# Patient Record
Sex: FEMALE | Race: WHITE
[De-identification: names, ages, dates, MRNs, and addresses within clinical notes are randomized per-mention and may not be internally consistent; named-entity substitution may affect disease eponyms.]

---

## 2021-03-10 PROBLEM — Z00.00 ENCOUNTER FOR PREVENTIVE HEALTH EXAMINATION: Status: ACTIVE | Noted: 2021-03-10

## 2021-03-17 ENCOUNTER — NON-APPOINTMENT (OUTPATIENT)
Age: 71
End: 2021-03-17

## 2021-03-21 ENCOUNTER — APPOINTMENT (OUTPATIENT)
Dept: DISASTER EMERGENCY | Facility: CLINIC | Age: 71
End: 2021-03-21

## 2021-03-21 DIAGNOSIS — Z01.818 ENCOUNTER FOR OTHER PREPROCEDURAL EXAMINATION: ICD-10-CM

## 2021-03-22 LAB — SARS-COV-2 N GENE NPH QL NAA+PROBE: NOT DETECTED

## 2021-03-24 ENCOUNTER — OUTPATIENT (OUTPATIENT)
Dept: OUTPATIENT SERVICES | Facility: HOSPITAL | Age: 71
LOS: 1 days | Discharge: HOME | End: 2021-03-24
Payer: MEDICARE

## 2021-03-24 VITALS
TEMPERATURE: 98 F | OXYGEN SATURATION: 99 % | DIASTOLIC BLOOD PRESSURE: 75 MMHG | HEART RATE: 95 BPM | RESPIRATION RATE: 16 BRPM | HEIGHT: 62 IN | WEIGHT: 130.07 LBS | SYSTOLIC BLOOD PRESSURE: 153 MMHG

## 2021-03-24 VITALS
DIASTOLIC BLOOD PRESSURE: 76 MMHG | TEMPERATURE: 98 F | HEART RATE: 78 BPM | SYSTOLIC BLOOD PRESSURE: 120 MMHG | RESPIRATION RATE: 17 BRPM | OXYGEN SATURATION: 98 %

## 2021-03-24 DIAGNOSIS — Z98.890 OTHER SPECIFIED POSTPROCEDURAL STATES: Chronic | ICD-10-CM

## 2021-03-24 DIAGNOSIS — I25.10 ATHEROSCLEROTIC HEART DISEASE OF NATIVE CORONARY ARTERY WITHOUT ANGINA PECTORIS: ICD-10-CM

## 2021-03-24 LAB
ANION GAP SERPL CALC-SCNC: 14 MMOL/L — SIGNIFICANT CHANGE UP (ref 5–17)
BUN SERPL-MCNC: 19 MG/DL — SIGNIFICANT CHANGE UP (ref 7–23)
CALCIUM SERPL-MCNC: 10 MG/DL — SIGNIFICANT CHANGE UP (ref 8.4–10.5)
CHLORIDE SERPL-SCNC: 104 MMOL/L — SIGNIFICANT CHANGE UP (ref 96–108)
CO2 SERPL-SCNC: 21 MMOL/L — LOW (ref 22–31)
CREAT SERPL-MCNC: 0.53 MG/DL — SIGNIFICANT CHANGE UP (ref 0.5–1.3)
GLUCOSE BLDC GLUCOMTR-MCNC: 213 MG/DL — HIGH (ref 70–99)
GLUCOSE BLDC GLUCOMTR-MCNC: 245 MG/DL — HIGH (ref 70–99)
GLUCOSE SERPL-MCNC: 248 MG/DL — HIGH (ref 70–99)
HCT VFR BLD CALC: 44.6 % — SIGNIFICANT CHANGE UP (ref 34.5–45)
HGB BLD-MCNC: 14.9 G/DL — SIGNIFICANT CHANGE UP (ref 11.5–15.5)
MCHC RBC-ENTMCNC: 29.7 PG — SIGNIFICANT CHANGE UP (ref 27–34)
MCHC RBC-ENTMCNC: 33.4 GM/DL — SIGNIFICANT CHANGE UP (ref 32–36)
MCV RBC AUTO: 88.8 FL — SIGNIFICANT CHANGE UP (ref 80–100)
NRBC # BLD: 0 /100 WBCS — SIGNIFICANT CHANGE UP (ref 0–0)
PLATELET # BLD AUTO: 313 K/UL — SIGNIFICANT CHANGE UP (ref 150–400)
POTASSIUM SERPL-MCNC: 4.5 MMOL/L — SIGNIFICANT CHANGE UP (ref 3.5–5.3)
POTASSIUM SERPL-SCNC: 4.5 MMOL/L — SIGNIFICANT CHANGE UP (ref 3.5–5.3)
RBC # BLD: 5.02 M/UL — SIGNIFICANT CHANGE UP (ref 3.8–5.2)
RBC # FLD: 12.2 % — SIGNIFICANT CHANGE UP (ref 10.3–14.5)
SODIUM SERPL-SCNC: 139 MMOL/L — SIGNIFICANT CHANGE UP (ref 135–145)
WBC # BLD: 8.04 K/UL — SIGNIFICANT CHANGE UP (ref 3.8–10.5)
WBC # FLD AUTO: 8.04 K/UL — SIGNIFICANT CHANGE UP (ref 3.8–10.5)

## 2021-03-24 PROCEDURE — 99153 MOD SED SAME PHYS/QHP EA: CPT

## 2021-03-24 PROCEDURE — 77263 THER RADIOLOGY TX PLNG CPLX: CPT

## 2021-03-24 PROCEDURE — 77316 BRACHYTX ISODOSE PLAN SIMPLE: CPT | Mod: 26,59

## 2021-03-24 PROCEDURE — 77770 HDR RDNCL NTRSTL/ICAV BRCHTX: CPT

## 2021-03-24 PROCEDURE — C1728: CPT

## 2021-03-24 PROCEDURE — 77470 SPECIAL RADIATION TREATMENT: CPT | Mod: 26,59

## 2021-03-24 PROCEDURE — 77316 BRACHYTX ISODOSE PLAN SIMPLE: CPT

## 2021-03-24 PROCEDURE — C1894: CPT

## 2021-03-24 PROCEDURE — 82962 GLUCOSE BLOOD TEST: CPT

## 2021-03-24 PROCEDURE — 77290 THER RAD SIMULAJ FIELD CPLX: CPT

## 2021-03-24 PROCEDURE — C1725: CPT

## 2021-03-24 PROCEDURE — 99152 MOD SED SAME PHYS/QHP 5/>YRS: CPT

## 2021-03-24 PROCEDURE — C1753: CPT

## 2021-03-24 PROCEDURE — 77770 HDR RDNCL NTRSTL/ICAV BRCHTX: CPT | Mod: 26

## 2021-03-24 PROCEDURE — 77470 SPECIAL RADIATION TREATMENT: CPT

## 2021-03-24 PROCEDURE — 93010 ELECTROCARDIOGRAM REPORT: CPT | Mod: 77

## 2021-03-24 PROCEDURE — 80048 BASIC METABOLIC PNL TOTAL CA: CPT

## 2021-03-24 PROCEDURE — 85027 COMPLETE CBC AUTOMATED: CPT

## 2021-03-24 PROCEDURE — 77370 RADIATION PHYSICS CONSULT: CPT

## 2021-03-24 PROCEDURE — 92974 CATH PLACE CARDIO BRACHYTX: CPT

## 2021-03-24 PROCEDURE — 99221 1ST HOSP IP/OBS SF/LOW 40: CPT | Mod: 25

## 2021-03-24 PROCEDURE — C1885: CPT

## 2021-03-24 PROCEDURE — 93005 ELECTROCARDIOGRAM TRACING: CPT

## 2021-03-24 PROCEDURE — 93010 ELECTROCARDIOGRAM REPORT: CPT

## 2021-03-24 PROCEDURE — 77290 THER RAD SIMULAJ FIELD CPLX: CPT | Mod: 26,59

## 2021-03-24 PROCEDURE — C1769: CPT

## 2021-03-24 PROCEDURE — 92978 ENDOLUMINL IVUS OCT C 1ST: CPT | Mod: RC

## 2021-03-24 PROCEDURE — C1717: CPT

## 2021-03-24 PROCEDURE — C1887: CPT

## 2021-03-24 PROCEDURE — 92924 PRQ TRLUML C ATHRC 1 ART&/BR: CPT | Mod: RC

## 2021-03-24 RX ORDER — INSULIN LISPRO 100/ML
VIAL (ML) SUBCUTANEOUS AT BEDTIME
Refills: 0 | Status: DISCONTINUED | OUTPATIENT
Start: 2021-03-24 | End: 2021-04-07

## 2021-03-24 RX ORDER — DEXTROSE 50 % IN WATER 50 %
25 SYRINGE (ML) INTRAVENOUS ONCE
Refills: 0 | Status: DISCONTINUED | OUTPATIENT
Start: 2021-03-24 | End: 2021-04-07

## 2021-03-24 RX ORDER — INSULIN LISPRO 100/ML
VIAL (ML) SUBCUTANEOUS
Refills: 0 | Status: DISCONTINUED | OUTPATIENT
Start: 2021-03-24 | End: 2021-04-07

## 2021-03-24 RX ORDER — SODIUM CHLORIDE 9 MG/ML
1000 INJECTION, SOLUTION INTRAVENOUS
Refills: 0 | Status: DISCONTINUED | OUTPATIENT
Start: 2021-03-24 | End: 2021-04-07

## 2021-03-24 RX ORDER — DEXTROSE 50 % IN WATER 50 %
15 SYRINGE (ML) INTRAVENOUS ONCE
Refills: 0 | Status: DISCONTINUED | OUTPATIENT
Start: 2021-03-24 | End: 2021-04-07

## 2021-03-24 RX ORDER — GLUCAGON INJECTION, SOLUTION 0.5 MG/.1ML
1 INJECTION, SOLUTION SUBCUTANEOUS ONCE
Refills: 0 | Status: DISCONTINUED | OUTPATIENT
Start: 2021-03-24 | End: 2021-04-07

## 2021-03-24 RX ORDER — DIPHENHYDRAMINE HCL 50 MG
50 CAPSULE ORAL ONCE
Refills: 0 | Status: COMPLETED | OUTPATIENT
Start: 2021-03-24 | End: 2021-03-24

## 2021-03-24 RX ORDER — DEXTROSE 50 % IN WATER 50 %
12.5 SYRINGE (ML) INTRAVENOUS ONCE
Refills: 0 | Status: DISCONTINUED | OUTPATIENT
Start: 2021-03-24 | End: 2021-04-07

## 2021-03-24 RX ADMIN — Medication 50 MILLIGRAM(S): at 09:47

## 2021-03-24 NOTE — H&P CARDIOLOGY - HISTORY OF PRESENT ILLNESS
71 yo F with PMhx CAD with stents to Left anterior descending artery, diagonal  and circumflex and s/p cardiac cath at [facility] on 2/22/2021 with PTCA to RCA. She presents today for staged brachytherapy to the RCA .  71 yo F with PMhx HLD, DM ,  CAD with stents x20 to Left anterior descending artery, diagonal  and circumflex and s/p cardiac cath at [facility] on 2/22/2021 with PTCA to RCA. In February she was experiencing CP, KNOX and fatigued so was scheduled for cardiac cath .  She presents today for staged brachytherapy to the RCA . COVID negative 3/21, no fever or chills, no N/V/D or abdominal pain.  No hx of PPM or implantable cardiac device .

## 2021-03-24 NOTE — CHART NOTE - NSCHARTNOTEFT_GEN_A_CORE
Received pt s/p successful IVUS guided PCI of RCA with laser Atherectomy, Brachytherapy via RRA band .  Remove RRA band at 1400hr today on ASA and Plavix . Right radial wrist intact with band on no bleeding or hematoma noted. Will continue to monitor closely . No acute distress noted. No complaints of any CP or SOB noted.     Mary Sheth np   [phone]   Cardiology

## 2021-03-24 NOTE — H&P CARDIOLOGY - PMH
Coronary artery disease involving native coronary artery of native heart with unstable angina pectoris  s/p stents x 20  Hyperlipidemia, unspecified hyperlipidemia type    Type 2 diabetes mellitus without complication, without long-term current use of insulin

## 2021-04-05 DIAGNOSIS — I10 ESSENTIAL (PRIMARY) HYPERTENSION: ICD-10-CM

## 2021-04-05 DIAGNOSIS — I25.110 ATHEROSCLEROTIC HEART DISEASE OF NATIVE CORONARY ARTERY WITH UNSTABLE ANGINA PECTORIS: ICD-10-CM

## 2021-04-15 ENCOUNTER — APPOINTMENT (OUTPATIENT)
Dept: CV DIAGNOSITCS | Facility: HOSPITAL | Age: 71
End: 2021-04-15

## 2022-05-25 ENCOUNTER — EMERGENCY (EMERGENCY)
Facility: HOSPITAL | Age: 72
LOS: 0 days | Discharge: HOME | End: 2022-05-25
Attending: EMERGENCY MEDICINE
Payer: COMMERCIAL

## 2022-05-25 VITALS
OXYGEN SATURATION: 100 % | HEIGHT: 62 IN | SYSTOLIC BLOOD PRESSURE: 168 MMHG | HEART RATE: 106 BPM | TEMPERATURE: 98 F | RESPIRATION RATE: 18 BRPM | WEIGHT: 119.93 LBS | DIASTOLIC BLOOD PRESSURE: 93 MMHG

## 2022-05-25 DIAGNOSIS — Z98.890 OTHER SPECIFIED POSTPROCEDURAL STATES: Chronic | ICD-10-CM

## 2022-05-25 DIAGNOSIS — Z79.01 LONG TERM (CURRENT) USE OF ANTICOAGULANTS: ICD-10-CM

## 2022-05-25 DIAGNOSIS — Z79.82 LONG TERM (CURRENT) USE OF ASPIRIN: ICD-10-CM

## 2022-05-25 DIAGNOSIS — M54.9 DORSALGIA, UNSPECIFIED: ICD-10-CM

## 2022-05-25 DIAGNOSIS — M54.2 CERVICALGIA: ICD-10-CM
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
## 2022-05-25 DIAGNOSIS — S20.219A CONTUSION OF UNSPECIFIED FRONT WALL OF THORAX, INITIAL ENCOUNTER: ICD-10-CM

## 2022-05-25 DIAGNOSIS — E11.9 TYPE 2 DIABETES MELLITUS WITHOUT COMPLICATIONS: ICD-10-CM

## 2022-05-25 DIAGNOSIS — E78.5 HYPERLIPIDEMIA, UNSPECIFIED: ICD-10-CM
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
## 2022-05-25 DIAGNOSIS — Z91.041 RADIOGRAPHIC DYE ALLERGY STATUS: ICD-10-CM

## 2022-05-25 PROBLEM — I25.110 ATHEROSCLEROTIC HEART DISEASE OF NATIVE CORONARY ARTERY WITH UNSTABLE ANGINA PECTORIS: Chronic | Status: ACTIVE | Noted: 2021-03-24

## 2022-05-25 LAB
APPEARANCE UR: CLEAR — SIGNIFICANT CHANGE UP
BILIRUB UR-MCNC: NEGATIVE — SIGNIFICANT CHANGE UP
COLOR SPEC: YELLOW — SIGNIFICANT CHANGE UP
DIFF PNL FLD: NEGATIVE — SIGNIFICANT CHANGE UP
GLUCOSE UR QL: NEGATIVE — SIGNIFICANT CHANGE UP
KETONES UR-MCNC: NEGATIVE — SIGNIFICANT CHANGE UP
LEUKOCYTE ESTERASE UR-ACNC: ABNORMAL
NITRITE UR-MCNC: NEGATIVE — SIGNIFICANT CHANGE UP
PH UR: 7 — SIGNIFICANT CHANGE UP (ref 5–8)
PROT UR-MCNC: NEGATIVE — SIGNIFICANT CHANGE UP
SP GR SPEC: 1 — LOW (ref 1.01–1.02)
UROBILINOGEN FLD QL: NEGATIVE — SIGNIFICANT CHANGE UP

## 2022-05-25 PROCEDURE — 74176 CT ABD & PELVIS W/O CONTRAST: CPT | Mod: MA

## 2022-05-25 PROCEDURE — 72125 CT NECK SPINE W/O DYE: CPT | Mod: MA

## 2022-05-25 PROCEDURE — 73060 X-RAY EXAM OF HUMERUS: CPT | Mod: LT

## 2022-05-25 PROCEDURE — 99285 EMERGENCY DEPT VISIT HI MDM: CPT

## 2022-05-25 PROCEDURE — 73030 X-RAY EXAM OF SHOULDER: CPT | Mod: 26,LT

## 2022-05-25 PROCEDURE — 72125 CT NECK SPINE W/O DYE: CPT | Mod: 26,MA

## 2022-05-25 PROCEDURE — 70450 CT HEAD/BRAIN W/O DYE: CPT | Mod: 26,MA

## 2022-05-25 PROCEDURE — 87077 CULTURE AEROBIC IDENTIFY: CPT

## 2022-05-25 PROCEDURE — 73000 X-RAY EXAM OF COLLAR BONE: CPT | Mod: LT

## 2022-05-25 PROCEDURE — 73030 X-RAY EXAM OF SHOULDER: CPT | Mod: LT

## 2022-05-25 PROCEDURE — 74176 CT ABD & PELVIS W/O CONTRAST: CPT | Mod: 26,MA

## 2022-05-25 PROCEDURE — 87186 SC STD MICRODIL/AGAR DIL: CPT

## 2022-05-25 PROCEDURE — 71046 X-RAY EXAM CHEST 2 VIEWS: CPT | Mod: 26

## 2022-05-25 PROCEDURE — 87086 URINE CULTURE/COLONY COUNT: CPT

## 2022-05-25 PROCEDURE — 73000 X-RAY EXAM OF COLLAR BONE: CPT | Mod: 26,LT

## 2022-05-25 PROCEDURE — 71046 X-RAY EXAM CHEST 2 VIEWS: CPT

## 2022-05-25 PROCEDURE — 71250 CT THORAX DX C-: CPT | Mod: MA

## 2022-05-25 PROCEDURE — 70450 CT HEAD/BRAIN W/O DYE: CPT | Mod: MA

## 2022-05-25 PROCEDURE — 71250 CT THORAX DX C-: CPT | Mod: 26,MA

## 2022-05-25 PROCEDURE — 73060 X-RAY EXAM OF HUMERUS: CPT | Mod: 26,LT

## 2022-05-25 PROCEDURE — 82962 GLUCOSE BLOOD TEST: CPT

## 2022-05-25 PROCEDURE — 81001 URINALYSIS AUTO W/SCOPE: CPT

## 2022-05-25 RX ORDER — ACETAMINOPHEN 500 MG
650 TABLET ORAL ONCE
Refills: 0 | Status: COMPLETED | OUTPATIENT
Start: 2022-05-25 | End: 2022-05-25

## 2022-05-25 RX ORDER — CEPHALEXIN 500 MG
1 CAPSULE ORAL
Qty: 28 | Refills: 0
Start: 2022-05-25 | End: 2022-05-31

## 2022-05-25 RX ORDER — ACETAMINOPHEN 500 MG
1000 TABLET ORAL ONCE
Refills: 0 | Status: DISCONTINUED | OUTPATIENT
Start: 2022-05-25 | End: 2022-05-25

## 2022-05-25 RX ADMIN — Medication 650 MILLIGRAM(S): at 16:16

## 2022-05-25 NOTE — ED PROVIDER NOTE - NS ED ROS FT
Constitutional: nad, well appearing  HEENT:  no nasal congestion, eye drainage or ear pain.    CVS:  no cp  Resp:  No sob, no cough  GI:  no abdominal pain, no nausea or vomiting  :  no dysuria  MSK: +neck pain   Skin: no rash  Neuro: no change in mental status or level of consciousness  Heme/lymph: no bleeding

## 2022-05-25 NOTE — ED PROVIDER NOTE - PHYSICAL EXAMINATION
Constitutional: NAD, well appearing  HEENT: no rhinorrhea, PERRL, no oropharyngeal erythema or exudates, midline uvula.  TMs clear. No visible evidence of trauma to scalp.     CVS:  RRR, no m/r/g  Resp:  CTAB  GI: soft, ntnd  MSK:  no restriction to rom, full ROM to all extremities. Paraspinal left cervical tenderness  Neuro:  A&Ox3, 5/5 strength to all extremities,  SILT to all extremities  Skin: no rash  psych: clear thought content  Heme/lymph:  No LAD

## 2022-05-25 NOTE — ED PROVIDER NOTE - PATIENT PORTAL LINK FT
You can access the FollowMyHealth Patient Portal offered by [facility] by registering at the following website: [email or web address]. By joining [employer]’s FollowMyHealth portal, you will also be able to view your health information using other applications (apps) compatible with our system.

## 2022-05-25 NOTE — ED PROVIDER NOTE - PROGRESS NOTE DETAILS
PT with continued thoracic back pain and has ecchymosis to chest wall thus ordered for ct chest as well.   Pt has no cp at this time, however. CT chest unremarkable aside from incidental kidney findings.  CT a/p performed and unremarkable.  Understands need for urology f/u.  NO acute traumatic injuries.  Pt well appearing.  D/c home with strict return precautions and prompt outpatient f/u.

## 2022-05-25 NOTE — ED PROVIDER NOTE - NSICDXPASTMEDICALHX_GEN_ALL_CORE_FT
PAST MEDICAL HISTORY:  Coronary artery disease involving native coronary artery of native heart with unstable angina pectoris s/p stents x 20    Hyperlipidemia, unspecified hyperlipidemia type     Type 2 diabetes mellitus without complication, without long-term current use of insulin

## 2022-05-25 NOTE — ED PROVIDER NOTE - CLINICAL SUMMARY MEDICAL DECISION MAKING FREE TEXT BOX
Given questionable LOC, age and on Plavix, will CT head/Cspine. Will XR shoulder. No chest wall or abd tenderness to indicate imaging. Dispo pending imaging.

## 2022-05-25 NOTE — ED PROVIDER NOTE - CARE PROVIDER_API CALL
Thaddeus Martinez)  Urology  [address], [address] [place] [address]  Phone: [phone]  Fax: [phone]  Follow Up Time: 1-3 Days
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
## 2024-05-01 RX ADMIN — HYDROMORPHONE HYDROCHLORIDE 4 MILLIGRAM(S): 2 INJECTION INTRAMUSCULAR; INTRAVENOUS; SUBCUTANEOUS at 22:46

## 2024-05-18 ENCOUNTER — EMERGENCY (EMERGENCY)
Facility: HOSPITAL | Age: 74
LOS: 0 days | Discharge: HOME | End: 2024-05-19
Attending: HOSPITALIST
Payer: MEDICARE

## 2024-05-18 VITALS — WEIGHT: 117.07 LBS | HEIGHT: 64 IN

## 2024-05-18 DIAGNOSIS — Z98.890 OTHER SPECIFIED POSTPROCEDURAL STATES: Chronic | ICD-10-CM

## 2024-05-18 DIAGNOSIS — G89.29 OTHER CHRONIC PAIN: ICD-10-CM

## 2024-05-18 DIAGNOSIS — K29.70 GASTRITIS, UNSPECIFIED, WITHOUT BLEEDING: ICD-10-CM

## 2024-05-18 DIAGNOSIS — R10.13 EPIGASTRIC PAIN: ICD-10-CM

## 2024-05-18 DIAGNOSIS — R10.12 LEFT UPPER QUADRANT PAIN: ICD-10-CM

## 2024-05-18 DIAGNOSIS — Z91.041 RADIOGRAPHIC DYE ALLERGY STATUS: ICD-10-CM

## 2024-05-18 LAB
ALBUMIN SERPL ELPH-MCNC: 3.7 G/DL — SIGNIFICANT CHANGE UP (ref 3.3–5)
ALP SERPL-CCNC: 111 U/L — SIGNIFICANT CHANGE UP (ref 40–120)
ALT FLD-CCNC: 22 U/L — SIGNIFICANT CHANGE UP (ref 12–78)
ANION GAP SERPL CALC-SCNC: 5 MMOL/L — SIGNIFICANT CHANGE UP (ref 5–17)
APPEARANCE UR: CLEAR — SIGNIFICANT CHANGE UP
AST SERPL-CCNC: 19 U/L — SIGNIFICANT CHANGE UP (ref 15–37)
BACTERIA # UR AUTO: NEGATIVE /HPF — SIGNIFICANT CHANGE UP
BASOPHILS # BLD AUTO: 0.03 K/UL — SIGNIFICANT CHANGE UP (ref 0–0.2)
BASOPHILS NFR BLD AUTO: 0.5 % — SIGNIFICANT CHANGE UP (ref 0–2)
BILIRUB SERPL-MCNC: 0.8 MG/DL — SIGNIFICANT CHANGE UP (ref 0.2–1.2)
BILIRUB UR-MCNC: NEGATIVE — SIGNIFICANT CHANGE UP
BUN SERPL-MCNC: 17 MG/DL — SIGNIFICANT CHANGE UP (ref 7–23)
CALCIUM SERPL-MCNC: 10.1 MG/DL — SIGNIFICANT CHANGE UP (ref 8.5–10.1)
CAST: 0 /LPF — SIGNIFICANT CHANGE UP (ref 0–4)
CHLORIDE SERPL-SCNC: 107 MMOL/L — SIGNIFICANT CHANGE UP (ref 96–108)
CO2 SERPL-SCNC: 28 MMOL/L — SIGNIFICANT CHANGE UP (ref 22–31)
COLOR SPEC: YELLOW — SIGNIFICANT CHANGE UP
CREAT SERPL-MCNC: 0.88 MG/DL — SIGNIFICANT CHANGE UP (ref 0.5–1.3)
DIFF PNL FLD: NEGATIVE — SIGNIFICANT CHANGE UP
EGFR: 69 ML/MIN/1.73M2 — SIGNIFICANT CHANGE UP
EOSINOPHIL # BLD AUTO: 0.06 K/UL — SIGNIFICANT CHANGE UP (ref 0–0.5)
EOSINOPHIL NFR BLD AUTO: 1 % — SIGNIFICANT CHANGE UP (ref 0–6)
GLUCOSE SERPL-MCNC: 188 MG/DL — HIGH (ref 70–99)
GLUCOSE UR QL: NEGATIVE MG/DL — SIGNIFICANT CHANGE UP
HCT VFR BLD CALC: 38.8 % — SIGNIFICANT CHANGE UP (ref 34.5–45)
HGB BLD-MCNC: 12.8 G/DL — SIGNIFICANT CHANGE UP (ref 11.5–15.5)
IMM GRANULOCYTES NFR BLD AUTO: 0.2 % — SIGNIFICANT CHANGE UP (ref 0–0.9)
KETONES UR-MCNC: NEGATIVE MG/DL — SIGNIFICANT CHANGE UP
LEUKOCYTE ESTERASE UR-ACNC: ABNORMAL
LIDOCAIN IGE QN: 10 U/L — LOW (ref 13–75)
LYMPHOCYTES # BLD AUTO: 1.91 K/UL — SIGNIFICANT CHANGE UP (ref 1–3.3)
LYMPHOCYTES # BLD AUTO: 30.9 % — SIGNIFICANT CHANGE UP (ref 13–44)
MCHC RBC-ENTMCNC: 29.1 PG — SIGNIFICANT CHANGE UP (ref 27–34)
MCHC RBC-ENTMCNC: 33 GM/DL — SIGNIFICANT CHANGE UP (ref 32–36)
MCV RBC AUTO: 88.2 FL — SIGNIFICANT CHANGE UP (ref 80–100)
MONOCYTES # BLD AUTO: 0.55 K/UL — SIGNIFICANT CHANGE UP (ref 0–0.9)
MONOCYTES NFR BLD AUTO: 8.9 % — SIGNIFICANT CHANGE UP (ref 2–14)
NEUTROPHILS # BLD AUTO: 3.62 K/UL — SIGNIFICANT CHANGE UP (ref 1.8–7.4)
NEUTROPHILS NFR BLD AUTO: 58.5 % — SIGNIFICANT CHANGE UP (ref 43–77)
NITRITE UR-MCNC: NEGATIVE — SIGNIFICANT CHANGE UP
PH UR: 7 — SIGNIFICANT CHANGE UP (ref 5–8)
PLATELET # BLD AUTO: 286 K/UL — SIGNIFICANT CHANGE UP (ref 150–400)
POTASSIUM SERPL-MCNC: 4.4 MMOL/L — SIGNIFICANT CHANGE UP (ref 3.5–5.3)
POTASSIUM SERPL-SCNC: 4.4 MMOL/L — SIGNIFICANT CHANGE UP (ref 3.5–5.3)
PROT SERPL-MCNC: 7.4 GM/DL — SIGNIFICANT CHANGE UP (ref 6–8.3)
PROT UR-MCNC: NEGATIVE MG/DL — SIGNIFICANT CHANGE UP
RBC # BLD: 4.4 M/UL — SIGNIFICANT CHANGE UP (ref 3.8–5.2)
RBC # FLD: 13.3 % — SIGNIFICANT CHANGE UP (ref 10.3–14.5)
RBC CASTS # UR COMP ASSIST: 0 /HPF — SIGNIFICANT CHANGE UP (ref 0–4)
SODIUM SERPL-SCNC: 140 MMOL/L — SIGNIFICANT CHANGE UP (ref 135–145)
SP GR SPEC: 1.01 — SIGNIFICANT CHANGE UP (ref 1–1.03)
SQUAMOUS # UR AUTO: 0 /HPF — SIGNIFICANT CHANGE UP (ref 0–5)
UROBILINOGEN FLD QL: 0.2 MG/DL — SIGNIFICANT CHANGE UP (ref 0.2–1)
WBC # BLD: 6.18 K/UL — SIGNIFICANT CHANGE UP (ref 3.8–10.5)
WBC # FLD AUTO: 6.18 K/UL — SIGNIFICANT CHANGE UP (ref 3.8–10.5)
WBC UR QL: 8 /HPF — HIGH (ref 0–5)

## 2024-05-18 PROCEDURE — 99285 EMERGENCY DEPT VISIT HI MDM: CPT

## 2024-05-18 RX ORDER — MORPHINE SULFATE 50 MG/1
4 CAPSULE, EXTENDED RELEASE ORAL ONCE
Refills: 0 | Status: DISCONTINUED | OUTPATIENT
Start: 2024-05-18 | End: 2024-05-18

## 2024-05-18 RX ORDER — DIPHENHYDRAMINE HCL 50 MG
50 CAPSULE ORAL ONCE
Refills: 0 | Status: COMPLETED | OUTPATIENT
Start: 2024-05-19 | End: 2024-05-19

## 2024-05-18 RX ADMIN — Medication 125 MILLIGRAM(S): at 23:00

## 2024-05-18 RX ADMIN — MORPHINE SULFATE 4 MILLIGRAM(S): 50 CAPSULE, EXTENDED RELEASE ORAL at 22:53

## 2024-05-18 NOTE — ED PROVIDER NOTE - PATIENT PORTAL LINK FT
You can access the FollowMyHealth Patient Portal offered by [facility] by registering at the following website: [email or web address]. By joining [employer]’s FollowMyHealth portal, you will also be able to view your health information using other applications (apps) compatible with our system.

## 2024-05-18 NOTE — ED ADULT TRIAGE NOTE - CHIEF COMPLAINT QUOTE
Pt ambulatory to ED c/o worsening abdominal and back pain starting x10 days ago. Pt reports taking tylenol with minimal relief. Endorses nausea, denies V/D, chest pain, SOB, fevers/chills. NKDA.

## 2024-05-18 NOTE — ED PROVIDER NOTE - PHYSICAL EXAMINATION
***GEN - NAD; well appearing; A+O x3 ***HEAD - NC/AT ***EYES/NOSE - PEERL, EOMI, mucous membranes moist, no discharge ***THROAT: Oral cavity and pharynx normal. No inflammation, swelling, exudate, or lesions.  ***NECK: Neck supple, non-tender without lymphadenopathy, no masses, no thyromegaly.   ***PULMONARY - CTA b/l, symmetric breath sounds. ***CARDIAC -s1s2, RRR, no M,G,R  ***ABDOMEN - +BS, ND, tender epigastric, LUQ, soft, no guarding, no rebound, no masses   ***BACK - no CVA tenderness, Normal  spine ***EXTREMITIES - symmetric pulses, 2+ dp, capillary refill < 2 seconds, no clubbing, no cyanosis, no edema ***SKIN - no rash or bruising   ***NEUROLOGIC - alert, CN 2-12 intact, reflexes nl, sensation nl, coordination nl, finger to nose nl, romberg negative, motor 5/5 RUE/LUE/RLE/LLE/EHL/Plantar flexion, no pronator drift, gait nl, ***PSYCH - insight and judgment nl, memory nl, affect nl, thought nl

## 2024-05-18 NOTE — ED PROVIDER NOTE - NSFOLLOWUPINSTRUCTIONS_ED_ALL_ED_FT
please continue to take pantoprazole.  Please take Carafate as prescribed.  Please try to make some dietary modifications as we discussed.  Please follow-up with gastroenterology–referral provided

## 2024-05-18 NOTE — ED PROVIDER NOTE - OBJECTIVE STATEMENT
72 y/o female with PMHx of pancreatic ca s/p chemo presents to the ED c/o worsening abdominal pain. States pain radiates to her back, ongoing 6-8 weeks, worsening past 2 days. Normal BM. Denies V/D, dysuria, fever. Taking tylenol.

## 2024-05-18 NOTE — ED PROVIDER NOTE - CLINICAL SUMMARY MEDICAL DECISION MAKING FREE TEXT BOX
74 y/o female acute on chronic abdominal pain. Labs, CT A/P, has IV contrast allergy, will pre medicate prior to scan. 74 y/o female acute on chronic abdominal pain. Labs, CT A/P, has IV contrast allergy, will pre medicate prior to scan. Patient feeling somewhat better after medications.  Results of labs and imaging reviewed with patient and family member at bedside.  Patient with likely gastritis as she does have a history of this in the past.  Will prescribe Carafate.  Patient already taking pantoprazole.  Outpatient follow-up with gastroenterology.  Referral provided

## 2024-05-18 NOTE — ED PROVIDER NOTE - NS_ ATTENDINGSCRIBEDETAILS _ED_A_ED_FT
Karishma Luther MD: The history, relevant review of systems, past medical and surgical history, medical decision making, and physical examination was documented by the scribe in my presence and I attest to the accuracy of the documentation.

## 2024-05-18 NOTE — ED PROVIDER NOTE - CARE PROVIDER_API CALL
Fernandez Raya  Gastroenterology  [address] [place] [address]  Phone: [phone]  Fax: [phone]  Follow Up Time: 4-6 Days

## 2024-05-19 VITALS
TEMPERATURE: 98 F | RESPIRATION RATE: 17 BRPM | OXYGEN SATURATION: 98 % | SYSTOLIC BLOOD PRESSURE: 138 MMHG | DIASTOLIC BLOOD PRESSURE: 62 MMHG | HEART RATE: 64 BPM

## 2024-05-19 PROCEDURE — 74177 CT ABD & PELVIS W/CONTRAST: CPT | Mod: 26,MC

## 2024-05-19 RX ORDER — SUCRALFATE 1 G
1 TABLET ORAL ONCE
Refills: 0 | Status: COMPLETED | OUTPATIENT
Start: 2024-05-19 | End: 2024-05-19

## 2024-05-19 RX ORDER — SUCRALFATE 1 G
10 TABLET ORAL
Qty: 140 | Refills: 0
Start: 2024-05-19 | End: 2024-06-01

## 2024-05-19 RX ADMIN — Medication 50 MILLIGRAM(S): at 02:24

## 2024-05-19 RX ADMIN — Medication 1 GRAM(S): at 04:40

## 2024-05-19 NOTE — ED ADULT NURSE REASSESSMENT NOTE - NS ED NURSE REASSESS COMMENT FT1
Received report from previous RN at 0200. Pt does not offer any complaints at this time. Vital signs stable as per EMR. Safety and comfort maintained. Son at the bedside. Respirations appear even and unlabored, in NAD.

## 2024-05-19 NOTE — ED ADULT NURSE NOTE - OBJECTIVE STATEMENT
Pt ambulatory to ED c/o worsening abdominal and back pain starting x10 days ago. Pt reports taking tylenol with minimal relief. Endorses nausea, denies V/D, chest pain, SOB, fevers/chills. NKDA. Pt is awake and alert, a&ox4, ambulatory. PMHx of pancreatic ca.

## 2024-05-20 LAB
CULTURE RESULTS: SIGNIFICANT CHANGE UP
SPECIMEN SOURCE: SIGNIFICANT CHANGE UP

## 2024-05-21 ENCOUNTER — INPATIENT (INPATIENT)
Facility: HOSPITAL | Age: 74
LOS: 1 days | Discharge: HOME | DRG: 438 | End: 2024-05-23
Attending: FAMILY MEDICINE | Admitting: INTERNAL MEDICINE
Payer: MEDICARE

## 2024-05-21 VITALS — HEIGHT: 64 IN
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
## 2024-05-21 DIAGNOSIS — Z98.890 OTHER SPECIFIED POSTPROCEDURAL STATES: Chronic | ICD-10-CM
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
## 2024-05-21 PROCEDURE — 99285 EMERGENCY DEPT VISIT HI MDM: CPT

## 2024-05-21 RX ORDER — MORPHINE SULFATE 50 MG/1
2 CAPSULE, EXTENDED RELEASE ORAL ONCE
Refills: 0 | Status: DISCONTINUED | OUTPATIENT
Start: 2024-05-21 | End: 2024-05-21

## 2024-05-21 RX ORDER — ONDANSETRON 8 MG/1
4 TABLET, FILM COATED ORAL ONCE
Refills: 0 | Status: COMPLETED | OUTPATIENT
Start: 2024-05-21 | End: 2024-05-21

## 2024-05-21 RX ORDER — SODIUM CHLORIDE 9 MG/ML
1000 INJECTION INTRAMUSCULAR; INTRAVENOUS; SUBCUTANEOUS ONCE
Refills: 0 | Status: COMPLETED | OUTPATIENT
Start: 2024-05-21 | End: 2024-05-21

## 2024-05-21 RX ORDER — HYDROMORPHONE HYDROCHLORIDE 2 MG/ML
0.5 INJECTION INTRAMUSCULAR; INTRAVENOUS; SUBCUTANEOUS ONCE
Refills: 0 | Status: DISCONTINUED | OUTPATIENT
Start: 2024-05-21 | End: 2024-05-21

## 2024-05-21 RX ORDER — ACETAMINOPHEN 500 MG
1000 TABLET ORAL ONCE
Refills: 0 | Status: COMPLETED | OUTPATIENT
Start: 2024-05-21 | End: 2024-05-21

## 2024-05-21 NOTE — ED ADULT NURSE NOTE - OBJECTIVE STATEMENT
Pt presented to the ED with complaint of nausea, decreased PO intake and back pain. Pt has pancreatic cancer. Pt was seen on Saturday and was worked up for the same issue. Pt was recently diagnosed with gastritis. Pt reports no bowel movement for two days.

## 2024-05-21 NOTE — ED ADULT NURSE NOTE - NSFALLHARMRISKINTERV_ED_ALL_ED

## 2024-05-21 NOTE — ED ADULT TRIAGE NOTE - CHIEF COMPLAINT QUOTE
Pt ambulatory to ED with c/o low back and abdominal pain. As per daughter Pt was seen at ED sat with same symptoms that have not resolved. Pt was seen by oncologist that prescribed Tramadol for pain, pt reports feeling very nausea and decrease appetite. PMH pancreatic cancer and diabetes. Pt took extra strength Tylenol at 6pm  PTA . allergies to contrast.

## 2024-05-22 DIAGNOSIS — K85.90 ACUTE PANCREATITIS WITHOUT NECROSIS OR INFECTION, UNSPECIFIED: ICD-10-CM

## 2024-05-22 DIAGNOSIS — I25.110 ATHEROSCLEROTIC HEART DISEASE OF NATIVE CORONARY ARTERY WITH UNSTABLE ANGINA PECTORIS: ICD-10-CM

## 2024-05-22 DIAGNOSIS — M54.9 DORSALGIA, UNSPECIFIED: ICD-10-CM

## 2024-05-22 DIAGNOSIS — Z29.9 ENCOUNTER FOR PROPHYLACTIC MEASURES, UNSPECIFIED: ICD-10-CM

## 2024-05-22 DIAGNOSIS — E78.5 HYPERLIPIDEMIA, UNSPECIFIED: ICD-10-CM

## 2024-05-22 DIAGNOSIS — K21.9 GASTRO-ESOPHAGEAL REFLUX DISEASE WITHOUT ESOPHAGITIS: ICD-10-CM

## 2024-05-22 DIAGNOSIS — E11.9 TYPE 2 DIABETES MELLITUS WITHOUT COMPLICATIONS: ICD-10-CM

## 2024-05-22 LAB
A1C WITH ESTIMATED AVERAGE GLUCOSE RESULT: 7.8 % — HIGH (ref 4–5.6)
ALBUMIN SERPL ELPH-MCNC: 3.1 G/DL — LOW (ref 3.3–5)
ALBUMIN SERPL ELPH-MCNC: 3.5 G/DL — SIGNIFICANT CHANGE UP (ref 3.3–5)
ALP SERPL-CCNC: 107 U/L — SIGNIFICANT CHANGE UP (ref 40–120)
ALP SERPL-CCNC: 93 U/L — SIGNIFICANT CHANGE UP (ref 40–120)
ALT FLD-CCNC: 19 U/L — SIGNIFICANT CHANGE UP (ref 12–78)
ALT FLD-CCNC: 22 U/L — SIGNIFICANT CHANGE UP (ref 12–78)
ANION GAP SERPL CALC-SCNC: 3 MMOL/L — LOW (ref 5–17)
ANION GAP SERPL CALC-SCNC: 4 MMOL/L — LOW (ref 5–17)
APPEARANCE UR: CLEAR — SIGNIFICANT CHANGE UP
AST SERPL-CCNC: 25 U/L — SIGNIFICANT CHANGE UP (ref 15–37)
AST SERPL-CCNC: 9 U/L — LOW (ref 15–37)
BACTERIA # UR AUTO: NEGATIVE /HPF — SIGNIFICANT CHANGE UP
BASOPHILS # BLD AUTO: 0.01 K/UL — SIGNIFICANT CHANGE UP (ref 0–0.2)
BASOPHILS # BLD AUTO: 0.02 K/UL — SIGNIFICANT CHANGE UP (ref 0–0.2)
BASOPHILS NFR BLD AUTO: 0.2 % — SIGNIFICANT CHANGE UP (ref 0–2)
BASOPHILS NFR BLD AUTO: 0.4 % — SIGNIFICANT CHANGE UP (ref 0–2)
BILIRUB DIRECT SERPL-MCNC: 0.3 MG/DL — SIGNIFICANT CHANGE UP (ref 0–0.3)
BILIRUB INDIRECT FLD-MCNC: 0.6 MG/DL — SIGNIFICANT CHANGE UP (ref 0.2–1)
BILIRUB SERPL-MCNC: 0.9 MG/DL — SIGNIFICANT CHANGE UP (ref 0.2–1.2)
BILIRUB SERPL-MCNC: 1 MG/DL — SIGNIFICANT CHANGE UP (ref 0.2–1.2)
BILIRUB UR-MCNC: NEGATIVE — SIGNIFICANT CHANGE UP
BUN SERPL-MCNC: 14 MG/DL — SIGNIFICANT CHANGE UP (ref 7–23)
BUN SERPL-MCNC: 20 MG/DL — SIGNIFICANT CHANGE UP (ref 7–23)
CALCIUM SERPL-MCNC: 8.9 MG/DL — SIGNIFICANT CHANGE UP (ref 8.5–10.1)
CALCIUM SERPL-MCNC: 9.7 MG/DL — SIGNIFICANT CHANGE UP (ref 8.5–10.1)
CAST: 0 /LPF — SIGNIFICANT CHANGE UP (ref 0–4)
CHLORIDE SERPL-SCNC: 107 MMOL/L — SIGNIFICANT CHANGE UP (ref 96–108)
CHLORIDE SERPL-SCNC: 111 MMOL/L — HIGH (ref 96–108)
CHOLEST SERPL-MCNC: 98 MG/DL — SIGNIFICANT CHANGE UP
CO2 SERPL-SCNC: 26 MMOL/L — SIGNIFICANT CHANGE UP (ref 22–31)
CO2 SERPL-SCNC: 27 MMOL/L — SIGNIFICANT CHANGE UP (ref 22–31)
COLOR SPEC: YELLOW — SIGNIFICANT CHANGE UP
CREAT SERPL-MCNC: 0.68 MG/DL — SIGNIFICANT CHANGE UP (ref 0.5–1.3)
CREAT SERPL-MCNC: 0.79 MG/DL — SIGNIFICANT CHANGE UP (ref 0.5–1.3)
DIFF PNL FLD: NEGATIVE — SIGNIFICANT CHANGE UP
EGFR: 79 ML/MIN/1.73M2 — SIGNIFICANT CHANGE UP
EGFR: 92 ML/MIN/1.73M2 — SIGNIFICANT CHANGE UP
EOSINOPHIL # BLD AUTO: 0.01 K/UL — SIGNIFICANT CHANGE UP (ref 0–0.5)
EOSINOPHIL # BLD AUTO: 0.03 K/UL — SIGNIFICANT CHANGE UP (ref 0–0.5)
EOSINOPHIL NFR BLD AUTO: 0.2 % — SIGNIFICANT CHANGE UP (ref 0–6)
EOSINOPHIL NFR BLD AUTO: 0.6 % — SIGNIFICANT CHANGE UP (ref 0–6)
ESTIMATED AVERAGE GLUCOSE: 177 MG/DL — HIGH (ref 68–114)
GLUCOSE BLDC GLUCOMTR-MCNC: 113 MG/DL — HIGH (ref 70–99)
GLUCOSE BLDC GLUCOMTR-MCNC: 147 MG/DL — HIGH (ref 70–99)
GLUCOSE BLDC GLUCOMTR-MCNC: 215 MG/DL — HIGH (ref 70–99)
GLUCOSE BLDC GLUCOMTR-MCNC: 216 MG/DL — HIGH (ref 70–99)
GLUCOSE SERPL-MCNC: 121 MG/DL — HIGH (ref 70–99)
GLUCOSE SERPL-MCNC: 153 MG/DL — HIGH (ref 70–99)
GLUCOSE UR QL: NEGATIVE MG/DL — SIGNIFICANT CHANGE UP
HCT VFR BLD CALC: 34.4 % — LOW (ref 34.5–45)
HCT VFR BLD CALC: 38.4 % — SIGNIFICANT CHANGE UP (ref 34.5–45)
HDLC SERPL-MCNC: 63 MG/DL — SIGNIFICANT CHANGE UP
HGB BLD-MCNC: 11.4 G/DL — LOW (ref 11.5–15.5)
HGB BLD-MCNC: 12.7 G/DL — SIGNIFICANT CHANGE UP (ref 11.5–15.5)
IMM GRANULOCYTES NFR BLD AUTO: 0.2 % — SIGNIFICANT CHANGE UP (ref 0–0.9)
IMM GRANULOCYTES NFR BLD AUTO: 0.2 % — SIGNIFICANT CHANGE UP (ref 0–0.9)
KETONES UR-MCNC: NEGATIVE MG/DL — SIGNIFICANT CHANGE UP
LACTATE SERPL-SCNC: 1 MMOL/L — SIGNIFICANT CHANGE UP (ref 0.7–2)
LEUKOCYTE ESTERASE UR-ACNC: ABNORMAL
LIDOCAIN IGE QN: 9 U/L — LOW (ref 13–75)
LIPID PNL WITH DIRECT LDL SERPL: 17 MG/DL — SIGNIFICANT CHANGE UP
LYMPHOCYTES # BLD AUTO: 0.8 K/UL — LOW (ref 1–3.3)
LYMPHOCYTES # BLD AUTO: 1.41 K/UL — SIGNIFICANT CHANGE UP (ref 1–3.3)
LYMPHOCYTES # BLD AUTO: 19.2 % — SIGNIFICANT CHANGE UP (ref 13–44)
LYMPHOCYTES # BLD AUTO: 28.8 % — SIGNIFICANT CHANGE UP (ref 13–44)
MAGNESIUM SERPL-MCNC: 2.4 MG/DL — SIGNIFICANT CHANGE UP (ref 1.6–2.6)
MCHC RBC-ENTMCNC: 28.9 PG — SIGNIFICANT CHANGE UP (ref 27–34)
MCHC RBC-ENTMCNC: 29.2 PG — SIGNIFICANT CHANGE UP (ref 27–34)
MCHC RBC-ENTMCNC: 33.1 GM/DL — SIGNIFICANT CHANGE UP (ref 32–36)
MCHC RBC-ENTMCNC: 33.1 GM/DL — SIGNIFICANT CHANGE UP (ref 32–36)
MCV RBC AUTO: 87.3 FL — SIGNIFICANT CHANGE UP (ref 80–100)
MCV RBC AUTO: 88 FL — SIGNIFICANT CHANGE UP (ref 80–100)
MONOCYTES # BLD AUTO: 0.3 K/UL — SIGNIFICANT CHANGE UP (ref 0–0.9)
MONOCYTES # BLD AUTO: 0.39 K/UL — SIGNIFICANT CHANGE UP (ref 0–0.9)
MONOCYTES NFR BLD AUTO: 7.2 % — SIGNIFICANT CHANGE UP (ref 2–14)
MONOCYTES NFR BLD AUTO: 8 % — SIGNIFICANT CHANGE UP (ref 2–14)
NEUTROPHILS # BLD AUTO: 3.04 K/UL — SIGNIFICANT CHANGE UP (ref 1.8–7.4)
NEUTROPHILS # BLD AUTO: 3.04 K/UL — SIGNIFICANT CHANGE UP (ref 1.8–7.4)
NEUTROPHILS NFR BLD AUTO: 62 % — SIGNIFICANT CHANGE UP (ref 43–77)
NEUTROPHILS NFR BLD AUTO: 73 % — SIGNIFICANT CHANGE UP (ref 43–77)
NITRITE UR-MCNC: NEGATIVE — SIGNIFICANT CHANGE UP
NON HDL CHOLESTEROL: 35 MG/DL — SIGNIFICANT CHANGE UP
PH UR: 7 — SIGNIFICANT CHANGE UP (ref 5–8)
PHOSPHATE SERPL-MCNC: 3.4 MG/DL — SIGNIFICANT CHANGE UP (ref 2.5–4.5)
PLATELET # BLD AUTO: 252 K/UL — SIGNIFICANT CHANGE UP (ref 150–400)
PLATELET # BLD AUTO: 256 K/UL — SIGNIFICANT CHANGE UP (ref 150–400)
POTASSIUM SERPL-MCNC: 4.1 MMOL/L — SIGNIFICANT CHANGE UP (ref 3.5–5.3)
POTASSIUM SERPL-MCNC: 4.4 MMOL/L — SIGNIFICANT CHANGE UP (ref 3.5–5.3)
POTASSIUM SERPL-SCNC: 4.1 MMOL/L — SIGNIFICANT CHANGE UP (ref 3.5–5.3)
POTASSIUM SERPL-SCNC: 4.4 MMOL/L — SIGNIFICANT CHANGE UP (ref 3.5–5.3)
PROT SERPL-MCNC: 6.1 GM/DL — SIGNIFICANT CHANGE UP (ref 6–8.3)
PROT SERPL-MCNC: 7.3 GM/DL — SIGNIFICANT CHANGE UP (ref 6–8.3)
PROT UR-MCNC: NEGATIVE MG/DL — SIGNIFICANT CHANGE UP
RBC # BLD: 3.91 M/UL — SIGNIFICANT CHANGE UP (ref 3.8–5.2)
RBC # BLD: 4.4 M/UL — SIGNIFICANT CHANGE UP (ref 3.8–5.2)
RBC # FLD: 13.2 % — SIGNIFICANT CHANGE UP (ref 10.3–14.5)
RBC # FLD: 13.2 % — SIGNIFICANT CHANGE UP (ref 10.3–14.5)
RBC CASTS # UR COMP ASSIST: 1 /HPF — SIGNIFICANT CHANGE UP (ref 0–4)
SODIUM SERPL-SCNC: 137 MMOL/L — SIGNIFICANT CHANGE UP (ref 135–145)
SODIUM SERPL-SCNC: 141 MMOL/L — SIGNIFICANT CHANGE UP (ref 135–145)
SP GR SPEC: 1.01 — SIGNIFICANT CHANGE UP (ref 1–1.03)
SQUAMOUS # UR AUTO: 0 /HPF — SIGNIFICANT CHANGE UP (ref 0–5)
TRIGL SERPL-MCNC: 89 MG/DL — SIGNIFICANT CHANGE UP
TROPONIN I, HIGH SENSITIVITY RESULT: 13.11 NG/L — SIGNIFICANT CHANGE UP
UROBILINOGEN FLD QL: 0.2 MG/DL — SIGNIFICANT CHANGE UP (ref 0.2–1)
WBC # BLD: 4.17 K/UL — SIGNIFICANT CHANGE UP (ref 3.8–10.5)
WBC # BLD: 4.9 K/UL — SIGNIFICANT CHANGE UP (ref 3.8–10.5)
WBC # FLD AUTO: 4.17 K/UL — SIGNIFICANT CHANGE UP (ref 3.8–10.5)
WBC # FLD AUTO: 4.9 K/UL — SIGNIFICANT CHANGE UP (ref 3.8–10.5)
WBC UR QL: 1 /HPF — SIGNIFICANT CHANGE UP (ref 0–5)

## 2024-05-22 PROCEDURE — 74176 CT ABD & PELVIS W/O CONTRAST: CPT | Mod: 26,MC

## 2024-05-22 PROCEDURE — 83036 HEMOGLOBIN GLYCOSYLATED A1C: CPT

## 2024-05-22 PROCEDURE — 83735 ASSAY OF MAGNESIUM: CPT

## 2024-05-22 PROCEDURE — 85025 COMPLETE CBC W/AUTO DIFF WBC: CPT

## 2024-05-22 PROCEDURE — 82962 GLUCOSE BLOOD TEST: CPT

## 2024-05-22 PROCEDURE — 87086 URINE CULTURE/COLONY COUNT: CPT

## 2024-05-22 PROCEDURE — 80061 LIPID PANEL: CPT

## 2024-05-22 PROCEDURE — 97116 GAIT TRAINING THERAPY: CPT | Mod: GP

## 2024-05-22 PROCEDURE — 82248 BILIRUBIN DIRECT: CPT

## 2024-05-22 PROCEDURE — 86301 IMMUNOASSAY TUMOR CA 19-9: CPT

## 2024-05-22 PROCEDURE — 97162 PT EVAL MOD COMPLEX 30 MIN: CPT | Mod: GP

## 2024-05-22 PROCEDURE — 36415 COLL VENOUS BLD VENIPUNCTURE: CPT

## 2024-05-22 PROCEDURE — 84100 ASSAY OF PHOSPHORUS: CPT

## 2024-05-22 PROCEDURE — 99223 1ST HOSP IP/OBS HIGH 75: CPT

## 2024-05-22 PROCEDURE — C9113: CPT

## 2024-05-22 PROCEDURE — 81001 URINALYSIS AUTO W/SCOPE: CPT

## 2024-05-22 PROCEDURE — 80053 COMPREHEN METABOLIC PANEL: CPT

## 2024-05-22 PROCEDURE — 93010 ELECTROCARDIOGRAM REPORT: CPT

## 2024-05-22 RX ORDER — OXYCODONE HYDROCHLORIDE 5 MG/1
5 TABLET ORAL EVERY 6 HOURS
Refills: 0 | Status: DISCONTINUED | OUTPATIENT
Start: 2024-05-22 | End: 2024-05-23

## 2024-05-22 RX ORDER — ATORVASTATIN CALCIUM 80 MG/1
20 TABLET, FILM COATED ORAL AT BEDTIME
Refills: 0 | Status: DISCONTINUED | OUTPATIENT
Start: 2024-05-22 | End: 2024-05-23

## 2024-05-22 RX ORDER — LANOLIN ALCOHOL/MO/W.PET/CERES
3 CREAM (GRAM) TOPICAL AT BEDTIME
Refills: 0 | Status: DISCONTINUED | OUTPATIENT
Start: 2024-05-22 | End: 2024-05-23

## 2024-05-22 RX ORDER — PANTOPRAZOLE SODIUM 20 MG/1
40 TABLET, DELAYED RELEASE ORAL
Refills: 0 | Status: DISCONTINUED | OUTPATIENT
Start: 2024-05-22 | End: 2024-05-23

## 2024-05-22 RX ORDER — INSULIN LISPRO 100/ML
VIAL (ML) SUBCUTANEOUS AT BEDTIME
Refills: 0 | Status: DISCONTINUED | OUTPATIENT
Start: 2024-05-22 | End: 2024-05-23

## 2024-05-22 RX ORDER — MORPHINE SULFATE 50 MG/1
2 CAPSULE, EXTENDED RELEASE ORAL EVERY 4 HOURS
Refills: 0 | Status: DISCONTINUED | OUTPATIENT
Start: 2024-05-22 | End: 2024-05-23

## 2024-05-22 RX ORDER — DEXTROSE 50 % IN WATER 50 %
25 SYRINGE (ML) INTRAVENOUS ONCE
Refills: 0 | Status: DISCONTINUED | OUTPATIENT
Start: 2024-05-22 | End: 2024-05-23

## 2024-05-22 RX ORDER — SODIUM CHLORIDE 9 MG/ML
1000 INJECTION, SOLUTION INTRAVENOUS
Refills: 0 | Status: DISCONTINUED | OUTPATIENT
Start: 2024-05-22 | End: 2024-05-22

## 2024-05-22 RX ORDER — POLYETHYLENE GLYCOL 3350 17 G/17G
17 POWDER, FOR SOLUTION ORAL DAILY
Refills: 0 | Status: DISCONTINUED | OUTPATIENT
Start: 2024-05-22 | End: 2024-05-23

## 2024-05-22 RX ORDER — SENNA PLUS 8.6 MG/1
2 TABLET ORAL AT BEDTIME
Refills: 0 | Status: DISCONTINUED | OUTPATIENT
Start: 2024-05-22 | End: 2024-05-23

## 2024-05-22 RX ORDER — ONDANSETRON 8 MG/1
4 TABLET, FILM COATED ORAL EVERY 8 HOURS
Refills: 0 | Status: DISCONTINUED | OUTPATIENT
Start: 2024-05-22 | End: 2024-05-23

## 2024-05-22 RX ORDER — INSULIN GLARGINE 100 [IU]/ML
10 INJECTION, SOLUTION SUBCUTANEOUS AT BEDTIME
Refills: 0 | Status: DISCONTINUED | OUTPATIENT
Start: 2024-05-22 | End: 2024-05-23

## 2024-05-22 RX ORDER — ACETAMINOPHEN 500 MG
650 TABLET ORAL EVERY 6 HOURS
Refills: 0 | Status: DISCONTINUED | OUTPATIENT
Start: 2024-05-22 | End: 2024-05-23

## 2024-05-22 RX ORDER — THIAMINE MONONITRATE (VIT B1) 100 MG
100 TABLET ORAL DAILY
Refills: 0 | Status: DISCONTINUED | OUTPATIENT
Start: 2024-05-22 | End: 2024-05-23

## 2024-05-22 RX ORDER — SODIUM CHLORIDE 9 MG/ML
1000 INJECTION, SOLUTION INTRAVENOUS
Refills: 0 | Status: DISCONTINUED | OUTPATIENT
Start: 2024-05-22 | End: 2024-05-23

## 2024-05-22 RX ORDER — ASPIRIN/CALCIUM CARB/MAGNESIUM 324 MG
81 TABLET ORAL DAILY
Refills: 0 | Status: DISCONTINUED | OUTPATIENT
Start: 2024-05-22 | End: 2024-05-23

## 2024-05-22 RX ORDER — GLUCAGON INJECTION, SOLUTION 0.5 MG/.1ML
1 INJECTION, SOLUTION SUBCUTANEOUS ONCE
Refills: 0 | Status: DISCONTINUED | OUTPATIENT
Start: 2024-05-22 | End: 2024-05-23

## 2024-05-22 RX ORDER — HEPARIN SODIUM 5000 [USP'U]/ML
5000 INJECTION INTRAVENOUS; SUBCUTANEOUS EVERY 12 HOURS
Refills: 0 | Status: DISCONTINUED | OUTPATIENT
Start: 2024-05-22 | End: 2024-05-23

## 2024-05-22 RX ORDER — CYCLOBENZAPRINE HYDROCHLORIDE 10 MG/1
5 TABLET, FILM COATED ORAL THREE TIMES A DAY
Refills: 0 | Status: DISCONTINUED | OUTPATIENT
Start: 2024-05-22 | End: 2024-05-23

## 2024-05-22 RX ORDER — DEXTROSE 10 % IN WATER 10 %
125 INTRAVENOUS SOLUTION INTRAVENOUS ONCE
Refills: 0 | Status: DISCONTINUED | OUTPATIENT
Start: 2024-05-22 | End: 2024-05-23

## 2024-05-22 RX ORDER — INSULIN LISPRO 100/ML
VIAL (ML) SUBCUTANEOUS
Refills: 0 | Status: DISCONTINUED | OUTPATIENT
Start: 2024-05-22 | End: 2024-05-23

## 2024-05-22 RX ORDER — FOLIC ACID 0.8 MG
1 TABLET ORAL DAILY
Refills: 0 | Status: DISCONTINUED | OUTPATIENT
Start: 2024-05-22 | End: 2024-05-23

## 2024-05-22 RX ORDER — DEXTROSE 50 % IN WATER 50 %
12.5 SYRINGE (ML) INTRAVENOUS ONCE
Refills: 0 | Status: DISCONTINUED | OUTPATIENT
Start: 2024-05-22 | End: 2024-05-23

## 2024-05-22 RX ORDER — PANTOPRAZOLE SODIUM 20 MG/1
40 TABLET, DELAYED RELEASE ORAL ONCE
Refills: 0 | Status: COMPLETED | OUTPATIENT
Start: 2024-05-22 | End: 2024-05-22

## 2024-05-22 RX ORDER — CLOPIDOGREL BISULFATE 75 MG/1
75 TABLET, FILM COATED ORAL DAILY
Refills: 0 | Status: DISCONTINUED | OUTPATIENT
Start: 2024-05-22 | End: 2024-05-23

## 2024-05-22 RX ORDER — HYDROMORPHONE HYDROCHLORIDE 2 MG/ML
0.5 INJECTION INTRAMUSCULAR; INTRAVENOUS; SUBCUTANEOUS ONCE
Refills: 0 | Status: DISCONTINUED | OUTPATIENT
Start: 2024-05-22 | End: 2024-05-22

## 2024-05-22 RX ORDER — DEXTROSE 50 % IN WATER 50 %
15 SYRINGE (ML) INTRAVENOUS ONCE
Refills: 0 | Status: DISCONTINUED | OUTPATIENT
Start: 2024-05-22 | End: 2024-05-23

## 2024-05-22 RX ADMIN — CLOPIDOGREL BISULFATE 75 MILLIGRAM(S): 75 TABLET, FILM COATED ORAL at 08:53

## 2024-05-22 RX ADMIN — Medication 100 MILLIGRAM(S): at 02:34

## 2024-05-22 RX ADMIN — Medication 650 MILLIGRAM(S): at 11:23

## 2024-05-22 RX ADMIN — Medication 100 MILLIGRAM(S): at 21:26

## 2024-05-22 RX ADMIN — SODIUM CHLORIDE 1000 MILLILITER(S): 9 INJECTION INTRAMUSCULAR; INTRAVENOUS; SUBCUTANEOUS at 00:02

## 2024-05-22 RX ADMIN — HYDROMORPHONE HYDROCHLORIDE 0.5 MILLIGRAM(S): 2 INJECTION INTRAMUSCULAR; INTRAVENOUS; SUBCUTANEOUS at 00:23

## 2024-05-22 RX ADMIN — Medication 81 MILLIGRAM(S): at 08:52

## 2024-05-22 RX ADMIN — ONDANSETRON 4 MILLIGRAM(S): 8 TABLET, FILM COATED ORAL at 02:37

## 2024-05-22 RX ADMIN — OXYCODONE HYDROCHLORIDE 5 MILLIGRAM(S): 5 TABLET ORAL at 21:54

## 2024-05-22 RX ADMIN — POLYETHYLENE GLYCOL 3350 17 GRAM(S): 17 POWDER, FOR SOLUTION ORAL at 11:23

## 2024-05-22 RX ADMIN — OXYCODONE HYDROCHLORIDE 5 MILLIGRAM(S): 5 TABLET ORAL at 15:47

## 2024-05-22 RX ADMIN — ATORVASTATIN CALCIUM 20 MILLIGRAM(S): 80 TABLET, FILM COATED ORAL at 21:26

## 2024-05-22 RX ADMIN — Medication 100 MILLIGRAM(S): at 15:47

## 2024-05-22 RX ADMIN — SODIUM CHLORIDE 100 MILLILITER(S): 9 INJECTION, SOLUTION INTRAVENOUS at 03:54

## 2024-05-22 RX ADMIN — Medication 400 MILLIGRAM(S): at 00:23

## 2024-05-22 RX ADMIN — MORPHINE SULFATE 2 MILLIGRAM(S): 50 CAPSULE, EXTENDED RELEASE ORAL at 10:35

## 2024-05-22 RX ADMIN — PANTOPRAZOLE SODIUM 40 MILLIGRAM(S): 20 TABLET, DELAYED RELEASE ORAL at 15:52

## 2024-05-22 RX ADMIN — Medication 30 MILLILITER(S): at 11:23

## 2024-05-22 RX ADMIN — INSULIN GLARGINE 10 UNIT(S): 100 INJECTION, SOLUTION SUBCUTANEOUS at 21:28

## 2024-05-22 RX ADMIN — Medication 100 MILLIGRAM(S): at 08:53

## 2024-05-22 RX ADMIN — SENNA PLUS 2 TABLET(S): 8.6 TABLET ORAL at 21:27

## 2024-05-22 RX ADMIN — SODIUM CHLORIDE 100 MILLILITER(S): 9 INJECTION, SOLUTION INTRAVENOUS at 02:34

## 2024-05-22 RX ADMIN — Medication 1 TABLET(S): at 08:54

## 2024-05-22 RX ADMIN — OXYCODONE HYDROCHLORIDE 5 MILLIGRAM(S): 5 TABLET ORAL at 22:24

## 2024-05-22 RX ADMIN — PANTOPRAZOLE SODIUM 40 MILLIGRAM(S): 20 TABLET, DELAYED RELEASE ORAL at 02:34

## 2024-05-22 RX ADMIN — Medication 2: at 15:52

## 2024-05-22 RX ADMIN — CYCLOBENZAPRINE HYDROCHLORIDE 5 MILLIGRAM(S): 10 TABLET, FILM COATED ORAL at 02:34

## 2024-05-22 RX ADMIN — ONDANSETRON 4 MILLIGRAM(S): 8 TABLET, FILM COATED ORAL at 00:23

## 2024-05-22 RX ADMIN — PANTOPRAZOLE SODIUM 40 MILLIGRAM(S): 20 TABLET, DELAYED RELEASE ORAL at 06:10

## 2024-05-22 RX ADMIN — Medication 1 MILLIGRAM(S): at 08:54

## 2024-05-22 NOTE — ED PROVIDER NOTE - DIFFERENTIAL DIAGNOSIS
Differential Diagnosis Pancreatic and neuropathic pain, also CT with edema or peripancreatic edema and pancreatitis likely from radiation exposure for treatment, unable to tolerate PO, intractable pain, will admit. Spoke with Dr. Taisha Narayan. Hospitalist admission of patient is appreciated.

## 2024-05-22 NOTE — H&P ADULT - PROBLEM SELECTOR PLAN 2
Start maintenance IVF  Liquid diet, graduate to reg diet as tolerated  PRN morphine for abdominal pain w/ bowel regimen Hx of pancreatic CA, in remission, follows at [facility] [place].   Start maintenance IVF  Liquid diet, graduate to reg diet as tolerated  PRN morphine for abdominal pain w/ bowel regimen

## 2024-05-22 NOTE — DIETITIAN INITIAL EVALUATION ADULT - OTHER INFO
73F w/ PMH of pancreatic cancer local mets, on radiation therapy, from [facility], CAD, stents, DMII, HLD p/w abdominal pain and back pain. Patient interviewed with daughter at bedside who supplements the history. She's been having abdominal pain chronically, thought to be from gastritis and usually improves with Maalox. She was also referred to GI for EGD by her oncologist. She has also had back pain chronically, recently has a new grandchild at home, and she has been more active. Her abdominal pain and back pain worsened on  pains for a chronically, worsened last Friday night, she went to [facility] on Saturday and had an MRI done which showed diffuse degenerative changes with multi-level disc herniations. 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 She was discharged and presents today for same symptoms. She reports pain is epigastric 8/10, sharp and burning, associated with poor oral intake for 1 day, nausea and 1 ep of vomiting, weight loss of 3-4 lbs over past week. Also reports worsened back pain, mid thoracic, radiated up the spine. Pain is worse with standing, better when hunched over. Denies fevers, chills, rhinitis, sore throat, diarrhea, dysuria. Denies chest pain, SOB. No other complaints, ROS otherwise normal.   In ED vitals stable, CBC/CMP unremarkable. Imaging noted for radiation induced pancreatitis, similar to CT abd 1 week ago. Admitted to  for subacute pancreatitis, and intractable back pain.  73F w/ PMH of pancreatic cancer local mets, on radiation therapy, from [facility] [place], CAD, stents, DMII, HLD p/w abdominal pain and back pain. Patient interviewed with daughter at bedside who supplements the history. She's been having abdominal pain chronically, thought to be from gastritis and usually improves with Maalox. She was also referred to GI for EGD by her oncologist. She has also had back pain chronically, recently has a new grandchild at home, and she has been more active. Her abdominal pain and back pain worsened on  pains for a chronically, worsened last Friday night, she went to [facility] [place] [facility] on Saturday and had an MRI done which showed diffuse degenerative changes with multi-level disc herniations. 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 She was discharged and presents today for same symptoms. She reports pain is epigastric 8/10, sharp and burning, associated with poor oral intake for 1 day, nausea and 1 ep of vomiting, weight loss of 3-4 lbs over past week. Also reports worsened back pain, mid thoracic, radiated up the spine. Pain is worse with standing, better when hunched over. Denies fevers, chills, rhinitis, sore throat, diarrhea, dysuria. Denies chest pain, SOB. No other complaints, ROS otherwise normal.   In ED vitals stable, CBC/CMP unremarkable. Imaging noted for radiation induced pancreatitis, similar to CT abd 1 week ago. Admitted to  for subacute pancreatitis, and intractable back pain.     Admit dx:  Acute pancreatitis  Visited pt while she was in bed  Daughter at bedside, provided additional information  RD bedscale weight 50 kg  109#  pt reports 4 # wt loss in one week  NFPE reveals muscle wasting, fat wasting, severe  Diet advanced from clears to low fiber, pt reports if she doesn't eat solid food early in the day, she has a bit of nausea  Pt has type 2 DM, suggest check HgbA1c  pt has Freestyle CGM  pt on lantus and glipizide at home, does have occasions of hypoglycemia  Suggest Confirm Goals of Care regarding nutrition support. Will provide nutrition/ hydration within goals of care.   Add Ensure max bid  Recommendations to follow in Plan/Intervention

## 2024-05-22 NOTE — ED PROVIDER NOTE - MUSCULOSKELETAL, MLM
Spine appears normal, range of motion is not limited, no muscle or joint tenderness. CNs 2-12 intact. NIH=0 GCS=15

## 2024-05-22 NOTE — PROGRESS NOTE ADULT - SUBJECTIVE AND OBJECTIVE BOX
HOSPITALIST ATTENDING PROGRESS NOTE    Chart and meds reviewed.  Patient seen and examined.    CC/ HPI Patient is a 73y old  Female who presents with a chief complaint of Acute pancreatitis without infection or necrosis     (22 May 2024 07:54)      Subjective: Seen with daughter at bedside.  Still with abdominal pain.     All other systems reviewed and found to be negative with the exception of what has been described above.    MEDICATIONS:  MEDICATIONS  (STANDING):  aspirin enteric coated 81 milliGRAM(s) Oral daily  atorvastatin 20 milliGRAM(s) Oral at bedtime  clopidogrel Tablet 75 milliGRAM(s) Oral daily  dextrose 10% Bolus 125 milliLiter(s) IV Bolus once  dextrose 5%. 1000 milliLiter(s) (50 mL/Hr) IV Continuous <Continuous>  dextrose 5%. 1000 milliLiter(s) (100 mL/Hr) IV Continuous <Continuous>  dextrose 50% Injectable 12.5 Gram(s) IV Push once  dextrose 50% Injectable 25 Gram(s) IV Push once  folic acid 1 milliGRAM(s) Oral daily  glucagon  Injectable 1 milliGRAM(s) IntraMuscular once  heparin   Injectable 5000 Unit(s) SubCutaneous every 12 hours  insulin glargine Injectable (LANTUS) 10 Unit(s) SubCutaneous at bedtime  insulin lispro (ADMELOG) corrective regimen sliding scale   SubCutaneous three times a day before meals  insulin lispro (ADMELOG) corrective regimen sliding scale   SubCutaneous at bedtime  lactated ringers. 1000 milliLiter(s) (100 mL/Hr) IV Continuous <Continuous>  multivitamin 1 Tablet(s) Oral daily  pantoprazole    Tablet 40 milliGRAM(s) Oral two times a day  pregabalin 100 milliGRAM(s) Oral three times a day  senna 2 Tablet(s) Oral at bedtime  thiamine 100 milliGRAM(s) Oral daily      Vital Signs Last 24 Hrs  T(C): 36.7 (22 May 2024 15:26), Max: 36.8 (22 May 2024 07:53)  T(F): 98 (22 May 2024 15:26), Max: 98.2 (22 May 2024 07:53)  HR: 70 (22 May 2024 15:26) (61 - 71)  BP: 154/62 (22 May 2024 15:26) (143/58 - 157/65)  BP(mean): 81 (21 May 2024 22:19) (81 - 81)  RR: 18 (22 May 2024 15:26) (17 - 20)  SpO2: 98% (22 May 2024 15:26) (97% - 100%)    Parameters below as of 22 May 2024 15:26  Patient On (Oxygen Delivery Method): room air        I&O's Summary      CAPILLARY BLOOD GLUCOSE      POCT Blood Glucose.: 215 mg/dL (22 May 2024 15:50)  POCT Blood Glucose.: 147 mg/dL (22 May 2024 11:53)  POCT Blood Glucose.: 113 mg/dL (22 May 2024 07:47)      PHYSICAL EXAM:    Constitutional: NAD, awake and alert, thin  HEENT:  EOMI, Normal Hearing, MMM  Neck: Soft and supple, No LAD, No JVD  Respiratory: Breath sounds are clear bilaterally, No wheezing, rales or rhonchi  Cardiovascular: S1 and S2, regular rate and rhythm, no Murmurs, gallops or rubs  Gastrointestinal: soft, mildly tender over left side, nondistended, no guarding, no rebound  Extremities: No peripheral edema  Vascular: 2+ peripheral pulses  Neurological: A/O x 3, no focal deficits  Musculoskeletal: 5/5 strength b/l upper and lower extremities  Skin: No rashes        LABS: All Labs Reviewed:                        11.4   4.17  )-----------( 256      ( 22 May 2024 06:07 )             34.4     05-22    141  |  111<H>  |  14  ----------------------------<  121<H>  4.1   |  27  |  0.68    Ca    8.9      22 May 2024 06:07  Phos  3.4     05-22  Mg     2.4     05-22    TPro  6.1  /  Alb  3.1<L>  /  TBili  0.9  /  DBili  0.3  /  AST  9<L>  /  ALT  19  /  AlkPhos  93  05-22          Blood Culture: 05-18 @ 22:43  Organism --  Gram Stain Blood -- Gram Stain --  Specimen Source Clean Catch None  Culture-Blood --        RADIOLOGY/EKG:    DVT PPX:    ADVANCED DIRECTIVE:    DISPOSITION:

## 2024-05-22 NOTE — H&P ADULT - PROBLEM SELECTOR PLAN 1
Started on flexeril and lyrica with morphine for breakthrough back/abd pain  Bowel regimen  Adjust pain medications as needed  PT evaluation  Fall precautions.

## 2024-05-22 NOTE — PROGRESS NOTE ADULT - ASSESSMENT
73F w/ PMH of pancreatic cancer local mets, on radiation therapy, from [facility], CAD, stents, DMII, HLD p/w abdominal pain and back pain.

## 2024-05-22 NOTE — DIETITIAN NUTRITION RISK NOTIFICATION - PHYSICAL ASSESSMENT SHOULDERS
[facility] [place]  HISTORY AND PHYSICAL      Patient: Margaux Morrow Date: 6/30/2021   female, 87 year old  Admit Date: 6/30/2021   Attending: Rashid Pierre MD        PRIMARY CARE PROVIDER:  Alexandro Sales MD     ATTENDING PHYSICIAN    Rashid Pierre MD      CHIEF COMPLAINT    Pt arrives via EMS from [place] with reports of rectal bleeding overnight, pt reports she was told there was blood in her depends when she was changed, pt denies any pain, pt is on Eliquis, is on dialysis, A&OX3- as documented by ER triage staff.     HPI    Margaux Mororw is a 87 year old female multiple medical problems as noted below who presents via EMS to the emergency room with complaints of rectal bleeding overnight.  Staff found bright red blood in the depends when the were changed last night.  Patient is on chronic anticoagulation with Eliquis the last dose was yesterday for paroxysmal atrial fibrillation.  Patient is on hemodialysis for end-stage renal disease today being her day of dialysis.  Patient had no abdominal pain.  Denies any nausea vomiting.  Denies any diarrhea.  Denies any none and a. Denies any hematemesis.  Denies any fevers or chills.  Denies any history of peptic ulcer disease.  Denies  alcohol use.  Denies any  nonsteroidal medication use.     Patient's guaiac was positive with nonmelanotic stool in the emergency room.  Patient's hemoglobin dropped to 6.7 from a recent 9.9 grams/deciliter.  Patient denies any postural orthostatic symptoms.    Patient has history of pulmonary hypertension.  Patient's oxygen saturation was 85% on room air and readily corrected to more than 90% with 2 L nasal oxygen supplementation in the emergency room.  Patient denies any active cough.  Patient denies any shortness of breath.  Patient denies PND orthopnea or peripheral edema or chest pain.                PAST MEDICAL HISTORY    Past Medical History:   Diagnosis Date   • Background diabetic  retinopathy(362.01) 3/26/2003   • Chronic kidney disease, stage 4 (severe) (CMS/[facility])    • Closed fracture of unspecified part of femur 12/22/2004   • diabetes    • Diffuse cystic mastopathy 9/29/2003   • Hydronephrosis of right kidney 12/11/2001    [facility]: Sepsis Evaluated by CT & stent. No cause found   • LENS REPLACEMENT OU 12/16/2004   • Obstruction of right ureteropelvic junction (UPJ) 03/28/2018   • Osteoporosis, unspecified 10/18/2007   • Other and unspecified hyperlipidemia 7/16/2007   • Other psoriasis 9/21/2001   • Paroxysmal atrial fibrillation (CMS/HCC) 2017    Identified on electrocardiogram when presented with a TIA. Maintained on Eliquis.   • PRIM OPEN ANGLE GLAUCOMA (slt mixed angle) 9/6/2002   • Pulmonary hypertension (CMS/[facility])     Echocardiogram is revealed tricuspid regurgitation. LV and RV function normal.   • TIA (transient ischemic attack) 2017    Presented with slurred speech. Found to be in atrial fibrillation. Good resolution. Maintained on Eliquis.   • Type 2 diabetes mellitus, controlled, with renal complications (CMS/[facility])    • Unspecified essential hypertension 1/21/2002   • Urinary incontinence        SURGICAL HISTORY    Past Surgical History:   Procedure Laterality Date   • Bevacizumab per 10 mg iv  1/3/12     right eye   • Cystoscopy,insert ureteral stent Right 03/29/2018    Carla: Rt UPJ obst, 6x22 JJ   • Cystourethroscopy/ureteroscopy Right 12/2001    [facility]: Sepsis & hydro, no stone @ [facility], stent removed 12/21/2001   • Destruc retinal lesn,photocoag  10/8/4    mac laser OD,  TH   • Destruc retinal lesn,photocoag  4/05    mac laser OD, Dr Banerjee; [facility] od x 2, os x 1 (DME OD, CME OS)   • Femur fracture surgery  2004    Done in [place] - \"plate with 9 screws in my leg\"\"   • Fracture surgery      right femur fx kam placement   • Removal gallbladder      Cholecystectomy (gallbladder)   • Remv cataract extracap insert lens  8/10/4    os TPH SA60AT 19.0   • Remv cataract extracap  insert lens  11.28.2006    Phaco w/ IOL od - tph   • Retinal detachment surgery  6/2013    OD Dr. Banerjee   • Triamcinolone per 10 mg  4/4/7     right eye   • Vag hyst,rmv tube/ovary  1990    Total Vag Hyst w BSO       FAMILY HISTORY    Family History   Problem Relation Age of Onset   • Myocardial Infarction Brother 48   • Cancer Sister         liver/breast   • * Mother 95        old age   • * Father 66        mental health issues   • Diabetes Sister 79   • * Brother    • Diabetes Brother    • Diabetes Brother        SOCIAL HISTORY    Social History     Tobacco Use   • Smoking status: Never Smoker   • Smokeless tobacco: Never Used   Vaping Use   • Vaping Use: never used   Substance Use Topics   • Alcohol use: Not Currently     Comment: rarely   • Drug use: No       CURRENT MEDICATIONS    Outpatient Medications Marked as Taking for the 6/30/21 encounter (Hospital Encounter)   Medication Sig Dispense Refill   • saccharomyces boulardii (Florastor) 250 MG capsule Take 250 mg by mouth 2 times daily.     • calcium carbonate (OS-IVA) 1250 (500 Ca) MG tablet Take 1 tablet by mouth daily.     • magnesium hydroxide (Milk of Magnesia) 400 MG/5ML suspension Take 30 mLs by mouth daily as needed for Constipation.     • bisacodyl (DULCOLAX) 10 MG suppository Place 10 mg rectally daily as needed for Constipation.     • Sodium Phosphates (DISPOSABLE ENEMA RE) Place 1 enema rectally daily as needed (Constipation).     • acetaminophen (TYLENOL) 325 MG tablet Take 650 mg by mouth every 4 hours as needed for Pain.     • Dextrose, Diabetic Use, (INSTA-GLUCOSE PO) Take 1 Tube by mouth as needed (BS >200).     • amoxicillin-clavulanate (Augmentin) 500-125 MG per tablet Take 1 tablet by mouth every 12 hours for 2 days. Take with food. (Patient taking differently: Take 1 tablet by mouth 2 times daily. Take with food.) 3 tablet 0   • predniSONE (DELTASONE) 20 MG tablet Take 2 tablets by mouth daily (with breakfast). Do not start before June  27, 2021. END 6/30/21 4 tablet 0   • isosorbide mononitrate (IMDUR) 30 MG 24 hr tablet Take 30 mg by mouth daily.     • metoPROLOL succinate (TOPROL-XL) 25 MG 24 hr tablet Take 25 mg by mouth daily.  45 tablet 1   • polyethylene glycol (MiraLax) 17 g packet Take 17 g by mouth as needed (constipation).     • insulin glargine (Lantus SoloStar) 100 UNIT/ML pen-injector Inject 8 Units into the skin nightly. 3 mL 0   • NIFEdipine XL (PROCARDIA XL) 30 MG 24 hr tablet Take 4 tablets by mouth daily. Do not start before June 8, 2021. 120 tablet 0   • nystatin (MYCOSTATIN) 464707 UNIT/GM powder Apply topically every 12 hours. 30 g 0   • allopurinol (ZYLOPRIM) 100 MG tablet TAKE ONE-HALF TABLET BY MOUTH EVERY DAY (Patient taking differently: Take 50 mg by mouth daily. ) 45 tablet 1   • dorzolamide (TRUSOPT) 2 % ophthalmic solution Place 1 drop into right eye 2 times daily. 10 mL 6   • timolol (TIMOPTIC) 0.5 % ophthalmic solution Place 1 drop into both eyes 2 times daily. 15 mL 0   • brimonidine (ALPHAGAN) 0.2 % ophthalmic solution Place 1 drop into both eyes 2 times daily. 5 mL 6   • terazosin (HYTRIN) 10 MG capsule TAKE ONE CAPSULE BY MOUTH NIGHTLY 90 capsule 1   • atorvastatin (LIPITOR) 40 MG tablet Take 1 tablet by mouth nightly. 90 tablet 3   • docusate sodium-sennosides (SENOKOT S) 50-8.6 MG per tablet Take 1 tablet by mouth daily as needed.      • loratadine (CLARITIN) 10 MG tablet Take 10 mg by mouth as needed.      • cholecalciferol (VITAMIN D3) 1000 UNITS tablet Take 1,000 Units by mouth every morning.     • Multiple Vitamins-Minerals (MULTIVITAMIN PO) Take 1 tablet by mouth daily.        Medications Prior to Admission   Medication Sig Dispense Refill   • saccharomyces boulardii (Florastor) 250 MG capsule Take 250 mg by mouth 2 times daily.     • calcium carbonate (OS-IVA) 1250 (500 Ca) MG tablet Take 1 tablet by mouth daily.     • magnesium hydroxide (Milk of Magnesia) 400 MG/5ML suspension Take 30 mLs by mouth daily  as needed for Constipation.     • bisacodyl (DULCOLAX) 10 MG suppository Place 10 mg rectally daily as needed for Constipation.     • Sodium Phosphates (DISPOSABLE ENEMA RE) Place 1 enema rectally daily as needed (Constipation).     • acetaminophen (TYLENOL) 325 MG tablet Take 650 mg by mouth every 4 hours as needed for Pain.     • Dextrose, Diabetic Use, (INSTA-GLUCOSE PO) Take 1 Tube by mouth as needed (BS >200).     • amoxicillin-clavulanate (Augmentin) 500-125 MG per tablet Take 1 tablet by mouth every 12 hours for 2 days. Take with food. (Patient taking differently: Take 1 tablet by mouth 2 times daily. Take with food.) 3 tablet 0   • predniSONE (DELTASONE) 20 MG tablet Take 2 tablets by mouth daily (with breakfast). Do not start before June 27, 2021. END 6/30/21 4 tablet 0   • isosorbide mononitrate (IMDUR) 30 MG 24 hr tablet Take 30 mg by mouth daily.     • metoPROLOL succinate (TOPROL-XL) 25 MG 24 hr tablet Take 25 mg by mouth daily.  45 tablet 1   • polyethylene glycol (MiraLax) 17 g packet Take 17 g by mouth as needed (constipation).     • insulin glargine (Lantus SoloStar) 100 UNIT/ML pen-injector Inject 8 Units into the skin nightly. 3 mL 0   • NIFEdipine XL (PROCARDIA XL) 30 MG 24 hr tablet Take 4 tablets by mouth daily. Do not start before June 8, 2021. 120 tablet 0   • nystatin (MYCOSTATIN) 006108 UNIT/GM powder Apply topically every 12 hours. 30 g 0   • allopurinol (ZYLOPRIM) 100 MG tablet TAKE ONE-HALF TABLET BY MOUTH EVERY DAY (Patient taking differently: Take 50 mg by mouth daily. ) 45 tablet 1   • dorzolamide (TRUSOPT) 2 % ophthalmic solution Place 1 drop into right eye 2 times daily. 10 mL 6   • timolol (TIMOPTIC) 0.5 % ophthalmic solution Place 1 drop into both eyes 2 times daily. 15 mL 0   • brimonidine (ALPHAGAN) 0.2 % ophthalmic solution Place 1 drop into both eyes 2 times daily. 5 mL 6   • terazosin (HYTRIN) 10 MG capsule TAKE ONE CAPSULE BY MOUTH NIGHTLY 90 capsule 1   • atorvastatin  (LIPITOR) 40 MG tablet Take 1 tablet by mouth nightly. 90 tablet 3   • docusate sodium-sennosides (SENOKOT S) 50-8.6 MG per tablet Take 1 tablet by mouth daily as needed.      • loratadine (CLARITIN) 10 MG tablet Take 10 mg by mouth as needed.      • cholecalciferol (VITAMIN D3) 1000 UNITS tablet Take 1,000 Units by mouth every morning.     • Multiple Vitamins-Minerals (MULTIVITAMIN PO) Take 1 tablet by mouth daily.     • apixaBAN (Eliquis) 2.5 MG Tab Take 1 tablet by mouth every 12 hours. HOLD UNTIL PLT COUNT IS RECHECKED 180 tablet 1   • Accu-Chek Julisa Plus test strip test ONCE daily 100 strip 3       ALLERGIES    ALLERGIES:   Allergen Reactions   • Riomet VOMITING and DIARRHEA     metformin   • Opioid Analgesics      ? altered level of mentation,   • Oxycodone Other (See Comments)     Pt became very lethargic       REVIEW OF SYSTEMS    All 13 Review of Systems negative except for what's listed in the HPI.      PHYSICAL EXAM    Vital 24 Hour Range Most Recent Value   Temperature Temp  Min: 98.8 °F (37.1 °C)  Max: 98.8 °F (37.1 °C) 98.8 °F (37.1 °C)   Pulse Pulse  Min: 69  Max: 87 71   Respiratory Resp  Min: 18  Max: 18 18   Blood Pressure BP  Min: 141/99  Max: 175/74 (!) 141/99   Pulse Oximetry SpO2  Min: 97 %  Max: 98 % 98 %   Arterial BP No data recorded     O2 No data recorded       Vital Most Recent Value First Value   Weight 70.6 kg Weight: 70.6 kg   Height 5' 2\" (157.5 cm) Height: 5' 2\" (157.5 cm)   BMI 28.47 N/A       Examination:    General:  Obese / elderly / pale/ frail/ in nad /non toxic   HEENT: normocephalic, atraumatic, normal conjunctivae and lids, injected conjunctivae, PERRLA and EOMI  Neck:  trachea midline and normal thyroid  CV: regular rate and rhythm and no murmurs, rubs, or thrills rt tunneled HD cathter  Resp: clear to auscultation bilaterally  Abd: soft, nontender, nondistended and no hepatosplenomegaly  Ext: no rashes, lesions, or ulcers noted and no induration, subcutaneous nodules, or  tightening noted, no clubbing, cyanosis, or ischemia and edema absent   Neuro: CN 2-12 grossly intact, normal sensation  and no focal deficits noted  Psych: normal judgement and insight and oriented to time, place and person        LABS    Recent Labs   Lab 06/30/21  1044 06/26/21  0703 06/25/21  0613   SODIUM 136 138 140   POTASSIUM 5.0 4.3 4.0   CHLORIDE 99 103 103   CO2 28 30 28   BUN 91* 31* 41*   CREATININE 4.71* 3.54* 4.43*   GLUCOSE 338* 113* 91   WBC 11.6* 7.5 8.3   HGB 6.7* 9.9* 10.0*   HCT 22.2* 32.1* 32.6*    56* 48*   PT 11.7  --   --    INR 1.1  --   --      Recent Labs   Lab 06/30/21  1044   RAPDTR 0.04     No results found. However, due to the size of the patient record, not all encounters were searched. Please check Results Review for a complete set of results.   Results for orders placed or performed in visit on 05/11/14   URINE CULTURE    Specimen: Urine Cleancatch/Midstream   Result Value Ref Range    Specimen Description URINE, CLEAN CATCH/MIDSTREAM URINE     CULTURE NO GROWTH.     REPORT STATUS 05/12/2014 FINAL      Lab Results   Component Value Date    MONTANA 52 06/22/2021    USPG 1.017 06/26/2021    UPROT >=500 (A) 06/26/2021    UWBC Moderate (A) 06/26/2021    URBC Negative 06/26/2021    5UNITR n 10/14/2002    UPH 5.0 06/26/2021    UBACTR NONE SEEN 01/19/2020       IMAGING & OTHER STUDIES    US Renal Complete (Comp Urinary System)    Result Date: 6/20/2021  Narrative: US RENAL COMPLETE CLINICAL INFORMATION:  Acute renal insufficiency. History of chronic renal insufficiency. COMPARISON: 5/29/2021. TECHNIQUE: Real-time sonographic evaluation of the kidneys and bladder is performed by the sonographic technologist. Static gray-scale and color Doppler  images are reviewed.  FINDINGS: The right kidney measures 10.0 x 4.8 x 4.1 cm. The kidney is diffusely hyperechoic. Mild cortical thinning. Dilated renal pelvis. No definite hydronephrosis.  The left kidney measures 10.0 x 5.0 x 4.5 cm.  Hyperechoic renal echotexture. Cortical thinning. No hydronephrosis. Nondistended bladder. Minimal upper abdominal ascites.     Impression: IMPRESSION: 1.  Hyperechoic kidneys bilaterally with diffuse cortical thinning suggestive of chronic medical renal disease. 2.  No hydronephrosis. 3. Prominent right renal pelvis. 4. Minimal upper abdominal ascites.     XR Chest AP or PA    Result Date: 6/30/2021  Narrative: EXAM: XR CHEST AP OR PA DATE: 6/30/2021 11:01 AM HISTORY: chest pain COMPARISON: 06/20/2021 FINDINGS: Heart size and vasculature are within normal limits. The lung volumes are normal. There is better aeration at the right lung base with some persistent blunted right lateral costophrenic sulcus and patchy opacity. Left lung without focal abnormality. Again identified is a focal nodule projected over the right midlung peripherally stable from prior. There is no pneumothorax.  The regional skeleton is unremarkable. Right IJ double lumen catheter overlies superior vena cava in appropriate position.     Impression: IMPRESSION: 1. Better aeration but with some persistent effusion and/or atelectasis right lung base. 2. Stable nodule right midlung. 3. Right IJ double lumen catheter appears appropriate without complication    XR Chest AP or PA    Result Date: 6/20/2021  Narrative: EXAM: XR CHEST AP OR PA INDICATION: sob FINDINGS: AP portable chest 2:24 PM 2 cm focal nodule in the right mid/upper lung laterally, unchanged from 5/20/2021. Interval development small right pleural effusion and small amount pneumonitis or atelectasis in the right lung base. Left lung is expanded clear. Heart size and pulmonary vascularity are normal. Mild calcification aortic arch.     Impression: IMPRESSION: Interval development small right pleural effusion perhaps a small amount of right basilar infiltrate/atelectasis. Otherwise no change from 5/28/2021.     US Abdomen Complete    Result Date: 6/25/2021  Narrative: US ABDOMEN COMPLETE  severe CLINICAL HISTORY: Thrombocytopenia.  Concern for liver disease or splenomegaly. COMPARISON: None FINDINGS: Abdominal aorta normal in course and contour.  Proximal mid and distal aorta measure 1.9, 1.7, 1.3 cm in short axis.  Normal color flow in the inferior vena cava. Pancreatic head cyst measuring 1.8 x 2.3 x 1.8 cm.  Mildly prominent pancreatic duct measuring 2.8 mm short axis.  The liver measures 13.8 cm long.  Normal echotexture.  No bile duct dilatation.  Common bile duct measures 3.7 mm.  Normal color flow in the portal veins and hepatic veins. Spleen measures 10.8 cm long. Right kidney measures 9.9 x 5.1 x 3.6 cm.  Mild right-sided hydronephrosis.  Increased echotexture to the right kidney.  No stones appreciated. Left kidney measures 9.8 x 4.5 x 4.1 cm.  Increased echotexture to left kidney.  No hydronephrosis.  No stones. Moderate size right-sided pleural effusion identified.     Impression: IMPRESSION: 1.  Moderate right-sided pleural effusion. 2.  2.8 cm pancreatic head cyst.  Mild dilatation of the pancreatic duct. Would recommend correlation with dedicated CT scan or MRI of the pancreas. 3.  Mild right-sided hydronephrosis, etiology uncertain. 4.  Echogenic kidneys compatible with medical renal disease. 5.  Normal spleen.  No splenomegaly    IR DIALYSIS CATHETER PLACEMENTS/CHECKS/EXCHANGES (TEMPORARY/PERMANENT)    Result Date: 6/21/2021  Narrative: PROCEDURES: 1. Ultrasound guidance for vascular access (permanent image on file). 2. Fluoroscopic guidance for central venous catheter placement. 3. Centrally inserted tunneled catheter with cuff; patient's age greater than 5; no port or pump. : Adan Aguilera M.D. FLUOROSCOPY TIME: 0.2 minutes. INDICATIONS: Renal failure. CONSENT: The procedure, benefits, alternatives and risks were discussed. Risks included but were not limited to pain, bleeding and infection which may necessitate catheter removal. All questions were answered. Verbal and  informed consent was obtained. A timeout was performed prior to initiation of the procedure. TECHNIQUE AND FINDINGS: The patient was placed supine on the angiography table. Sonographic evaluation of the right neck demonstrated the the external jugular vein to have Doppler flow and compressibility, compatible with patency. The right neck was prepped and draped in the usual sterile fashion. The skin and superficial tissues were infiltrated with lidocaine and a small skin incision was made. Under direct ultrasound guidance, the vein was accessed using a micropuncture needle. The tip is visualized within the vessel lumen and a permanent image was recorded. A 0.018 inch guidewire was advanced to the right atrium under fluoroscopy. The needle was exchanged for a 5-Singaporean micropuncture sheath. The dilator and 0.018 guidewire were removed, a 0.035 inch guidewire was advanced into the IVC. Position of the catheter entry site within the chest was planned under fluoroscopic guidance. The skin and subcutaneous tissues from the catheter entry site to the venotomy were infiltrated with lidocaine 1%. A small skin incision was made within the chest. A 19cm cuffed dialysis catheter was subcutaneously tunneled from the chest to the venotomy site. The micropuncture sheath was then exchanged over the guidewire for a valved catheter peel-away sheath following serial dilation. The catheter was then placed through the peel-away sheath and the peel-away sheath was removed. The catheter tip was positioned within the superior right  atrium. Each catheter lumen was flushed and demonstrated excellent blood return. The lumens were then flushed with sodium citrate. The catheter was secured to the skin with nonabsorbable 2.0 interrupted sutures. The neck incision was closed with tissue glue. The patient tolerated the procedure well. COMPLICATIONS: None.     Impression: IMPRESSION: Successful placement of a centrally inserted tunneled dialysis  catheter. DISPOSITION: The catheter may be used as need for dialysis.    CT Abd Pelvis for KidneyStones WO Contra    Result Date: 6/26/2021  Narrative: EXAM:  CT ABDOMEN PELVIS FOR KIDNEY STONES WO CONTRAST DATE OF EXAM: 6/26/2021 9:38 AM 87 years-old Female with presenting history of Urinary tract stone, symptomatic/complicated. ADDITIONAL HISTORY:  None. COMPARISON: CT chest May 29, 2021, January 19, 2020, July 30, 2019. Multiple helical scans of the abdomen and pelvis were obtained without oral or intravenous contrast. No visible urinary tract source of pain. Both kidneys are normal in size. There are no calculi in the renal collecting systems. The ureters are normal in course and caliber. There is no gross focal lesion in the kidneys on this noncontrast study. Previously noted right UPJ dilatation is no longer present. The examination is not optimized for other pathologies, but the visualized portions of the liver, spleen, pancreas, and adrenal glands appear normal on this noncontrast study. The gallbladder is surgically removed. The bowel and mesentery appears within normal limits. The appendix is normal. There is a tiny umbilical hernia containing a small portion of transverse colon, without obstruction or visible incarceration. The hernia is smaller than on previous study January 19, 2020. There is no lymphadenopathy or free fluid in the abdomen or pelvis.  There are some scattered vascular calcifications in the abdomen and pelvis. Right hip prosthesis causes mild streak artifact. In the pelvis, the urinary bladder is only minimally distended and is otherwise grossly unremarkable. Small right pleural effusion and right lung base atelectasis are present, similar to recent CT scan chest.     Impression: IMPRESSION: No urinary tract source of pain identified. Small umbilical hernia, without complicating process seen, less prominent than on January 19, 2000. Right pleural effusion and right lung base atelectasis.          Assessment/Plan:         Reason for Test:  SARS CoV-2 test: ordered due to asymptomatic screening for admission (non PUI)    Acute GI bleeding with acute blood loss anemia - will continue to hold Eliquis/to large peripheral IVs will be placed at all times during the hospitalization/transfusion 1-2 units of PRBC followed by hemodialysis or during hemodialysis.  High-dose PPI therapy.  Serial H&H.  GI eval for EGD.clear liquid diet.        Paroxysmal atrial fibrillation with elevated chads Vasc score- hold Eliquis.  Risks benefits and alternatives discussed with the patient of holding anticoagulation therapy due to active symptomatic GI bleeding.  Will resume anticoagulation promptly once GI workup ensures  there is no continued bleeding      ESRD - on hemodialysis via Rt  tunneled cathter .  Patient will be continued on hemodialysis starting today.  Nephrology was notified of her admission by ER.           Insulin-requiring diabetes mellitus with diabetes-related complications. - consistent carbohydrate diet once GI clears patient's oral intake.  Will add patient short-acting insulin on a sliding scale basis while patient is NPO.  Basal insulin will be started once patient's oral intake is resumed.  We will check hemoglobin A1c      Pulmonary hypertension - patient is needing 2 L of oxygen supplementation.  Chest x-ray is otherwise unremarkable.      HTN -  Elevated bps. ?  Due to upper GI bleeding.    Gout- In remission.       Septic Shock Evaluation: Tissue perfusion assessment performed    Smoking status- non smoker    Nutrition status- appropriate    Body mass index is 28.47 kg/m². - appropriate    Code status- Prior     DVT Prophylaxis - SCDs    The patients treatment plans were discussed with patient and RN      Is the patient expected to require a two midnight stay in the hospital? Yes I certify that I expect inpatient services for greater than two midnights are medically necessary for this patient.  Please see H&P and MD Progress Notes for additional information about the patient's course of treatment.    I personally spent 45 minutes today in the care and management of this patient and more than 50% of my time was spent in counseling and coordination of care.      Rashid Pierre MD    6/30/2021    1:36 PM

## 2024-05-22 NOTE — H&P ADULT - NSHPLABSRESULTS_GEN_ALL_CORE
LABS:  cret                        12.7   4.90  )-----------( 252      ( 21 May 2024 22:47 )             38.4     05-21    137  |  107  |  20  ----------------------------<  153<H>  4.4   |  26  |  0.79    Ca    9.7      21 May 2024 22:47    TPro  7.3  /  Alb  3.5  /  TBili  1.0  /  DBili  x   /  AST  25  /  ALT  22  /  AlkPhos  107  05-21    < from: CT Abdomen and Pelvis No Cont (05.22.24 @ 00:22) >    IMPRESSION:  Edema adjacent to the pancreatic head and neck, similar to prior. Most   likely postradiation changes. Superimposed mild acute or recurrent   pancreatitis is possible.        --- End of Report ---    < end of copied text >

## 2024-05-22 NOTE — H&P ADULT - HISTORY OF PRESENT ILLNESS
73F p/w abdominal pain and back pain. Shes been having these pains for a chronically, worsened last friday night, Pain is epigastric 8/10, sharp and burning, radiating to back. Abdominal pain improved with maalox. She had an MRI done on saturday at [facility] which showed  mild cord impingement C5-C6 and c6-c7, disc herniation t10-11 t12-l1. She has been more active, Pain is worse with standing, better when hunched over. Has had decreased appetite, weight loss 3-4 lbs in the last week. Has had nausea, 1 ep of vomiting today, no diarrhea, dysuria 73F w/ PMH of pancreatic cancer local mets, on radiation therapy, from [facility], CAD, stents, DMII, HLD p/w abdominal pain and back pain. Patient interviewed with daughter at bedside who supplements the history. She's been having abdominal pain chronically, thought to be from gastritis and usually improves with Maalox. She was also referred to GI for EGD by her oncologist. She has also had back pain chronically, recently has a new grandchild at home, and she has been more active. Her abdominal pain and back pain worsened on  pains for a chronically, worsened last Friday night, she went to [facility] on Saturday and had an MRI done which showed diffuse degenerative changes with multi-level disc herniations. 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 She was discharged and presents today for same symptoms. She reports pain is epigastric 8/10, sharp and burning, associated with poor oral intake for 1 day, nausea and 1 ep of vomiting, weight loss of 3-4 lbs over past week. Also reports worsened back pain, mid thoracic, radiated up the spine. Pain is worse with standing, better when hunched over. Denies fevers, chills, rhinitis, sore throat, diarrhea, dysuria. Denies chest pain, SOB. No other complaints, ROS otherwise normal.   In ED vitals stable, CBC/CMP unremarkable. Imaging noted for radiation induced pancreatitis, similar to CT abd 1 week ago. Admitted to  for subacute pancreatitis, and intractable back pain.

## 2024-05-22 NOTE — DIETITIAN INITIAL EVALUATION ADULT - PERTINENT LABORATORY DATA
05-22    141  |  111<H>  |  14  ----------------------------<  121<H>  4.1   |  27  |  0.68    Ca    8.9      22 May 2024 06:07  Phos  3.4     05-22  Mg     2.4     05-22    TPro  6.1  /  Alb  3.1<L>  /  TBili  0.9  /  DBili  0.3  /  AST  9<L>  /  ALT  19  /  AlkPhos  93  05-22  POCT Blood Glucose.: 113 mg/dL (05-22-24 @ 07:47)

## 2024-05-22 NOTE — ED PROVIDER NOTE - CONSTITUTIONAL NEGATIVE STATEMENT, MLM
no fever and no chills. Island Pedicle Flap Text: The defect edges were debeveled with a #15 scalpel blade.  Given the location of the defect, shape of the defect and the proximity to free margins an island pedicle advancement flap was deemed most appropriate.  Using a sterile surgical marker, an appropriate advancement flap was drawn incorporating the defect, outlining the appropriate donor tissue and placing the expected incisions within the relaxed skin tension lines where possible.    The area thus outlined was incised deep to adipose tissue with a #15 scalpel blade.  The skin margins were undermined to an appropriate distance in all directions around the primary defect and laterally outward around the island pedicle utilizing iris scissors.  There was minimal undermining beneath the pedicle flap.

## 2024-05-22 NOTE — ED PROVIDER NOTE - CLINICAL SUMMARY MEDICAL DECISION MAKING FREE TEXT BOX
Pancreatic and neuropathic pain, also CT with edema or peripancreatic edema and pancreatitis likely from radiation exposure for treatment, unable to tolerate PO, intractable pain, will admit. Spoke with Dr. Taisha Narayan. Hospitalist admission of patient is appreciated.

## 2024-05-22 NOTE — PATIENT PROFILE ADULT - FALL HARM RISK - RISK INTERVENTIONS
Assistance OOB with selected safe patient handling equipment/Assistance with ambulation/Communicate Fall Risk and Risk Factors to all staff, patient, and family/Discuss with provider need for PT consult/Monitor gait and stability/Provide patient with walking aids - walker, cane, crutches/Reinforce activity limits and safety measures with patient and family/Use of alarms - bed, chair and/or voice tab/Visual Cue: Yellow wristband/Bed in lowest position, wheels locked, appropriate side rails in place/Call bell, personal items and telephone in reach/Instruct patient to call for assistance before getting out of bed or chair/Non-slip footwear when patient is out of bed/[place] to call system/Physically safe environment - no spills, clutter or unnecessary equipment/Purposeful Proactive Rounding/Room/bathroom lighting operational, light cord in reach

## 2024-05-22 NOTE — DIETITIAN INITIAL EVALUATION ADULT - NS FNS DIET ORDER
Diet, Clear Liquid (05-22-24 @ 01:41)  Diet, Regular (05-22-24 @ 01:18)  Diet, Low Fiber (05-22-24 @ 01:18)

## 2024-05-22 NOTE — DIETITIAN NUTRITION RISK NOTIFICATION - TREATMENT: THE FOLLOWING DIET HAS BEEN RECOMMENDED
Diet, Low Fiber (05-22-24 @ 10:06) [Active]  Diet, Regular (05-22-24 @ 01:18) [Available for Activation]

## 2024-05-22 NOTE — DIETITIAN INITIAL EVALUATION ADULT - ADD RECOMMEND
Maintain low fiber diet  Add Ensure max bid  MVI w/ minerals daily to ensure 100% RDA met   Record PO intake in EMR after each meal (nursing.)   Consider adding thiamine 100 mg daily 2/2 poor PO intake/ malnutrition  Monitor bowel movements, if no BM for >3 days, consider implementing bowel regimen.   suggest Confirm Goals of Care regarding nutrition support. Will provide nutrition/ hydration within goals of care.   Check Hgba1c  A target glucose range of 140-180 mg/dL is recommended for most critically ill and non-critically ill patients.   Monitor PO intake, tolerance, labs and weight.

## 2024-05-22 NOTE — PHYSICAL THERAPY INITIAL EVALUATION ADULT - PERTINENT HX OF CURRENT PROBLEM, REHAB EVAL
73F w/ PMH of pancreatic cancer local mets, on radiation therapy, from [facility] [place] [place], CAD, stents, DMII, HLD p/w abdominal pain and back pain. Patient interviewed with daughter at bedside who supplements the history. She's been having abdominal pain chronically, thought to be from gastritis and usually improves with Maalox. She was also referred to GI for EGD by her oncologist. She has also had back pain chronically, recently has a new grandchild at home, and she has been more active. Her abdominal pain and back pain worsened on  pains for a chronically, worsened last Friday night, she went to [facility] [place] [place] on Saturday and had an MRI done which showed diffuse degenerative changes with multi-level disc herniations. 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 She was discharged and presents today for same symptoms. She reports pain is epigastric 8/10, sharp and burning, associated with poor oral intake for 1 day, nausea and 1 ep of vomiting, weight loss of 3-4 lbs over past week. Also reports worsened back pain, mid thoracic, radiated up the spine. Pain is worse with standing, better when hunched over. Denies fevers, chills, rhinitis, sore throat, diarrhea, dysuria. Denies chest pain, SOB. No other complaints, ROS otherwise normal.   In ED vitals stable, CBC/CMP unremarkable. Imaging noted for radiation induced pancreatitis, similar to CT abd 1 week ago. Admitted to  for subacute pancreatitis, and intractable back pain.

## 2024-05-22 NOTE — ED PROVIDER NOTE - PROGRESS NOTE DETAILS
Carol SWANSON: Pancreatic and neuropathic pain, also CT with edema or peripancreatic edema and pancreatitis likely from radiation exposure for treatment, unable to tolerate PO, intractable pain, will admit. Spoke with Dr. Taisha Narayan. Hospitalist admission of patient is appreciated.

## 2024-05-22 NOTE — PHYSICAL THERAPY INITIAL EVALUATION ADULT - FOLLOWS COMMANDS/ANSWERS QUESTIONS, REHAB EVAL
"Discussed the pathophysiology of anxiety/depression episodes and the various symptoms seen associated with anxiety episodes. Discussed possible triggers including fatigue, depression, stress, and chemicals such as alcohol, caffeine and certain drugs. Discussed the treatment including an aerobic exercise program, adequate rest, and both rescue meds and maintenance meds.   For your anxiety:   1. Consider therapy - CBT - cognitive behavioral therapy - Ruy Cabral's card given to patient.  2. \"The Chemistry of Calm\" by Amadou Blue   3. \"Hope and Help for your Nerves\" by Leelee Lezama   4. Vitamin D 7920-3870 IU daily   5. Valerian root extract for relaxation and sleep OR Melatonin at bedtime.  Continue with Zoloft 200 mg daily with trazodone 50 mg nightly.    Trial of Buspar 10 mg nightly then as needed if needed/tolerate for increased anxiety - max dose 30 mg two times a day.     Call Central Scheduling [phone] to schedule your mammogram and colonoscopy.       "
100% of the time

## 2024-05-22 NOTE — DIETITIAN INITIAL EVALUATION ADULT - NAME AND PHONE
Kelsey Ohara RDN, CDN, [facility]      [phone]   [email or web address]

## 2024-05-22 NOTE — DIETITIAN INITIAL EVALUATION ADULT - PROBLEM SELECTOR PLAN 2
Hx of pancreatic CA, in remission, follows at [facility].   Start maintenance IVF  Liquid diet, graduate to reg diet as tolerated  PRN morphine for abdominal pain w/ bowel regimen

## 2024-05-22 NOTE — DIETITIAN INITIAL EVALUATION ADULT - ORAL INTAKE PTA/DIET HISTORY
. Patient's daugter shops and cooks for her.  Pt eats three meals a day.  Breakfast is substantial -eggs, toast, dinner is lightest meal.  Pt likes mcknight.  Pt needs to eat  breakfast early, otherwise her stomach becomes upset.   Patient's daugter shops and cooks for her.  Pt eats three meals a day.  Breakfast is substantial -eggs, toast, dinner is lightest meal.  Pt likes mcknight.  Pt needs to eat  breakfast early, otherwise her stomach becomes upset.  PO intake estimated < 75% ENN > one month

## 2024-05-22 NOTE — ED PROVIDER NOTE - OBJECTIVE STATEMENT
73 year old female with PMH of pancreatic cancer local mets, on radiation therapy, from [facility], CAD, stents, DMII, HLD, second visit for the same to the ER, previously had CT, and labs and was told to try to promote PO at home, and return. Today with wrosening pain, post prandial exacerbation of the pain, and pain keeping her up. Pt suffers from cancer pain as well, and had a recent MRI that also showed he has some chronic disc disease as well. No fever or chills. No melena. No hematochezia. Patient is ambulatory but with extreme pain. Unable to care for herself at the time, as she has no appetite, and pain is intractable, 10/10. At  home is prescribed oxys but as per family declines taking them routinely as needed .

## 2024-05-22 NOTE — ED PROVIDER NOTE - NSICDXPASTSURGICALHX_GEN_ALL_CORE_FT
PAST SURGICAL HISTORY:  H/O lithotripsy     S/P cardiac cath      Tazorac Counseling:  Patient advised that medication is irritating and drying.  Patient may need to apply sparingly and wash off after an hour before eventually leaving it on overnight.  The patient verbalized understanding of the proper use and possible adverse effects of tazorac.  All of the patient's questions and concerns were addressed.

## 2024-05-22 NOTE — DIETITIAN INITIAL EVALUATION ADULT - PERTINENT MEDS FT
MEDICATIONS  (STANDING):  aspirin enteric coated 81 milliGRAM(s) Oral daily  atorvastatin 20 milliGRAM(s) Oral at bedtime  clopidogrel Tablet 75 milliGRAM(s) Oral daily  dextrose 10% Bolus 125 milliLiter(s) IV Bolus once  dextrose 5%. 1000 milliLiter(s) (50 mL/Hr) IV Continuous <Continuous>  dextrose 5%. 1000 milliLiter(s) (100 mL/Hr) IV Continuous <Continuous>  dextrose 50% Injectable 25 Gram(s) IV Push once  dextrose 50% Injectable 12.5 Gram(s) IV Push once  folic acid 1 milliGRAM(s) Oral daily  glucagon  Injectable 1 milliGRAM(s) IntraMuscular once  heparin   Injectable 5000 Unit(s) SubCutaneous every 12 hours  insulin glargine Injectable (LANTUS) 10 Unit(s) SubCutaneous at bedtime  insulin lispro (ADMELOG) corrective regimen sliding scale   SubCutaneous at bedtime  insulin lispro (ADMELOG) corrective regimen sliding scale   SubCutaneous three times a day before meals  lactated ringers. 1000 milliLiter(s) (100 mL/Hr) IV Continuous <Continuous>  multivitamin 1 Tablet(s) Oral daily  pantoprazole    Tablet 40 milliGRAM(s) Oral two times a day  pregabalin 100 milliGRAM(s) Oral three times a day  senna 2 Tablet(s) Oral at bedtime  thiamine 100 milliGRAM(s) Oral daily    MEDICATIONS  (PRN):  acetaminophen     Tablet .. 650 milliGRAM(s) Oral every 6 hours PRN Temp greater or equal to 38C (100.4F), Mild Pain (1 - 3)  aluminum hydroxide/magnesium hydroxide/simethicone Suspension 30 milliLiter(s) Oral every 4 hours PRN Dyspepsia  bisacodyl Suppository 10 milliGRAM(s) Rectal daily PRN Constipation  cyclobenzaprine 5 milliGRAM(s) Oral three times a day PRN Muscle Spasm  dextrose Oral Gel 15 Gram(s) Oral once PRN Blood Glucose LESS THAN 70 milliGRAM(s)/deciliter  melatonin 3 milliGRAM(s) Oral at bedtime PRN Insomnia  morphine  - Injectable 2 milliGRAM(s) IV Push every 4 hours PRN Severe Pain (7 - 10)  ondansetron Injectable 4 milliGRAM(s) IV Push every 8 hours PRN Nausea and/or Vomiting  polyethylene glycol 3350 17 Gram(s) Oral daily PRN Constipation

## 2024-05-22 NOTE — DIETITIAN INITIAL EVALUATION ADULT - ETIOLOGY
r/t inability to consume sufficient energy/protein 2/2 acute pancreatitis, hx of pancreatic cancer with mets, GERD

## 2024-05-22 NOTE — H&P ADULT - NSHPPHYSICALEXAM_GEN_ALL_CORE
T(C): 36.6 (05-22-24 @ 02:43), Max: 36.7 (05-21-24 @ 22:19)  HR: 69 (05-22-24 @ 02:43) (69 - 71)  BP: 153/68 (05-22-24 @ 02:43) (143/58 - 153/68)  RR: 20 (05-22-24 @ 02:43) (18 - 20)  SpO2: 98% (05-22-24 @ 02:43) (98% - 100%)    General: Frail  HEENT: non-traumatic, perrla, eomi  Cardio: s1s2 regular rate and rhythm  Lungs: comfortable breathing, clear to auscultation  Abdomen: Soft, diffusely tender, decreased BS  Neuro: AOx4  Ext: Pulses +2

## 2024-05-23 ENCOUNTER — TRANSCRIPTION ENCOUNTER (OUTPATIENT)
Age: 74
End: 2024-05-23

## 2024-05-23 VITALS
OXYGEN SATURATION: 96 % | DIASTOLIC BLOOD PRESSURE: 64 MMHG | HEART RATE: 73 BPM | TEMPERATURE: 98 F | SYSTOLIC BLOOD PRESSURE: 125 MMHG | RESPIRATION RATE: 18 BRPM

## 2024-05-23 LAB
CULTURE RESULTS: SIGNIFICANT CHANGE UP
GLUCOSE BLDC GLUCOMTR-MCNC: 180 MG/DL — HIGH (ref 70–99)
GLUCOSE BLDC GLUCOMTR-MCNC: 201 MG/DL — HIGH (ref 70–99)
SPECIMEN SOURCE: SIGNIFICANT CHANGE UP

## 2024-05-23 PROCEDURE — 99238 HOSP IP/OBS DSCHRG MGMT 30/<: CPT

## 2024-05-23 RX ORDER — SENNA PLUS 8.6 MG/1
2 TABLET ORAL
Qty: 10 | Refills: 0
Start: 2024-05-23 | End: 2024-05-27

## 2024-05-23 RX ORDER — POLYETHYLENE GLYCOL 3350 17 G/17G
17 POWDER, FOR SOLUTION ORAL
Qty: 170 | Refills: 0
Start: 2024-05-23 | End: 2024-06-01

## 2024-05-23 RX ORDER — THIAMINE MONONITRATE (VIT B1) 100 MG
1 TABLET ORAL
Qty: 0 | Refills: 0 | DISCHARGE
Start: 2024-05-23

## 2024-05-23 RX ORDER — FOLIC ACID 0.8 MG
1 TABLET ORAL
Qty: 0 | Refills: 0 | DISCHARGE
Start: 2024-05-23

## 2024-05-23 RX ORDER — CYCLOBENZAPRINE HYDROCHLORIDE 10 MG/1
1 TABLET, FILM COATED ORAL
Qty: 15 | Refills: 0
Start: 2024-05-23 | End: 2024-05-27

## 2024-05-23 RX ORDER — ACETAMINOPHEN 500 MG
2 TABLET ORAL
Qty: 0 | Refills: 0 | DISCHARGE
Start: 2024-05-23

## 2024-05-23 RX ORDER — MINERAL OIL
133 OIL (ML) MISCELLANEOUS ONCE
Refills: 0 | Status: DISCONTINUED | OUTPATIENT
Start: 2024-05-23 | End: 2024-05-23

## 2024-05-23 RX ORDER — OXYCODONE HYDROCHLORIDE 5 MG/1
1 TABLET ORAL
Qty: 28 | Refills: 0
Start: 2024-05-23 | End: 2024-05-29

## 2024-05-23 RX ADMIN — Medication 100 MILLIGRAM(S): at 10:05

## 2024-05-23 RX ADMIN — Medication 650 MILLIGRAM(S): at 17:18

## 2024-05-23 RX ADMIN — Medication 100 MILLIGRAM(S): at 13:47

## 2024-05-23 RX ADMIN — Medication 10 MILLIGRAM(S): at 10:12

## 2024-05-23 RX ADMIN — Medication 100 MILLIGRAM(S): at 06:30

## 2024-05-23 RX ADMIN — Medication 1: at 08:54

## 2024-05-23 RX ADMIN — POLYETHYLENE GLYCOL 3350 17 GRAM(S): 17 POWDER, FOR SOLUTION ORAL at 06:31

## 2024-05-23 RX ADMIN — CLOPIDOGREL BISULFATE 75 MILLIGRAM(S): 75 TABLET, FILM COATED ORAL at 10:05

## 2024-05-23 RX ADMIN — Medication 1 TABLET(S): at 10:05

## 2024-05-23 RX ADMIN — Medication 1 MILLIGRAM(S): at 10:05

## 2024-05-23 RX ADMIN — OXYCODONE HYDROCHLORIDE 5 MILLIGRAM(S): 5 TABLET ORAL at 05:28

## 2024-05-23 RX ADMIN — Medication 81 MILLIGRAM(S): at 10:05

## 2024-05-23 RX ADMIN — PANTOPRAZOLE SODIUM 40 MILLIGRAM(S): 20 TABLET, DELAYED RELEASE ORAL at 06:30

## 2024-05-23 RX ADMIN — Medication 2: at 12:22

## 2024-05-23 NOTE — DISCHARGE NOTE PROVIDER - DETAILS OF MALNUTRITION DIAGNOSIS/DIAGNOSES
This patient has been assessed with a concern for Malnutrition and was treated during this hospitalization for the following Nutrition diagnosis/diagnoses:     -  05/22/2024: Severe protein-calorie malnutrition

## 2024-05-23 NOTE — DISCHARGE NOTE PROVIDER - NSDCCPCAREPLAN_GEN_ALL_CORE_FT
PRINCIPAL DISCHARGE DIAGNOSIS  Diagnosis: Pancreatitis  Assessment and Plan of Treatment: Radiation pancreatitis.  Follow up with GI      SECONDARY DISCHARGE DIAGNOSES  Diagnosis: Intractable back pain  Assessment and Plan of Treatment: Oxycodone    Diagnosis: Pancreatic cancer  Assessment and Plan of Treatment: Follow up with Hem/Onc at [facility]

## 2024-05-23 NOTE — DISCHARGE NOTE PROVIDER - PROVIDER TOKENS
PROVIDER:[TOKEN:[[id number]],FOLLOWUP:[1 week],ESTABLISHEDPATIENT:[T]],PROVIDER:[TOKEN:[[id number]],FOLLOWUP:[1 week],ESTABLISHEDPATIENT:[T]]

## 2024-05-23 NOTE — DISCHARGE NOTE PROVIDER - CARE PROVIDERS DIRECT ADDRESSES
[email or web address]

## 2024-05-23 NOTE — DISCHARGE NOTE PROVIDER - NSDCFUSCHEDAPPT_GEN_ALL_CORE_FT
Fernandez Raya  [facility]  [facility] [facility]  Scheduled Appointment: 06/19/2024     Fernandez Raya  [facility]  [facility] [address]  Scheduled Appointment: 06/19/2024    An Wells  [facility]  [facility] [address]  Scheduled Appointment: 06/25/2024

## 2024-05-23 NOTE — DISCHARGE NOTE PROVIDER - HOSPITAL COURSE
Patient is a 73 y.o. female who presents with abdominal pain and back pain with PMH pancreatic cancer local mets, on radiation therapy, from [facility], CAD, stents, DMII, HLD admitted for radiation induced pancreatitis and intractable back pain.  Treated with IV morphine initially and able to be controlled with oral oxycodone.  Patient also complains of constipation and given a bowel regimen including a mineral enema.  Patient discharged in stable condition and will follow up with Dr. Dorota ODONNELL as an outpatient.     < from: CT Abdomen and Pelvis No Cont (05.22.24 @ 00:22) >    IMPRESSION:  Edema adjacent to the pancreatic head and neck, similar to prior. Most   likely postradiation changes. Superimposed mild acute or recurrent   pancreatitis is possible.        --- End of Report ---    < end of copied text >    Culture - Urine (05.22.24 @ 02:39)   Specimen Source: Clean Catch None  Culture Results:   <10,000 CFU/mL Normal Urogenital Kait Universal Safety Interventions

## 2024-05-23 NOTE — CONSULT NOTE ADULT - ASSESSMENT
73 year old female with history of pancreatic CA s/p RT approximately 8-9 months ago now presenting with ongoing abdomainpa epigastic pain.     Pancreatic adenocarcinoma   - discussed with daughter at bedside  - CT abdominal imaging suggestive of mild epigastic pain   - since here the patient was placed on oxycodoen and abdominal pain has iproved   - will have CT [place] [facility]   - recommend de-escalating diet   - will plan for d/c home today

## 2024-05-23 NOTE — DISCHARGE NOTE PROVIDER - ATTENDING DISCHARGE PHYSICAL EXAMINATION:
Vital Signs Last 24 Hrs  T(C): 36.4 (23 May 2024 15:44), Max: 37 (22 May 2024 21:33)  T(F): 97.5 (23 May 2024 15:44), Max: 98.6 (22 May 2024 21:33)  HR: 73 (23 May 2024 15:44) (66 - 73)  BP: 125/64 (23 May 2024 15:44) (107/56 - 140/58)  BP(mean): 72 (23 May 2024 08:16) (72 - 72)  RR: 18 (23 May 2024 15:44) (17 - 18)  SpO2: 96% (23 May 2024 15:44) (95% - 96%)    Parameters below as of 23 May 2024 15:44  Patient On (Oxygen Delivery Method): room air    PHYSICAL EXAM:    Constitutional: NAD, awake and alert, thin  HEENT:  EOMI, Normal Hearing, MMM  Neck: Soft and supple, No LAD, No JVD  Respiratory: Breath sounds are clear bilaterally, No wheezing, rales or rhonchi  Cardiovascular: S1 and S2, regular rate and rhythm, no Murmurs, gallops or rubs  Gastrointestinal: soft, mildly tender over left side improved, nondistended, no guarding, no rebound  Extremities: No peripheral edema  Vascular: 2+ peripheral pulses  Neurological: A/O x 3, no focal deficits  Musculoskeletal: 5/5 strength b/l upper and lower extremities  Skin: No rashes

## 2024-05-23 NOTE — DISCHARGE NOTE NURSING/CASE MANAGEMENT/SOCIAL WORK - PATIENT PORTAL LINK FT
You can access the FollowMyHealth Patient Portal offered by [facility] by registering at the following website: [email or web address]. By joining [facility]’s FollowMyHealth portal, you will also be able to view your health information using other applications (apps) compatible with our system.

## 2024-05-23 NOTE — DISCHARGE NOTE PROVIDER - CARE PROVIDER_API CALL
Fernandez Raya  Gastroenterology  [address], [place] [address]  Phone: [phone]  Fax: [phone]  Established Patient  Follow Up Time: 1 week    An Wells  [facility]  [address] [place] [address]  Phone: [phone]  Fax: [phone]  Established Patient  Follow Up Time: 1 week

## 2024-05-23 NOTE — DISCHARGE NOTE PROVIDER - NSDCMRMEDTOKEN_GEN_ALL_CORE_FT
alirocumab 75 mg/mL subcutaneous solution: 75 milligram(s) subcutaneous 2 times a week  Aspir 81 oral delayed release tablet: 1 tab(s) orally once a day  Carafate 1 g/10 mL oral suspension: 10 milliliter(s) orally once a day  glipiZIDE 10 mg oral tablet, extended release: 1 tab(s) orally 2 times a day  Lantus Solostar Pen 100 units/mL subcutaneous solution: 10 subcutaneous once a day (at bedtime)  Lipitor 20 mg oral tablet: 1 tab(s) orally once a day  pantoprazole 40 mg oral delayed release tablet: 1 tab(s) orally 2 times a day  Plavix 75 mg oral tablet: 1 tab(s) orally once a day  Vitamin D2 2000 intl units (50 mcg) oral capsule: 1 tab(s) orally once a day   acetaminophen 325 mg oral tablet: 2 tab(s) orally every 6 hours As needed Temp greater or equal to 38C (100.4F), Mild Pain (1 - 3)  alirocumab 75 mg/mL subcutaneous solution: 75 milligram(s) subcutaneous 2 times a week  Aspir 81 oral delayed release tablet: 1 tab(s) orally once a day  Carafate 1 g/10 mL oral suspension: 10 milliliter(s) orally once a day  cyclobenzaprine 5 mg oral tablet: 1 tab(s) orally 3 times a day as needed for Muscle Spasm MDD: 3 tabs  folic acid 1 mg oral tablet: 1 tab(s) orally once a day  glipiZIDE 10 mg oral tablet, extended release: 1 tab(s) orally 2 times a day  Lantus Solostar Pen 100 units/mL subcutaneous solution: 10 subcutaneous once a day (at bedtime)  Lipitor 20 mg oral tablet: 1 tab(s) orally once a day  Multiple Vitamins oral tablet: 1 tab(s) orally once a day  oxyCODONE 5 mg oral tablet: 1 tab(s) orally every 6 hours as needed for Moderate Pain (4 - 6) MDD: 4 tabs  pantoprazole 40 mg oral delayed release tablet: 1 tab(s) orally 2 times a day  Plavix 75 mg oral tablet: 1 tab(s) orally once a day  polyethylene glycol 3350 oral powder for reconstitution: 17 gram(s) orally once a day as needed for Constipation  pregabalin 100 mg oral capsule: 1 cap(s) orally 3 times a day MDD: 3 tabs  senna leaf extract oral tablet: 2 tab(s) orally once a day (at bedtime) as needed for  constipation  thiamine 100 mg oral tablet: 1 tab(s) orally once a day  Vitamin D2 2000 intl units (50 mcg) oral capsule: 1 tab(s) orally once a day

## 2024-05-23 NOTE — CONSULT NOTE ADULT - SUBJECTIVE AND OBJECTIVE BOX
HPI:  73F w/ PMH of pancreatic cancer local mets, [place] [facility], [place], DMII, HLD p/w abdominal pain and back pain. Patient interviewed with daughter at bedside who supplements the history. She's been having abdominal pain chronically, thought to be from gastritis and usually improves with Maalox. She was also referred to GI for EGD by her oncologist. She has also had back pain chronically, recently has a new grandchild at home, and she has been more active. Her abdominal pain and back pain worsened on  pains for a chronically, worsened last Friday night, she [place] [facility] [place] an MRI done which showed diffuse degenerative changes with multi-level disc herniations. 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 She was discharged and presents today for same symptoms. She reports pain is epigastric 8/10, sharp and burning, associated with poor oral intake for 1 day, nausea and 1 ep of vomiting, weight loss of 3-4 lbs over past week. Also reports worsened back pain, mid thoracic, radiated up the spine. Pain is worse with standing, better when hunched over. Denies fevers, chills, rhinitis, sore throat, diarrhea, dysuria. Denies chest pain, SOB. No other complaints, ROS otherwise normal.   In ED vitals stable, CBC/CMP unremarkable. Imaging noted for radiation induced pancreatitis, similar to CT abd 1 week ago. Admitted to  for subacute pancreatitis, and intractable back pain.  (22 May 2024 01:50)      PAST MEDICAL & SURGICAL HISTORY:  Coronary artery disease involving native coronary artery of native heart with unstable angina pectoris  s/p stents x 20      Type 2 diabetes mellitus without complication, without long-term current use of insulin      Hyperlipidemia, unspecified hyperlipidemia type      S/P cardiac cath      H/O lithotripsy          Allergies    IV Contrast (Short breath)    Intolerances        MEDICATIONS  (STANDING):  aspirin enteric coated 81 milliGRAM(s) Oral daily  atorvastatin 20 milliGRAM(s) Oral at bedtime  clopidogrel Tablet 75 milliGRAM(s) Oral daily  dextrose 10% Bolus 125 milliLiter(s) IV Bolus once  dextrose 5%. 1000 milliLiter(s) (50 mL/Hr) IV Continuous <Continuous>  dextrose 5%. 1000 milliLiter(s) (100 mL/Hr) IV Continuous <Continuous>  dextrose 50% Injectable 25 Gram(s) IV Push once  dextrose 50% Injectable 12.5 Gram(s) IV Push once  folic acid 1 milliGRAM(s) Oral daily  glucagon  Injectable 1 milliGRAM(s) IntraMuscular once  heparin   Injectable 5000 Unit(s) SubCutaneous every 12 hours  insulin glargine Injectable (LANTUS) 10 Unit(s) SubCutaneous at bedtime  insulin lispro (ADMELOG) corrective regimen sliding scale   SubCutaneous at bedtime  insulin lispro (ADMELOG) corrective regimen sliding scale   SubCutaneous three times a day before meals  lactated ringers. 1000 milliLiter(s) (100 mL/Hr) IV Continuous <Continuous>  mineral oil enema 133 milliLiter(s) Rectal once  multivitamin 1 Tablet(s) Oral daily  pantoprazole    Tablet 40 milliGRAM(s) Oral two times a day  pregabalin 100 milliGRAM(s) Oral three times a day  senna 2 Tablet(s) Oral at bedtime  thiamine 100 milliGRAM(s) Oral daily    MEDICATIONS  (PRN):  acetaminophen     Tablet .. 650 milliGRAM(s) Oral every 6 hours PRN Temp greater or equal to 38C (100.4F), Mild Pain (1 - 3)  aluminum hydroxide/magnesium hydroxide/simethicone Suspension 30 milliLiter(s) Oral every 4 hours PRN Dyspepsia  bisacodyl Suppository 10 milliGRAM(s) Rectal daily PRN Constipation  cyclobenzaprine 5 milliGRAM(s) Oral three times a day PRN Muscle Spasm  dextrose Oral Gel 15 Gram(s) Oral once PRN Blood Glucose LESS THAN 70 milliGRAM(s)/deciliter  melatonin 3 milliGRAM(s) Oral at bedtime PRN Insomnia  morphine  - Injectable 2 milliGRAM(s) IV Push every 4 hours PRN Severe Pain (7 - 10)  ondansetron Injectable 4 milliGRAM(s) IV Push every 8 hours PRN Nausea and/or Vomiting  oxyCODONE    IR 5 milliGRAM(s) Oral every 6 hours PRN Moderate Pain (4 - 6)  polyethylene glycol 3350 17 Gram(s) Oral daily PRN Constipation      FAMILY HISTORY:      SOCIAL HISTORY: No EtOH, no tobacco    REVIEW OF SYSTEMS:    CONSTITUTIONAL: No weakness, fevers or chills  EYES/ENT: No visual changes;  No vertigo or throat pain   NECK: No pain or stiffness  RESPIRATORY: No cough, wheezing, hemoptysis; No shortness of breath  CARDIOVASCULAR: No chest pain or palpitations  GASTROINTESTINAL: No abdominal or epigastric pain. No nausea, vomiting, or hematemesis; No diarrhea or constipation. No melena or hematochezia.  GENITOURINARY: No dysuria, frequency or hematuria  NEUROLOGICAL: No numbness or weakness  SKIN: No itching, burning, rashes, or lesions   All other review of systems is negative unless indicated above.    Height (cm): 162.6 (05-24 @ 01:22)  Weight (kg): 53.9 (05-24 @ 01:23)  BMI (kg/m2): 20.4 (05-24 @ 01:23)  BSA (m2): 1.57 (05-24 @ 01:23)    T(F): 99.1 (05-24-24 @ 01:23), Max: 99.1 (05-24-24 @ 01:23)  HR: 75 (05-24-24 @ 01:23)  BP: 150/72 (05-24-24 @ 01:23)  RR: 18 (05-24-24 @ 01:23)  SpO2: 98% (05-24-24 @ 01:23)  Wt(kg): --    GENERAL: NAD, well-developed  HEAD:  Atraumatic, Normocephalic  EYES: EOMI, PERRLA, conjunctiva and sclera clear  NECK: Supple, No JVD  CHEST/LUNG: Clear to auscultation bilaterally; No wheeze  HEART: Regular rate and rhythm; No murmurs, rubs, or gallops  ABDOMEN: Soft, Nontender, Nondistended; Bowel sounds present  EXTREMITIES:  2+ Peripheral Pulses, No clubbing, cyanosis, or edema  NEUROLOGY: non-focal  SKIN: No rashes or lesions                          11.4   4.17  )-----------( 256      ( 22 May 2024 06:07 )             34.4       05-22    141  |  111<H>  |  14  ----------------------------<  121<H>  4.1   |  27  |  0.68    Ca    8.9      22 May 2024 06:07  Phos  3.4     05-22  Mg     2.4     05-22    TPro  6.1  /  Alb  3.1<L>  /  TBili  0.9  /  DBili  0.3  /  AST  9<L>  /  ALT  19  /  AlkPhos  93  05-22              Clean Catch None  05-22 @ 02:39   <10,000 CFU/mL Normal Urogenital Kait  --  --      Clean Catch None  05-18 @ 22:43   <10,000 CFU/mL Normal Urogenital Kait  --  --

## 2024-05-24 ENCOUNTER — INPATIENT (INPATIENT)
Facility: HOSPITAL | Age: 74
LOS: 11 days | Discharge: HOME | DRG: 440 | End: 2024-06-05
Attending: FAMILY MEDICINE | Admitting: INTERNAL MEDICINE
Payer: MEDICARE

## 2024-05-24 VITALS — WEIGHT: 154.98 LBS | HEIGHT: 64 IN

## 2024-05-24 DIAGNOSIS — Z98.890 OTHER SPECIFIED POSTPROCEDURAL STATES: Chronic | ICD-10-CM

## 2024-05-24 DIAGNOSIS — K85.90 ACUTE PANCREATITIS WITHOUT NECROSIS OR INFECTION, UNSPECIFIED: ICD-10-CM

## 2024-05-24 LAB
ADD ON TEST-SPECIMEN IN LAB: SIGNIFICANT CHANGE UP
ALBUMIN SERPL ELPH-MCNC: 3.3 G/DL — SIGNIFICANT CHANGE UP (ref 3.3–5)
ALP SERPL-CCNC: 95 U/L — SIGNIFICANT CHANGE UP (ref 40–120)
ALT FLD-CCNC: 18 U/L — SIGNIFICANT CHANGE UP (ref 12–78)
ANION GAP SERPL CALC-SCNC: 3 MMOL/L — LOW (ref 5–17)
APTT BLD: 28.8 SEC — SIGNIFICANT CHANGE UP (ref 24.5–35.6)
AST SERPL-CCNC: 20 U/L — SIGNIFICANT CHANGE UP (ref 15–37)
BASOPHILS # BLD AUTO: 0.01 K/UL — SIGNIFICANT CHANGE UP (ref 0–0.2)
BASOPHILS NFR BLD AUTO: 0.2 % — SIGNIFICANT CHANGE UP (ref 0–2)
BILIRUB SERPL-MCNC: 0.9 MG/DL — SIGNIFICANT CHANGE UP (ref 0.2–1.2)
BLD GP AB SCN SERPL QL: SIGNIFICANT CHANGE UP
BUN SERPL-MCNC: 15 MG/DL — SIGNIFICANT CHANGE UP (ref 7–23)
CALCIUM SERPL-MCNC: 9.6 MG/DL — SIGNIFICANT CHANGE UP (ref 8.5–10.1)
CHLORIDE SERPL-SCNC: 107 MMOL/L — SIGNIFICANT CHANGE UP (ref 96–108)
CO2 SERPL-SCNC: 28 MMOL/L — SIGNIFICANT CHANGE UP (ref 22–31)
CREAT SERPL-MCNC: 0.79 MG/DL — SIGNIFICANT CHANGE UP (ref 0.5–1.3)
EGFR: 79 ML/MIN/1.73M2 — SIGNIFICANT CHANGE UP
EOSINOPHIL # BLD AUTO: 0.06 K/UL — SIGNIFICANT CHANGE UP (ref 0–0.5)
EOSINOPHIL NFR BLD AUTO: 1.1 % — SIGNIFICANT CHANGE UP (ref 0–6)
GLUCOSE BLDC GLUCOMTR-MCNC: 129 MG/DL — HIGH (ref 70–99)
GLUCOSE BLDC GLUCOMTR-MCNC: 160 MG/DL — HIGH (ref 70–99)
GLUCOSE SERPL-MCNC: 186 MG/DL — HIGH (ref 70–99)
HCT VFR BLD CALC: 38.9 % — SIGNIFICANT CHANGE UP (ref 34.5–45)
HGB BLD-MCNC: 12.7 G/DL — SIGNIFICANT CHANGE UP (ref 11.5–15.5)
IMM GRANULOCYTES NFR BLD AUTO: 0.2 % — SIGNIFICANT CHANGE UP (ref 0–0.9)
INR BLD: 0.98 RATIO — SIGNIFICANT CHANGE UP (ref 0.85–1.18)
LIDOCAIN IGE QN: 9 U/L — LOW (ref 13–75)
LYMPHOCYTES # BLD AUTO: 1.38 K/UL — SIGNIFICANT CHANGE UP (ref 1–3.3)
LYMPHOCYTES # BLD AUTO: 25.1 % — SIGNIFICANT CHANGE UP (ref 13–44)
MCHC RBC-ENTMCNC: 29 PG — SIGNIFICANT CHANGE UP (ref 27–34)
MCHC RBC-ENTMCNC: 32.6 GM/DL — SIGNIFICANT CHANGE UP (ref 32–36)
MCV RBC AUTO: 88.8 FL — SIGNIFICANT CHANGE UP (ref 80–100)
MONOCYTES # BLD AUTO: 0.39 K/UL — SIGNIFICANT CHANGE UP (ref 0–0.9)
MONOCYTES NFR BLD AUTO: 7.1 % — SIGNIFICANT CHANGE UP (ref 2–14)
NEUTROPHILS # BLD AUTO: 3.64 K/UL — SIGNIFICANT CHANGE UP (ref 1.8–7.4)
NEUTROPHILS NFR BLD AUTO: 66.3 % — SIGNIFICANT CHANGE UP (ref 43–77)
PLATELET # BLD AUTO: 274 K/UL — SIGNIFICANT CHANGE UP (ref 150–400)
POTASSIUM SERPL-MCNC: 4 MMOL/L — SIGNIFICANT CHANGE UP (ref 3.5–5.3)
POTASSIUM SERPL-SCNC: 4 MMOL/L — SIGNIFICANT CHANGE UP (ref 3.5–5.3)
PROT SERPL-MCNC: 7.1 GM/DL — SIGNIFICANT CHANGE UP (ref 6–8.3)
PROTHROM AB SERPL-ACNC: 11.1 SEC — SIGNIFICANT CHANGE UP (ref 9.5–13)
RBC # BLD: 4.38 M/UL — SIGNIFICANT CHANGE UP (ref 3.8–5.2)
RBC # FLD: 13 % — SIGNIFICANT CHANGE UP (ref 10.3–14.5)
SODIUM SERPL-SCNC: 138 MMOL/L — SIGNIFICANT CHANGE UP (ref 135–145)
WBC # BLD: 5.49 K/UL — SIGNIFICANT CHANGE UP (ref 3.8–10.5)
WBC # FLD AUTO: 5.49 K/UL — SIGNIFICANT CHANGE UP (ref 3.8–10.5)

## 2024-05-24 PROCEDURE — 74183 MRI ABD W/O CNTR FLWD CNTR: CPT | Mod: MC

## 2024-05-24 PROCEDURE — 99223 1ST HOSP IP/OBS HIGH 75: CPT

## 2024-05-24 PROCEDURE — A9579: CPT

## 2024-05-24 PROCEDURE — 74018 RADEX ABDOMEN 1 VIEW: CPT

## 2024-05-24 PROCEDURE — 76000 FLUOROSCOPY <1 HR PHYS/QHP: CPT

## 2024-05-24 PROCEDURE — 93010 ELECTROCARDIOGRAM REPORT: CPT

## 2024-05-24 PROCEDURE — 93005 ELECTROCARDIOGRAM TRACING: CPT

## 2024-05-24 PROCEDURE — C2617: CPT

## 2024-05-24 PROCEDURE — C1889: CPT

## 2024-05-24 PROCEDURE — 97164 PT RE-EVAL EST PLAN CARE: CPT | Mod: GP

## 2024-05-24 PROCEDURE — 80048 BASIC METABOLIC PNL TOTAL CA: CPT

## 2024-05-24 PROCEDURE — 97163 PT EVAL HIGH COMPLEX 45 MIN: CPT | Mod: GP

## 2024-05-24 PROCEDURE — 83735 ASSAY OF MAGNESIUM: CPT

## 2024-05-24 PROCEDURE — C9113: CPT

## 2024-05-24 PROCEDURE — 83690 ASSAY OF LIPASE: CPT

## 2024-05-24 PROCEDURE — 82962 GLUCOSE BLOOD TEST: CPT

## 2024-05-24 PROCEDURE — 97530 THERAPEUTIC ACTIVITIES: CPT | Mod: GP

## 2024-05-24 PROCEDURE — 36415 COLL VENOUS BLD VENIPUNCTURE: CPT

## 2024-05-24 PROCEDURE — 99285 EMERGENCY DEPT VISIT HI MDM: CPT

## 2024-05-24 PROCEDURE — 74183 MRI ABD W/O CNTR FLWD CNTR: CPT | Mod: 26

## 2024-05-24 PROCEDURE — 97116 GAIT TRAINING THERAPY: CPT | Mod: GP

## 2024-05-24 PROCEDURE — 85027 COMPLETE CBC AUTOMATED: CPT

## 2024-05-24 PROCEDURE — 80053 COMPREHEN METABOLIC PANEL: CPT

## 2024-05-24 PROCEDURE — 99222 1ST HOSP IP/OBS MODERATE 55: CPT

## 2024-05-24 RX ORDER — SODIUM CHLORIDE 9 MG/ML
1000 INJECTION INTRAMUSCULAR; INTRAVENOUS; SUBCUTANEOUS
Refills: 0 | Status: DISCONTINUED | OUTPATIENT
Start: 2024-05-24 | End: 2024-06-05

## 2024-05-24 RX ORDER — INSULIN LISPRO 100/ML
VIAL (ML) SUBCUTANEOUS
Refills: 0 | Status: DISCONTINUED | OUTPATIENT
Start: 2024-05-24 | End: 2024-06-05

## 2024-05-24 RX ORDER — POLYETHYLENE GLYCOL 3350 17 G/17G
17 POWDER, FOR SOLUTION ORAL DAILY
Refills: 0 | Status: DISCONTINUED | OUTPATIENT
Start: 2024-05-24 | End: 2024-05-25

## 2024-05-24 RX ORDER — DEXTROSE 50 % IN WATER 50 %
15 SYRINGE (ML) INTRAVENOUS ONCE
Refills: 0 | Status: DISCONTINUED | OUTPATIENT
Start: 2024-05-24 | End: 2024-06-05

## 2024-05-24 RX ORDER — DEXTROSE 50 % IN WATER 50 %
25 SYRINGE (ML) INTRAVENOUS ONCE
Refills: 0 | Status: DISCONTINUED | OUTPATIENT
Start: 2024-05-24 | End: 2024-06-05

## 2024-05-24 RX ORDER — ALIROCUMAB 75 MG/ML
75 INJECTION, SOLUTION SUBCUTANEOUS
Qty: 0 | Refills: 0 | DISCHARGE

## 2024-05-24 RX ORDER — CYCLOBENZAPRINE HYDROCHLORIDE 10 MG/1
5 TABLET, FILM COATED ORAL THREE TIMES A DAY
Refills: 0 | Status: DISCONTINUED | OUTPATIENT
Start: 2024-05-24 | End: 2024-05-26

## 2024-05-24 RX ORDER — CLOPIDOGREL BISULFATE 75 MG/1
75 TABLET, FILM COATED ORAL DAILY
Refills: 0 | Status: DISCONTINUED | OUTPATIENT
Start: 2024-05-24 | End: 2024-06-02

## 2024-05-24 RX ORDER — PANTOPRAZOLE SODIUM 20 MG/1
1 TABLET, DELAYED RELEASE ORAL
Refills: 0 | DISCHARGE

## 2024-05-24 RX ORDER — HYDROMORPHONE HYDROCHLORIDE 2 MG/ML
0.5 INJECTION INTRAMUSCULAR; INTRAVENOUS; SUBCUTANEOUS EVERY 4 HOURS
Refills: 0 | Status: DISCONTINUED | OUTPATIENT
Start: 2024-05-24 | End: 2024-05-25

## 2024-05-24 RX ORDER — ASPIRIN/CALCIUM CARB/MAGNESIUM 324 MG
1 TABLET ORAL
Qty: 0 | Refills: 0 | DISCHARGE

## 2024-05-24 RX ORDER — ASPIRIN/CALCIUM CARB/MAGNESIUM 324 MG
81 TABLET ORAL DAILY
Refills: 0 | Status: DISCONTINUED | OUTPATIENT
Start: 2024-05-24 | End: 2024-06-05

## 2024-05-24 RX ORDER — SUCRALFATE 1 G
1 TABLET ORAL
Refills: 0 | Status: DISCONTINUED | OUTPATIENT
Start: 2024-05-24 | End: 2024-06-05

## 2024-05-24 RX ORDER — LANOLIN ALCOHOL/MO/W.PET/CERES
3 CREAM (GRAM) TOPICAL AT BEDTIME
Refills: 0 | Status: DISCONTINUED | OUTPATIENT
Start: 2024-05-24 | End: 2024-06-05

## 2024-05-24 RX ORDER — GLUCAGON INJECTION, SOLUTION 0.5 MG/.1ML
1 INJECTION, SOLUTION SUBCUTANEOUS ONCE
Refills: 0 | Status: DISCONTINUED | OUTPATIENT
Start: 2024-05-24 | End: 2024-06-05

## 2024-05-24 RX ORDER — SODIUM CHLORIDE 9 MG/ML
1000 INJECTION, SOLUTION INTRAVENOUS
Refills: 0 | Status: DISCONTINUED | OUTPATIENT
Start: 2024-05-24 | End: 2024-06-05

## 2024-05-24 RX ORDER — ENOXAPARIN SODIUM 100 MG/ML
40 INJECTION SUBCUTANEOUS EVERY 24 HOURS
Refills: 0 | Status: DISCONTINUED | OUTPATIENT
Start: 2024-05-24 | End: 2024-06-02

## 2024-05-24 RX ORDER — HYDROMORPHONE HYDROCHLORIDE 2 MG/ML
1 INJECTION INTRAMUSCULAR; INTRAVENOUS; SUBCUTANEOUS EVERY 4 HOURS
Refills: 0 | Status: DISCONTINUED | OUTPATIENT
Start: 2024-05-24 | End: 2024-05-24

## 2024-05-24 RX ORDER — ONDANSETRON 8 MG/1
4 TABLET, FILM COATED ORAL EVERY 8 HOURS
Refills: 0 | Status: DISCONTINUED | OUTPATIENT
Start: 2024-05-24 | End: 2024-06-05

## 2024-05-24 RX ORDER — SODIUM CHLORIDE 9 MG/ML
1000 INJECTION, SOLUTION INTRAVENOUS ONCE
Refills: 0 | Status: COMPLETED | OUTPATIENT
Start: 2024-05-24 | End: 2024-05-24

## 2024-05-24 RX ORDER — ACETAMINOPHEN 500 MG
650 TABLET ORAL EVERY 6 HOURS
Refills: 0 | Status: DISCONTINUED | OUTPATIENT
Start: 2024-05-24 | End: 2024-06-05

## 2024-05-24 RX ORDER — ATORVASTATIN CALCIUM 80 MG/1
1 TABLET, FILM COATED ORAL
Qty: 0 | Refills: 0 | DISCHARGE

## 2024-05-24 RX ORDER — ERGOCALCIFEROL 1.25 MG/1
1 CAPSULE ORAL
Qty: 0 | Refills: 0 | DISCHARGE

## 2024-05-24 RX ORDER — CLOPIDOGREL BISULFATE 75 MG/1
1 TABLET, FILM COATED ORAL
Qty: 0 | Refills: 0 | DISCHARGE

## 2024-05-24 RX ORDER — INSULIN GLARGINE 100 [IU]/ML
10 INJECTION, SOLUTION SUBCUTANEOUS
Refills: 0 | DISCHARGE

## 2024-05-24 RX ORDER — PANTOPRAZOLE SODIUM 20 MG/1
8 TABLET, DELAYED RELEASE ORAL
Qty: 80 | Refills: 0 | Status: DISCONTINUED | OUTPATIENT
Start: 2024-05-24 | End: 2024-05-25

## 2024-05-24 RX ORDER — INSULIN GLARGINE 100 [IU]/ML
5 INJECTION, SOLUTION SUBCUTANEOUS AT BEDTIME
Refills: 0 | Status: DISCONTINUED | OUTPATIENT
Start: 2024-05-24 | End: 2024-06-05

## 2024-05-24 RX ORDER — DEXTROSE 50 % IN WATER 50 %
12.5 SYRINGE (ML) INTRAVENOUS ONCE
Refills: 0 | Status: DISCONTINUED | OUTPATIENT
Start: 2024-05-24 | End: 2024-06-05

## 2024-05-24 RX ORDER — DEXTROSE 10 % IN WATER 10 %
125 INTRAVENOUS SOLUTION INTRAVENOUS ONCE
Refills: 0 | Status: DISCONTINUED | OUTPATIENT
Start: 2024-05-24 | End: 2024-06-05

## 2024-05-24 RX ORDER — HYDROMORPHONE HYDROCHLORIDE 2 MG/ML
2 INJECTION INTRAMUSCULAR; INTRAVENOUS; SUBCUTANEOUS
Refills: 0 | Status: DISCONTINUED | OUTPATIENT
Start: 2024-05-24 | End: 2024-05-27

## 2024-05-24 RX ORDER — HYDROMORPHONE HYDROCHLORIDE 2 MG/ML
0.5 INJECTION INTRAMUSCULAR; INTRAVENOUS; SUBCUTANEOUS ONCE
Refills: 0 | Status: DISCONTINUED | OUTPATIENT
Start: 2024-05-24 | End: 2024-05-24

## 2024-05-24 RX ADMIN — SODIUM CHLORIDE 125 MILLILITER(S): 9 INJECTION INTRAMUSCULAR; INTRAVENOUS; SUBCUTANEOUS at 14:43

## 2024-05-24 RX ADMIN — POLYETHYLENE GLYCOL 3350 17 GRAM(S): 17 POWDER, FOR SOLUTION ORAL at 13:06

## 2024-05-24 RX ADMIN — HYDROMORPHONE HYDROCHLORIDE 0.5 MILLIGRAM(S): 2 INJECTION INTRAMUSCULAR; INTRAVENOUS; SUBCUTANEOUS at 02:46

## 2024-05-24 RX ADMIN — CLOPIDOGREL BISULFATE 75 MILLIGRAM(S): 75 TABLET, FILM COATED ORAL at 15:22

## 2024-05-24 RX ADMIN — Medication 81 MILLIGRAM(S): at 20:49

## 2024-05-24 RX ADMIN — ONDANSETRON 4 MILLIGRAM(S): 8 TABLET, FILM COATED ORAL at 15:34

## 2024-05-24 RX ADMIN — HYDROMORPHONE HYDROCHLORIDE 2 MILLIGRAM(S): 2 INJECTION INTRAMUSCULAR; INTRAVENOUS; SUBCUTANEOUS at 22:24

## 2024-05-24 RX ADMIN — PANTOPRAZOLE SODIUM 10 MG/HR: 20 TABLET, DELAYED RELEASE ORAL at 14:44

## 2024-05-24 RX ADMIN — Medication 100 MILLIGRAM(S): at 20:49

## 2024-05-24 RX ADMIN — INSULIN GLARGINE 5 UNIT(S): 100 INJECTION, SOLUTION SUBCUTANEOUS at 20:49

## 2024-05-24 RX ADMIN — SODIUM CHLORIDE 125 MILLILITER(S): 9 INJECTION INTRAMUSCULAR; INTRAVENOUS; SUBCUTANEOUS at 22:15

## 2024-05-24 RX ADMIN — HYDROMORPHONE HYDROCHLORIDE 2 MILLIGRAM(S): 2 INJECTION INTRAMUSCULAR; INTRAVENOUS; SUBCUTANEOUS at 22:59

## 2024-05-24 RX ADMIN — HYDROMORPHONE HYDROCHLORIDE 2 MILLIGRAM(S): 2 INJECTION INTRAMUSCULAR; INTRAVENOUS; SUBCUTANEOUS at 17:44

## 2024-05-24 RX ADMIN — HYDROMORPHONE HYDROCHLORIDE 1 MILLIGRAM(S): 2 INJECTION INTRAMUSCULAR; INTRAVENOUS; SUBCUTANEOUS at 13:31

## 2024-05-24 RX ADMIN — HYDROMORPHONE HYDROCHLORIDE 0.5 MILLIGRAM(S): 2 INJECTION INTRAMUSCULAR; INTRAVENOUS; SUBCUTANEOUS at 20:25

## 2024-05-24 RX ADMIN — SODIUM CHLORIDE 125 MILLILITER(S): 9 INJECTION INTRAMUSCULAR; INTRAVENOUS; SUBCUTANEOUS at 13:06

## 2024-05-24 RX ADMIN — HYDROMORPHONE HYDROCHLORIDE 0.5 MILLIGRAM(S): 2 INJECTION INTRAMUSCULAR; INTRAVENOUS; SUBCUTANEOUS at 20:08

## 2024-05-24 RX ADMIN — Medication 100 MILLIGRAM(S): at 15:22

## 2024-05-24 RX ADMIN — Medication 1 GRAM(S): at 20:58

## 2024-05-24 RX ADMIN — PANTOPRAZOLE SODIUM 10 MG/HR: 20 TABLET, DELAYED RELEASE ORAL at 22:16

## 2024-05-24 RX ADMIN — SODIUM CHLORIDE 2000 MILLILITER(S): 9 INJECTION, SOLUTION INTRAVENOUS at 02:47

## 2024-05-24 NOTE — H&P ADULT - NSHPPHYSICALEXAM_GEN_ALL_CORE
Vital Signs Last 24 Hrs  T(C): 36.8 (24 May 2024 10:00), Max: 37.3 (24 May 2024 01:23)  T(F): 98.3 (24 May 2024 10:00), Max: 99.1 (24 May 2024 01:23)  HR: 62 (24 May 2024 10:00) (62 - 75)  BP: 118/61 (24 May 2024 10:00) (118/61 - 160/60)  BP(mean): 76 (24 May 2024 10:00) (67 - 93)  RR: 18 (24 May 2024 10:00) (16 - 18)  SpO2: 96% (24 May 2024 10:00) (95% - 98%)    Parameters below as of 24 May 2024 10:00  Patient On (Oxygen Delivery Method): room air    PHYSICAL EXAM:      Constitutional: NAD  Eyes: perrl, no conjunctival changes  ENMT: no exudates, moist oral muc, uvula midline  Neck: no JVD, no LAD  Back: no cva tenderness  Respiratory: CTA, no exp wheezes  Cardiovascular: S1S2 reg, no murmur gallop or rub  Gastrointestinal: abd soft, NT/ND + BS  Genitourinary: voiding  Extremities: FROM, no joint effusions, no edema, no clubbing , no cyanosis  Vascular: pedal pulses + bilateral, warm extremities  Neurological: non focal, mot str 5/5/ all extr  Skin: no rashes  Lymph Nodes: no LAD

## 2024-05-24 NOTE — H&P ADULT - ASSESSMENT
* Recurrent abd pain suspect recurrent pancreatic cancer  GI consult  trial of NPO  Dilaudid IV and PO  further scans to be ordered by GI  check 19-9  IVF    * T2DM  COLEMAN to be reduced based on PO intake and PO status    * CAD stents  DAP       * Recurrent abd pain suspect recurrent pancreatic cancer  GI consult  MRCP will beed biopsy most likely  Dilaudid IV and PO  check 19-9  IVF    * T2DM  COLEMAN to be reduced based on PO intake and PO status- only 5u ordered and hold if BGM<140 not sure how much will she eat    * CAD stents  DAP

## 2024-05-24 NOTE — ED PROVIDER NOTE - PROGRESS NOTE DETAILS
patient's pain was able to be controlled with IV Dilaudid resting comfortably now in a shared decision-making format with patient's daughter plan of care currently is to watch patient throughout the morning see if her pain recurs then will likely need readmission to the hospital for intractable abdominal pain she was just discharged from the hospital we will have heme-onc see again is there a role for steroids in this case labs normal no guarding no fever will require stay in the ED throughout the morning to best evaluate for her ultimate disposition patient's daughter agrees with this plan of care patient reevaluated at bedside.  States pain has improved but has not gone away.  Heme-onc Dr. Almonte recommended patient to be admitted for pain control will admit patient to medicine.  Dr. Corcoran on call admitting hospitalist aware. Rodney Jay DO Attending Physician: Patient received in signout from Dr. German.    73-year-old female with history of pancreatic cancer here for radiation induced pancreatitis.  Pending heme-onc consult and reeval after pain meds.

## 2024-05-24 NOTE — CONSULT NOTE ADULT - SUBJECTIVE AND OBJECTIVE BOX
Patient is a 73y old female who presents with a chief complaint of abdominal pain    HPI:  This is a 73 year old female with history pancreatic cancer s/p XRT 2022 recently hospitalized and discharged for possible radiation induced pancreatitis presents to [facility] with recurrence abdominal pain refractory to pain medication. Patient evaluated at bedside with daughter this afternoon. Patient endorsing pain localized initially to epigastric region "squeezing" and radiating into back but now with lower abdominal bloating which she feels is similar to past episodes of "gastritis." Denies any side effects from radiation treatments. Currently endorses dizziness with changing positions, intermittent nausea, and one episode "spitting up" with nausea but denies any hematemesis, melena , or hematochezia. Patient does have issues with constipation stating small brown stool yesterday after several days without stooling. CT imaging with possible mild pancreatitis, no evidence of biliary or pancreatic ductal dilatation although pancreatic duct prominent on CT imaging with contrast. Lipase within normal limits.    PAST MEDICAL & SURGICAL HISTORY:  Coronary artery disease involving native coronary artery of native heart with unstable angina pectoris  s/p stents x 20      Type 2 diabetes mellitus without complication, without long-term current use of insulin      Hyperlipidemia, unspecified hyperlipidemia type      S/P cardiac cath      H/O lithotripsy      MEDICATIONS  (STANDING):  aspirin enteric coated 81 milliGRAM(s) Oral daily  clopidogrel Tablet 75 milliGRAM(s) Oral daily  dextrose 10% Bolus 125 milliLiter(s) IV Bolus once  dextrose 5%. 1000 milliLiter(s) (100 mL/Hr) IV Continuous <Continuous>  dextrose 5%. 1000 milliLiter(s) (50 mL/Hr) IV Continuous <Continuous>  dextrose 50% Injectable 12.5 Gram(s) IV Push once  dextrose 50% Injectable 25 Gram(s) IV Push once  enoxaparin Injectable 40 milliGRAM(s) SubCutaneous every 24 hours  glucagon  Injectable 1 milliGRAM(s) IntraMuscular once  insulin glargine Injectable (LANTUS) 5 Unit(s) SubCutaneous at bedtime  insulin lispro (ADMELOG) corrective regimen sliding scale   SubCutaneous three times a day before meals  pantoprazole Infusion 8 mG/Hr (10 mL/Hr) IV Continuous <Continuous>  polyethylene glycol 3350 17 Gram(s) Oral daily  pregabalin 100 milliGRAM(s) Oral three times a day  sodium chloride 0.9%. 1000 milliLiter(s) (125 mL/Hr) IV Continuous <Continuous>  sucralfate suspension 1 Gram(s) Oral two times a day    MEDICATIONS  (PRN):  acetaminophen     Tablet .. 650 milliGRAM(s) Oral every 6 hours PRN Temp greater or equal to 38C (100.4F), Mild Pain (1 - 3)  aluminum hydroxide/magnesium hydroxide/simethicone Suspension 30 milliLiter(s) Oral every 4 hours PRN Dyspepsia  cyclobenzaprine 5 milliGRAM(s) Oral three times a day PRN Muscle Spasm  dextrose Oral Gel 15 Gram(s) Oral once PRN Blood Glucose LESS THAN 70 milliGRAM(s)/deciliter  HYDROmorphone   Tablet 2 milliGRAM(s) Oral every 3 hours PRN Moderate Pain (4 - 6)  HYDROmorphone  Injectable 0.5 milliGRAM(s) IV Push every 4 hours PRN Severe Pain (7 - 10)  melatonin 3 milliGRAM(s) Oral at bedtime PRN Insomnia  ondansetron Injectable 4 milliGRAM(s) IV Push every 8 hours PRN Nausea and/or Vomiting    Allergies    IV Contrast (Short breath)    Intolerances    SOCIAL HISTORY:    FAMILY HISTORY:    REVIEW OF SYSTEMS:    CONSTITUTIONAL: No weakness, fevers or chills  EYES/ENT: No visual changes;  No vertigo or throat pain   NECK: No pain or stiffness  RESPIRATORY: No cough, wheezing, hemoptysis; No shortness of breath  CARDIOVASCULAR: No chest pain or palpitations  GASTROINTESTINAL: See HPI  GENITOURINARY: No dysuria, frequency or hematuria  NEUROLOGICAL: No numbness or weakness  SKIN: No itching, burning, rashes, or lesions   PSYCH: Normal mood and affect  All other review of systems is negative unless indicated above.    Vital Signs Last 24 Hrs  T(C): 36.6 (24 May 2024 13:46), Max: 37.3 (24 May 2024 01:23)  T(F): 97.9 (24 May 2024 13:46), Max: 99.1 (24 May 2024 01:23)  HR: 69 (24 May 2024 13:46) (62 - 75)  BP: 131/64 (24 May 2024 13:46) (118/61 - 160/60)  BP(mean): 83 (24 May 2024 13:46) (67 - 93)  RR: 14 (24 May 2024 13:46) (14 - 18)  SpO2: 96% (24 May 2024 13:46) (95% - 98%)    Parameters below as of 24 May 2024 13:46  Patient On (Oxygen Delivery Method): room air    PHYSICAL EXAM:    Constitutional: No acute distress, non-toxic appearing  HEENT: masked, good phonation, not icteric  Neck: supple, no lymphadenopathy  Respiratory: clear to ascultation bilaterally, no wheezing  Cardiovascular: S1 and S2, regular rate and rhythm, no murmurs rubs or gallops  Gastrointestinal: soft, diffusely TTP, softly distended, +bowel sounds, no rebound or guarding, no surgical scars, no drains  Extremities: No peripheral edema, no cyanosis or clubbing  Vascular: 2+ peripheral pulses, no venous stasis  Neurological: A/O x 3, no focal deficits, no asterixis  Psychiatric: Normal mood, normal affect  Skin: No rashes, not jaundiced    LABS:                        12.7   5.49  )-----------( 274      ( 24 May 2024 02:01 )             38.9     05-24    138  |  107  |  15  ----------------------------<  186<H>  4.0   |  28  |  0.79    Ca    9.6      24 May 2024 02:01    TPro  7.1  /  Alb  3.3  /  TBili  0.9  /  DBili  x   /  AST  20  /  ALT  18  /  AlkPhos  95  05-24    PT/INR - ( 24 May 2024 02:01 )   PT: 11.1 sec;   INR: 0.98 ratio         PTT - ( 24 May 2024 02:01 )  PTT:28.8 sec  LIVER FUNCTIONS - ( 24 May 2024 02:01 )  Alb: 3.3 g/dL / Pro: 7.1 gm/dL / ALK PHOS: 95 U/L / ALT: 18 U/L / AST: 20 U/L / GGT: x             RADIOLOGY & ADDITIONAL STUDIES:  FINDINGS:  LOWER CHEST: Coronary artery calcifications.    LIVER: Within normal limits.  BILE DUCTS: Normal caliber.  GALLBLADDER: Within normal limits.  SPLEEN: Within normal limits.  PANCREAS: Mild prominence of the pancreatic duct in the tail of the   pancreas without definite pancreatic mass is seen. There is a small   metallic density at the body of pancreas possibly radiation fiducial.   Mild haziness of the fat in the region of the pancreatic body and head   possibly from radiation changes. The superior mesenteric artery is patent   however it appears to have irregular walls which may also be sequela of   radiation treatment.  ADRENALS: Within normal limits.  KIDNEYS/URETERS: Renal cysts and hypodensities too small to characterize.   Tiny bilateral nonobstructing renal calcifications.    BLADDER: The urinary bladder is mildly distended. There is a tiny droplet   of air in the bladder question recent instrumentation.  REPRODUCTIVE ORGANS: Uterus and adnexa within normal limits.    BOWEL: No bowel obstruction. Appendix is normal.  PERITONEUM: No ascites.  VESSELS: Atherosclerotic changes.  RETROPERITONEUM/LYMPH NODES: No lymphadenopathy.  ABDOMINAL WALL: Within normal limits.  BONES: Degenerative changes.    IMPRESSION:    Findings in the pancreas as described above suggesting postradiation   treatment. Correlate with the patient's history. Correlate clinically for   pancreatitis.    The urinary bladder is mildly distended. There is a tiny droplet of air   in the bladder question recent instrumentation.    FINDINGS:  LOWER CHEST: Within normal limits.    LIVER: Within normal limits.  BILE DUCTS: Normal caliber.  GALLBLADDER: Within normal limits.  SPLEEN: Within normal limits.  PANCREAS: Metallic structure pancreatic body, edema adjacent to the   pancreatic head and neck and extending into the superior mesenteric fat   similar to prior. The pancreatic ductal dilation in the pancreatic tail   is less well seen on this noncontrast examination. No mass is evident.  ADRENALS: Within normal limits.  KIDNEYS/URETERS: No hydronephrosis. 1 right and 4 left small   nonobstructing renal stones. 3 cm cyst upper pole right kidney.    BLADDER: Within normal limits.  REPRODUCTIVE ORGANS: Uterus and adnexa within normal limits.    BOWEL: No bowel obstruction. Appendix is normal. Scattered left colonic   diverticula, no diverticulitis.  PERITONEUM: No ascites.  VESSELS: Atherosclerotic changes.  RETROPERITONEUM/LYMPH NODES: No lymphadenopathy.  ABDOMINAL WALL: Within normal limits.  BONES: Degenerative changes most prominent L3-4 similar to prior. No   fracture or aggressive osseous lesions.    IMPRESSION:  Edema adjacent to the pancreatic head and neck, similar to prior. Most   likely postradiation changes. Superimposed mild acute or recurrent   pancreatitis is possible.   Patient is a 73y old female who presents with a chief complaint of abdominal pain    73 year old woman with history pancreatic cancer s/p XRT 2022 recently hospitalized for pancreatitis, admitted with abdominal pain, pancreatitis.     Patient evaluated at bedside with daughter this afternoon. Patient endorsing pain localized initially to epigastric region "squeezing" and radiating into back but now with lower abdominal bloating which she feels is similar to past episodes of "gastritis." Pain is 6/10, constant, ongoing for the last few days, without known alleviating or exacerbating factors. No fevers or chills. Nausea but no vomiting. Currently endorses dizziness with changing positions, intermittent nausea, and one episode "spitting up" with nausea but denies any hematemesis, melena , or hematochezia. Patient does have issues with constipation stating small brown stool yesterday after several days without stooling. Of note, denies any side effects from prior radiation treatments. CT imaging with possible mild pancreatitis, no evidence of biliary or pancreatic ductal dilatation although pancreatic duct prominent on CT imaging with contrast. Lipase within normal limits.    PAST MEDICAL & SURGICAL HISTORY:  Coronary artery disease involving native coronary artery of native heart with unstable angina pectoris  s/p stents x 20      Type 2 diabetes mellitus without complication, without long-term current use of insulin      Hyperlipidemia, unspecified hyperlipidemia type      S/P cardiac cath      H/O lithotripsy    pancreatic cancer      MEDICATIONS  (STANDING):  aspirin enteric coated 81 milliGRAM(s) Oral daily  clopidogrel Tablet 75 milliGRAM(s) Oral daily  dextrose 10% Bolus 125 milliLiter(s) IV Bolus once  dextrose 5%. 1000 milliLiter(s) (100 mL/Hr) IV Continuous <Continuous>  dextrose 5%. 1000 milliLiter(s) (50 mL/Hr) IV Continuous <Continuous>  dextrose 50% Injectable 12.5 Gram(s) IV Push once  dextrose 50% Injectable 25 Gram(s) IV Push once  enoxaparin Injectable 40 milliGRAM(s) SubCutaneous every 24 hours  glucagon  Injectable 1 milliGRAM(s) IntraMuscular once  insulin glargine Injectable (LANTUS) 5 Unit(s) SubCutaneous at bedtime  insulin lispro (ADMELOG) corrective regimen sliding scale   SubCutaneous three times a day before meals  pantoprazole Infusion 8 mG/Hr (10 mL/Hr) IV Continuous <Continuous>  polyethylene glycol 3350 17 Gram(s) Oral daily  pregabalin 100 milliGRAM(s) Oral three times a day  sodium chloride 0.9%. 1000 milliLiter(s) (125 mL/Hr) IV Continuous <Continuous>  sucralfate suspension 1 Gram(s) Oral two times a day    MEDICATIONS  (PRN):  acetaminophen     Tablet .. 650 milliGRAM(s) Oral every 6 hours PRN Temp greater or equal to 38C (100.4F), Mild Pain (1 - 3)  aluminum hydroxide/magnesium hydroxide/simethicone Suspension 30 milliLiter(s) Oral every 4 hours PRN Dyspepsia  cyclobenzaprine 5 milliGRAM(s) Oral three times a day PRN Muscle Spasm  dextrose Oral Gel 15 Gram(s) Oral once PRN Blood Glucose LESS THAN 70 milliGRAM(s)/deciliter  HYDROmorphone   Tablet 2 milliGRAM(s) Oral every 3 hours PRN Moderate Pain (4 - 6)  HYDROmorphone  Injectable 0.5 milliGRAM(s) IV Push every 4 hours PRN Severe Pain (7 - 10)  melatonin 3 milliGRAM(s) Oral at bedtime PRN Insomnia  ondansetron Injectable 4 milliGRAM(s) IV Push every 8 hours PRN Nausea and/or Vomiting    Allergies    IV Contrast (Short breath)    Intolerances    SOCIAL HISTORY:  no smoking, drinking or drugs    FAMILY HISTORY:  no colon or stomach cancer    REVIEW OF SYSTEMS:    CONSTITUTIONAL: No weakness, fevers or chills  EYES/ENT: No visual changes;  No vertigo or throat pain   NECK: No pain or stiffness  RESPIRATORY: No cough, wheezing, hemoptysis; No shortness of breath  CARDIOVASCULAR: No chest pain or palpitations  GASTROINTESTINAL: See HPI  GENITOURINARY: No dysuria, frequency or hematuria  NEUROLOGICAL: No numbness or weakness  SKIN: No itching, burning, rashes, or lesions   PSYCH: Normal mood and affect  All other review of systems is negative unless indicated above.    Vital Signs Last 24 Hrs  T(C): 36.6 (24 May 2024 13:46), Max: 37.3 (24 May 2024 01:23)  T(F): 97.9 (24 May 2024 13:46), Max: 99.1 (24 May 2024 01:23)  HR: 69 (24 May 2024 13:46) (62 - 75)  BP: 131/64 (24 May 2024 13:46) (118/61 - 160/60)  BP(mean): 83 (24 May 2024 13:46) (67 - 93)  RR: 14 (24 May 2024 13:46) (14 - 18)  SpO2: 96% (24 May 2024 13:46) (95% - 98%)    Parameters below as of 24 May 2024 13:46  Patient On (Oxygen Delivery Method): room air    PHYSICAL EXAM:    Constitutional: No acute distress, non-toxic appearing  HEENT: unmasked, good phonation, not icteric  Neck: supple, no lymphadenopathy  Respiratory: clear to ascultation bilaterally, no wheezing  Cardiovascular: S1 and S2, regular rate and rhythm, no murmurs rubs or gallops  Gastrointestinal: soft, diffusely tender to deep palpation, softly distended, +bowel sounds, no rebound or guarding  Extremities: No peripheral edema, no cyanosis or clubbing  Vascular: 2+ peripheral pulses, no venous stasis  Neurological: A/O x 3, no focal deficits, no asterixis  Psychiatric: Normal mood, normal affect  Skin: No rashes, not jaundiced    LABS:                        12.7   5.49  )-----------( 274      ( 24 May 2024 02:01 )             38.9     05-24    138  |  107  |  15  ----------------------------<  186<H>  4.0   |  28  |  0.79    Ca    9.6      24 May 2024 02:01    TPro  7.1  /  Alb  3.3  /  TBili  0.9  /  DBili  x   /  AST  20  /  ALT  18  /  AlkPhos  95  05-24    PT/INR - ( 24 May 2024 02:01 )   PT: 11.1 sec;   INR: 0.98 ratio         PTT - ( 24 May 2024 02:01 )  PTT:28.8 sec  LIVER FUNCTIONS - ( 24 May 2024 02:01 )  Alb: 3.3 g/dL / Pro: 7.1 gm/dL / ALK PHOS: 95 U/L / ALT: 18 U/L / AST: 20 U/L / GGT: x             RADIOLOGY & ADDITIONAL STUDIES:  FINDINGS:  LOWER CHEST: Coronary artery calcifications.    LIVER: Within normal limits.  BILE DUCTS: Normal caliber.  GALLBLADDER: Within normal limits.  SPLEEN: Within normal limits.  PANCREAS: Mild prominence of the pancreatic duct in the tail of the   pancreas without definite pancreatic mass is seen. There is a small   metallic density at the body of pancreas possibly radiation fiducial.   Mild haziness of the fat in the region of the pancreatic body and head   possibly from radiation changes. The superior mesenteric artery is patent   however it appears to have irregular walls which may also be sequela of   radiation treatment.  ADRENALS: Within normal limits.  KIDNEYS/URETERS: Renal cysts and hypodensities too small to characterize.   Tiny bilateral nonobstructing renal calcifications.    BLADDER: The urinary bladder is mildly distended. There is a tiny droplet   of air in the bladder question recent instrumentation.  REPRODUCTIVE ORGANS: Uterus and adnexa within normal limits.    BOWEL: No bowel obstruction. Appendix is normal.  PERITONEUM: No ascites.  VESSELS: Atherosclerotic changes.  RETROPERITONEUM/LYMPH NODES: No lymphadenopathy.  ABDOMINAL WALL: Within normal limits.  BONES: Degenerative changes.    IMPRESSION:    Findings in the pancreas as described above suggesting postradiation   treatment. Correlate with the patient's history. Correlate clinically for   pancreatitis.    The urinary bladder is mildly distended. There is a tiny droplet of air   in the bladder question recent instrumentation.    FINDINGS:  LOWER CHEST: Within normal limits.    LIVER: Within normal limits.  BILE DUCTS: Normal caliber.  GALLBLADDER: Within normal limits.  SPLEEN: Within normal limits.  PANCREAS: Metallic structure pancreatic body, edema adjacent to the   pancreatic head and neck and extending into the superior mesenteric fat   similar to prior. The pancreatic ductal dilation in the pancreatic tail   is less well seen on this noncontrast examination. No mass is evident.  ADRENALS: Within normal limits.  KIDNEYS/URETERS: No hydronephrosis. 1 right and 4 left small   nonobstructing renal stones. 3 cm cyst upper pole right kidney.    BLADDER: Within normal limits.  REPRODUCTIVE ORGANS: Uterus and adnexa within normal limits.    BOWEL: No bowel obstruction. Appendix is normal. Scattered left colonic   diverticula, no diverticulitis.  PERITONEUM: No ascites.  VESSELS: Atherosclerotic changes.  RETROPERITONEUM/LYMPH NODES: No lymphadenopathy.  ABDOMINAL WALL: Within normal limits.  BONES: Degenerative changes most prominent L3-4 similar to prior. No   fracture or aggressive osseous lesions.    IMPRESSION:  Edema adjacent to the pancreatic head and neck, similar to prior. Most   likely postradiation changes. Superimposed mild acute or recurrent   pancreatitis is possible.

## 2024-05-24 NOTE — PATIENT PROFILE ADULT - FALL HARM RISK - HARM RISK INTERVENTIONS

## 2024-05-24 NOTE — ED PROVIDER NOTE - OBJECTIVE STATEMENT
73-year-old female presents to ED after recent discharge from [facility] to the hospital secondary to pancreatitis from likely radiation induced pancreatitis was home for a couple hours had severe abdominal pain consistent with pain she was having in the hospital took her OxyContin dose pain was not controlled daughter became concerned when patient was in severe pain at my evaluation patient describes describes epigastric constant sharp abdominal pain no nausea vomiting no fevers no chest pain or shortness of breath

## 2024-05-24 NOTE — PHARMACOTHERAPY INTERVENTION NOTE - COMMENTS
Medication reconciliation completed.  Pt recently dc'd from  yesterday 5/23, current medication list taken from Pt Discharge Paperwork; confirmed with Dr. [facility].

## 2024-05-24 NOTE — ED ADULT TRIAGE NOTE - CHIEF COMPLAINT QUOTE
[facility]. States she was admitted for pancreatitis and DC with pain meds. States pain meds are not helping and pain is now unbearable. Advised to come to ED for IV pain control. Took Oxycodone at 10pm and Tylenol at 1130 with no relief.

## 2024-05-24 NOTE — ED ADULT NURSE NOTE - CHIEF COMPLAINT QUOTE
[facility]. States she was admitted for pancreatitis and DC with pain meds. States pain meds are not helping and pain is now unbearable. Advised to come to ED for IV pain control. Took Oxycodone at 10pm and Tylenol at 1130 with no relief.

## 2024-05-24 NOTE — CONSULT NOTE ADULT - NS ATTEND AMEND GEN_ALL_CORE FT
73 year old woman with recurrent vs ongoing pancreatitis, abdominal pain.   F/u MRCP to evaluate ductal and parenchyma of pancreas.   Advance diet as tolerated.   LR at 200cc/hr in first 24 hours, then maintenance.   Coverage to follow this weekend.

## 2024-05-24 NOTE — CONSULT NOTE ADULT - ASSESSMENT
73F w/ PMH of pancreatic cancer local mets, on radiation therapy, from [facility], CAD, stents, DMII, HLD p/w abdominal pain and back pain.     # h/o Pancreatic adenocarcinoma   - pt not on any active treatment for h/o pancreatic ca   - discussed with daughter at bedside  - CT abdomen- Edema adjacent to the pancreatic head and neck, similar to prior. Most likely postradiation changes. Superimposed mild acute or recurrent pancreatitis is possible.  - labs appropriate and stable, lipase 7  - plan to admit to focus on pain control - given dilaudid with improvement   - bowel regimen- last BM was yesterday but was constipated prior   - will have CT compared to outpatient imaging at [facility]     will follow daily

## 2024-05-24 NOTE — ED PROVIDER NOTE - CLINICAL SUMMARY MEDICAL DECISION MAKING FREE TEXT BOX
patient's pain was able to be controlled with IV Dilaudid resting comfortably now in a shared decision-making format with patient's daughter plan of care currently is to watch patient throughout the morning see if her pain recurs then will likely need readmission to the hospital for intractable abdominal pain she was just discharged from the hospital we will have heme-onc see again is there a role for steroids in this case labs normal no guarding no fever will require stay in the ED throughout the morning to best evaluate for her ultimate disposition patient's daughter agrees with this plan of care

## 2024-05-24 NOTE — ED ADULT NURSE NOTE - NSFALLUNIVINTERV_ED_ALL_ED
Bed/Stretcher in lowest position, wheels locked, appropriate side rails in place/Call bell, personal items and telephone in reach/Instruct patient to call for assistance before getting out of bed/chair/stretcher/Non-slip footwear applied when patient is off stretcher/[place] to call system/Physically safe environment - no spills, clutter or unnecessary equipment/Purposeful proactive rounding/Room/bathroom lighting operational, light cord in reach

## 2024-05-24 NOTE — CONSULT NOTE ADULT - ASSESSMENT
73 year old female with history pancreatic cancer s/p XRT 2022 hospitalized for likely radiation induced pancreatitis, now with recurrent abdominal pain refractory to pain medication with on imaging prominent PD, edema adjacent to head/neck with normal LFTs and lipase, no leukocytosis.    Imp: Abdominal pain with differentials including radiation-induced pancreatitis/stricture/gastritis    Rec:  ::Continue IV PPI  ::Obtain MRCP for further evaluation  ::Clears and adv as chantale post mrcp  ::Antiemetics  ::Dulcolax suppository (bowel regimen) 73 year old female with history pancreatic cancer s/p XRT 2022 hospitalized for pancreatitis, now with recurrent abdominal pain refractory to pain medication with on imaging prominent PD, edema adjacent to head/neck with normal LFTs and lipase, no leukocytosis.    Imp: Abdominal pain with differentials including pancreatitis/stricture/gastritis    Rec:  ::Continue IV PPI  ::Obtain MRCP for further evaluation  ::Clears and adv as chantale post mrcp  ::Antiemetics  ::Dulcolax suppository (bowel regimen)

## 2024-05-24 NOTE — PATIENT PROFILE ADULT - CAREGIVER ADDRESS
[address] [place] [address] [address] [facility] [address]

## 2024-05-24 NOTE — H&P ADULT - HISTORY OF PRESENT ILLNESS
73-year-old female presents to ED after recent discharge from [facility] to the hospital secondary to pancreatitis from likely radiation induced pancreatitis was home for a couple hours had severe abdominal pain consistent with pain she was having in the hospital took her OxyContin dose pain was not controlled daughter became concerned when patient was in severe pain at my evaluation patient describes describes epigastric constant sharp abdominal pain no nausea vomiting no fevers . Had CT few days ago another one was not ordered. Pt on no treatment for her Pa cancer, for local mets received RXT. Adm for further work up including r/o active cancer. RXT was a year ago. 73-year-old female presents to ED after recent discharge from [facility] to the hospital secondary to pancreatitis from likely radiation induced pancreatitis was home for a couple hours had severe abdominal pain consistent with pain she was having in the hospital took her OxyContin dose pain was not controlled daughter became concerned when patient was in severe pain at my evaluation patient describes describes epigastric constant sharp abdominal pain no nausea vomiting no fevers . Had CT few days ago another one was not ordered. Pt on no treatment for her Pa cancer, for local mets received RXT. Adm for further work up including r/o active cancer. RXT was a year ago.  This is most likely recurrence of Pancreatic cancer.

## 2024-05-24 NOTE — CONSULT NOTE ADULT - SUBJECTIVE AND OBJECTIVE BOX
HPI:    73F w/ PMH of pancreatic cancer local mets, [place], [place] [facility], st[place], DMII, HLD p/w abdominal pain and back pain.     She's been having abdominal pain chronically, thought to be from gastritis and usually improves with Maalox. She was also referred to GI for EGD by her oncologist. She has also had back pain chronically, recently has a new grandchild at home, and she has been more active. Her abdominal pain and back pain worsened on  pains for a chronically, worsened last Friday night, she [place] [facility] [place] an MRI done which showed diffuse degenerative changes with multi-level disc herniations. 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 She was discharged and presents today for same symptoms. She reports pain is epigastric 8/10, sharp and burning, associated with poor oral intake for 1 day, nausea and 1 ep of vomiting, weight loss of 3-4 lbs over past week. Also reports worsened back pain, mid thoracic, radiated up the spine. Pain is worse with standing, better when hunched over.    In ED 5/22 vitals stable, CBC/CMP unremarkable.   Imaging noted for radiation induced pancreatitis, similar to CT abd 1 week ago.   Admitted to  for subacute pancreatitis, and intractable back pain.  Pt was discharged yesterday from    Now returns with worsening abdominal pain       PAST MEDICAL & SURGICAL HISTORY:  Coronary artery disease involving native coronary artery of native heart with unstable angina pectoris  s/p stents x 20      Type 2 diabetes mellitus without complication, without long-term current use of insulin      Hyperlipidemia, unspecified hyperlipidemia type      S/P cardiac cath      H/O lithotripsy          Allergies    IV Contrast (Short breath)    Intolerances        MEDICATIONS  (STANDING):    MEDICATIONS  (PRN):      FAMILY HISTORY:      SOCIAL HISTORY: No EtOH, no tobacco    REVIEW OF SYSTEMS:    CONSTITUTIONAL: No weakness, fevers or chills  EYES/ENT: No visual changes;  No vertigo or throat pain   NECK: No pain or stiffness  RESPIRATORY: No cough, wheezing, hemoptysis; No shortness of breath  CARDIOVASCULAR: No chest pain or palpitations  GASTROINTESTINAL: +abdominal or epigastric pain. No nausea, vomiting, or hematemesis; No diarrhea or constipation. No melena or hematochezia.  GENITOURINARY: No dysuria, frequency or hematuria  NEUROLOGICAL: No numbness or weakness  SKIN: No itching, burning, rashes, or lesions   All other review of systems is negative unless indicated above.    Height (cm): 162.6 (05-24 @ 01:22)  Weight (kg): 53.9 (05-24 @ 01:23)  BMI (kg/m2): 20.4 (05-24 @ 01:23)  BSA (m2): 1.57 (05-24 @ 01:23)    T(F): 99.1 (05-24-24 @ 01:23), Max: 99.1 (05-24-24 @ 01:23)  HR: 70 (05-24-24 @ 06:39)  BP: 154/56 (05-24-24 @ 06:39)  RR: 17 (05-24-24 @ 06:39)  SpO2: 95% (05-24-24 @ 06:39)  Wt(kg): --    GENERAL: NAD, well-developed  EYES: EOMI,   CHEST/LUNG: Clear to auscultation bilaterally; No wheeze  HEART: Regular rate and rhythm;   ABDOMEN: Soft, abd pain to palpation   EXTREMITIES:  no edema  NEUROLOGY: non-focal  SKIN: No rashes or lesions                          12.7   5.49  )-----------( 274      ( 24 May 2024 02:01 )             38.9       05-24    138  |  107  |  15  ----------------------------<  186<H>  4.0   |  28  |  0.79    Ca    9.6      24 May 2024 02:01    TPro  7.1  /  Alb  3.3  /  TBili  0.9  /  DBili  x   /  AST  20  /  ALT  18  /  AlkPhos  95  05-24          PT/INR - ( 24 May 2024 02:01 )   PT: 11.1 sec;   INR: 0.98 ratio         PTT - ( 24 May 2024 02:01 )  PTT:28.8 sec    Clean Catch None  05-22 @ 02:39   <10,000 CFU/mL Normal Urogenital Kait  --  --      Clean Catch None  05-18 @ 22:43   <10,000 CFU/mL Normal Urogenital Kait  --  --

## 2024-05-24 NOTE — H&P ADULT - NSHPLABSRESULTS_GEN_ALL_CORE
12.7   5.49  )-----------( 274      ( 24 May 2024 02:01 )             38.9   05-24    138  |  107  |  15  ----------------------------<  186<H>  4.0   |  28  |  0.79    Ca    9.6      24 May 2024 02:01    TPro  7.1  /  Alb  3.3  /  TBili  0.9  /  DBili  x   /  AST  20  /  ALT  18  /  AlkPhos  95  05-24

## 2024-05-24 NOTE — ED ADULT NURSE REASSESSMENT NOTE - NS ED NURSE REASSESS COMMENT FT1
Assumed care of pt from overnight RN. Patient pending heme/onc consult this AM. Family at bedside. Patient resting comfortably in stretcher at this time.
EKG completed. Daughter at bedside, Pt endorses pain relief after medication was given. No other complaints at this time. No acute signs of distress.
Patient transported to  accompanied by family via patient transport in stretcher. Pantoprazole gtt ordered STAT, requested via order message manager to tube medication to 1N as patient will be on that floor.
Pt resting in stretcher sleepy and arousable to voice, visitor at bedside, pt has relief after administration of medications, comfort and safety maintained
pt was awoken from sleep for ambulation trial. Pt has a very unsteady gait was placed back in stretcher. MD Hammond at bedside. Pt remains in view of nursing station, safety being maintained. NO acute signs of distress., pt remains drowsy. Pt was cleaned, placed in red socks, yellow gown, fall risk bracelet was replaced.

## 2024-05-24 NOTE — ED ADULT NURSE NOTE - OBJECTIVE STATEMENT
pt c/o abd pain radiating to lower back. pt was d/c from  2 hours ago with pancreatitis admission. sent home on oral oxycodone taken PTA with no relief. pt denies fevers, SOB, CP, HA/dizziness, hematuria, diarhhea at this time. Pt appears tearful with guarding and grimacing  pt A&Ox4 ambulatory at baseline. 20g PIV placed right hand  pt PMH pancreatic cancer

## 2024-05-25 LAB
ANION GAP SERPL CALC-SCNC: 2 MMOL/L — LOW (ref 5–17)
BUN SERPL-MCNC: 10 MG/DL — SIGNIFICANT CHANGE UP (ref 7–23)
CALCIUM SERPL-MCNC: 8.8 MG/DL — SIGNIFICANT CHANGE UP (ref 8.5–10.1)
CANCER AG19-9 SERPL-ACNC: 14 U/ML — SIGNIFICANT CHANGE UP
CHLORIDE SERPL-SCNC: 111 MMOL/L — HIGH (ref 96–108)
CO2 SERPL-SCNC: 29 MMOL/L — SIGNIFICANT CHANGE UP (ref 22–31)
CREAT SERPL-MCNC: 0.62 MG/DL — SIGNIFICANT CHANGE UP (ref 0.5–1.3)
EGFR: 94 ML/MIN/1.73M2 — SIGNIFICANT CHANGE UP
GLUCOSE BLDC GLUCOMTR-MCNC: 103 MG/DL — HIGH (ref 70–99)
GLUCOSE BLDC GLUCOMTR-MCNC: 112 MG/DL — HIGH (ref 70–99)
GLUCOSE BLDC GLUCOMTR-MCNC: 159 MG/DL — HIGH (ref 70–99)
GLUCOSE BLDC GLUCOMTR-MCNC: 82 MG/DL — SIGNIFICANT CHANGE UP (ref 70–99)
GLUCOSE SERPL-MCNC: 114 MG/DL — HIGH (ref 70–99)
HCT VFR BLD CALC: 34.2 % — LOW (ref 34.5–45)
HGB BLD-MCNC: 11 G/DL — LOW (ref 11.5–15.5)
MCHC RBC-ENTMCNC: 28.9 PG — SIGNIFICANT CHANGE UP (ref 27–34)
MCHC RBC-ENTMCNC: 32.2 GM/DL — SIGNIFICANT CHANGE UP (ref 32–36)
MCV RBC AUTO: 89.8 FL — SIGNIFICANT CHANGE UP (ref 80–100)
PLATELET # BLD AUTO: 235 K/UL — SIGNIFICANT CHANGE UP (ref 150–400)
POTASSIUM SERPL-MCNC: 4 MMOL/L — SIGNIFICANT CHANGE UP (ref 3.5–5.3)
POTASSIUM SERPL-SCNC: 4 MMOL/L — SIGNIFICANT CHANGE UP (ref 3.5–5.3)
RBC # BLD: 3.81 M/UL — SIGNIFICANT CHANGE UP (ref 3.8–5.2)
RBC # FLD: 13.2 % — SIGNIFICANT CHANGE UP (ref 10.3–14.5)
SODIUM SERPL-SCNC: 142 MMOL/L — SIGNIFICANT CHANGE UP (ref 135–145)
WBC # BLD: 4.76 K/UL — SIGNIFICANT CHANGE UP (ref 3.8–10.5)
WBC # FLD AUTO: 4.76 K/UL — SIGNIFICANT CHANGE UP (ref 3.8–10.5)

## 2024-05-25 PROCEDURE — 99233 SBSQ HOSP IP/OBS HIGH 50: CPT

## 2024-05-25 PROCEDURE — 74018 RADEX ABDOMEN 1 VIEW: CPT | Mod: 26

## 2024-05-25 RX ORDER — POLYETHYLENE GLYCOL 3350 17 G/17G
17 POWDER, FOR SOLUTION ORAL
Refills: 0 | Status: DISCONTINUED | OUTPATIENT
Start: 2024-05-25 | End: 2024-06-05

## 2024-05-25 RX ORDER — SENNA PLUS 8.6 MG/1
2 TABLET ORAL AT BEDTIME
Refills: 0 | Status: DISCONTINUED | OUTPATIENT
Start: 2024-05-25 | End: 2024-05-27

## 2024-05-25 RX ORDER — MINERAL OIL
133 OIL (ML) MISCELLANEOUS ONCE
Refills: 0 | Status: DISCONTINUED | OUTPATIENT
Start: 2024-05-25 | End: 2024-05-27

## 2024-05-25 RX ORDER — HYDROMORPHONE HYDROCHLORIDE 2 MG/ML
0.5 INJECTION INTRAMUSCULAR; INTRAVENOUS; SUBCUTANEOUS
Refills: 0 | Status: DISCONTINUED | OUTPATIENT
Start: 2024-05-25 | End: 2024-05-27

## 2024-05-25 RX ORDER — HYDROMORPHONE HYDROCHLORIDE 2 MG/ML
0.5 INJECTION INTRAMUSCULAR; INTRAVENOUS; SUBCUTANEOUS
Refills: 0 | Status: DISCONTINUED | OUTPATIENT
Start: 2024-05-25 | End: 2024-05-25

## 2024-05-25 RX ORDER — PANTOPRAZOLE SODIUM 20 MG/1
40 TABLET, DELAYED RELEASE ORAL
Refills: 0 | Status: DISCONTINUED | OUTPATIENT
Start: 2024-05-25 | End: 2024-06-05

## 2024-05-25 RX ADMIN — HYDROMORPHONE HYDROCHLORIDE 0.5 MILLIGRAM(S): 2 INJECTION INTRAMUSCULAR; INTRAVENOUS; SUBCUTANEOUS at 03:09

## 2024-05-25 RX ADMIN — Medication 1 GRAM(S): at 21:59

## 2024-05-25 RX ADMIN — HYDROMORPHONE HYDROCHLORIDE 2 MILLIGRAM(S): 2 INJECTION INTRAMUSCULAR; INTRAVENOUS; SUBCUTANEOUS at 14:46

## 2024-05-25 RX ADMIN — PANTOPRAZOLE SODIUM 10 MG/HR: 20 TABLET, DELAYED RELEASE ORAL at 08:05

## 2024-05-25 RX ADMIN — Medication 81 MILLIGRAM(S): at 21:58

## 2024-05-25 RX ADMIN — SENNA PLUS 2 TABLET(S): 8.6 TABLET ORAL at 21:59

## 2024-05-25 RX ADMIN — POLYETHYLENE GLYCOL 3350 17 GRAM(S): 17 POWDER, FOR SOLUTION ORAL at 10:04

## 2024-05-25 RX ADMIN — SODIUM CHLORIDE 125 MILLILITER(S): 9 INJECTION INTRAMUSCULAR; INTRAVENOUS; SUBCUTANEOUS at 14:38

## 2024-05-25 RX ADMIN — HYDROMORPHONE HYDROCHLORIDE 0.5 MILLIGRAM(S): 2 INJECTION INTRAMUSCULAR; INTRAVENOUS; SUBCUTANEOUS at 03:25

## 2024-05-25 RX ADMIN — Medication 10 MILLIGRAM(S): at 13:04

## 2024-05-25 RX ADMIN — Medication 100 MILLIGRAM(S): at 21:59

## 2024-05-25 RX ADMIN — HYDROMORPHONE HYDROCHLORIDE 2 MILLIGRAM(S): 2 INJECTION INTRAMUSCULAR; INTRAVENOUS; SUBCUTANEOUS at 06:52

## 2024-05-25 RX ADMIN — Medication 100 MILLIGRAM(S): at 05:40

## 2024-05-25 RX ADMIN — CLOPIDOGREL BISULFATE 75 MILLIGRAM(S): 75 TABLET, FILM COATED ORAL at 10:04

## 2024-05-25 RX ADMIN — HYDROMORPHONE HYDROCHLORIDE 2 MILLIGRAM(S): 2 INJECTION INTRAMUSCULAR; INTRAVENOUS; SUBCUTANEOUS at 23:11

## 2024-05-25 RX ADMIN — Medication 100 MILLIGRAM(S): at 13:04

## 2024-05-25 RX ADMIN — HYDROMORPHONE HYDROCHLORIDE 0.5 MILLIGRAM(S): 2 INJECTION INTRAMUSCULAR; INTRAVENOUS; SUBCUTANEOUS at 07:59

## 2024-05-25 RX ADMIN — Medication 1: at 17:56

## 2024-05-25 RX ADMIN — HYDROMORPHONE HYDROCHLORIDE 2 MILLIGRAM(S): 2 INJECTION INTRAMUSCULAR; INTRAVENOUS; SUBCUTANEOUS at 05:52

## 2024-05-25 RX ADMIN — SODIUM CHLORIDE 125 MILLILITER(S): 9 INJECTION INTRAMUSCULAR; INTRAVENOUS; SUBCUTANEOUS at 23:11

## 2024-05-25 RX ADMIN — PANTOPRAZOLE SODIUM 40 MILLIGRAM(S): 20 TABLET, DELAYED RELEASE ORAL at 21:59

## 2024-05-25 RX ADMIN — POLYETHYLENE GLYCOL 3350 17 GRAM(S): 17 POWDER, FOR SOLUTION ORAL at 21:56

## 2024-05-25 RX ADMIN — SODIUM CHLORIDE 125 MILLILITER(S): 9 INJECTION INTRAMUSCULAR; INTRAVENOUS; SUBCUTANEOUS at 05:52

## 2024-05-25 RX ADMIN — Medication 1 GRAM(S): at 10:04

## 2024-05-25 NOTE — DIETITIAN INITIAL EVALUATION ADULT - ETIOLOGY
r/t inability to meet increased needs 2/2 pancreatitis (w/ abd pain) on recurrent pancreatic cancer

## 2024-05-25 NOTE — PROGRESS NOTE ADULT - ASSESSMENT
73-year-old female presents to ED after recent discharge from [facility] to the hospital secondary to pancreatitis from likely radiation induced pancreatitis was home for a couple hours had severe abdominal pain consistent with pain she was having in the hospital took her OxyContin dose pain was not controlled daughter became concerned when patient was in severe pain at my evaluation patient describes describes epigastric constant sharp abdominal pain no nausea vomiting no fevers . Had CT few days ago another one was not ordered. Pt on no treatment for her Pa cancer, for local mets received RXT. Adm for further work up including r/o active cancer. RXT was a year ago.    * Recurrent abd pain, doesnt seem like acute pancreatitis with normal lipase (not on creon at home)  GI consult reviewed- mrcp report pending  Dilaudid IV and PO-adjust to achieve pain control  -clears and AAT  -ondansetron prn  -bowel regimen  -PPI bid    * T2DM  -lantus 5 and sliding scale    * CAD stents  -aspirin , clopidogrel     PT eval: pending    DVT proph:   	lovenox   Code Status: FULL code 	  dispo: discharge pending improvement in clinical status    Outpatient follow-up:   with PMD   73-year-old female presents to ED after recent discharge from [facility] to the hospital secondary to pancreatitis from likely radiation induced pancreatitis was home for a couple hours had severe abdominal pain consistent with pain she was having in the hospital took her OxyContin dose pain was not controlled daughter became concerned when patient was in severe pain at my evaluation patient describes describes epigastric constant sharp abdominal pain no nausea vomiting no fevers . Had CT few days ago another one was not ordered. Pt on no treatment for her Pa cancer, for local mets received RXT. Adm for further work up including r/o active cancer. RXT was a year ago.    * Recurrent abd pain, doesnt seem like acute pancreatitis with normal lipase (not on creon at home)  GI consult reviewed- mrcp report pending  Dilaudid IV and PO-adjust to achieve pain control  -clears and AAT  -ondansetron prn  -bowel regimen  -PPI bid  -axr today to eval for ileus    * T2DM  -lantus 5 and sliding scale    * CAD stents  -aspirin , clopidogrel     PT eval: pending    DVT proph:   	lovenox   Code Status: FULL code 	  dispo: discharge pending improvement in clinical status    Outpatient follow-up:   with PMD

## 2024-05-25 NOTE — PROGRESS NOTE ADULT - ASSESSMENT
73 year old female with history pancreatic cancer s/p XRT 2022 hospitalized for pancreatitis, now with recurrent abdominal pain refractory to pain medication with on imaging prominent PD, edema adjacent to head/neck with normal LFTs and lipase, no leukocytosis.    Imp: Abdominal pain with differentials including pancreatitis/stricture/gastritis    Rec:  ::Continue IV PPI but does not need gtt. can give IV BID  ::Obtain MRCP for further evaluation-done and pending report  ::Clears and adv as chantale post mrcp  ::Antiemetics  ::Dulcolax suppository (bowel regimen)  pain mgmt  d/w dtr at bedside

## 2024-05-25 NOTE — DIETITIAN INITIAL EVALUATION ADULT - ORAL INTAKE PTA/DIET HISTORY
Obtained all information from daughter at bed-side. Lives at home w/ daughter; daughter cooks/grocery shops for household. Normally w/ fair appetite, consumes 3 small meals/day and likely meeting <75% of ENN chronically. However, now w/ poor PO intake and consuming <50% of ENN >/= 1 week 2/2 abd pain, constipation. Attempts to follow CHO Consistent diet at home, now low fat x 1 week (due to recent dx of pancreatitis). W/ T2DM - takes lantus and glipizide at home; w/ free-style shahzad CGM to monitor BGL. F/u w/ endocrinologist every 6 mo. W/ pancreatitic cancer - stopped chemo/radiation x 1 yr ago

## 2024-05-25 NOTE — PROGRESS NOTE ADULT - SUBJECTIVE AND OBJECTIVE BOX
INTERVAL HPI/OVERNIGHT EVENTS:  Patient S&E at bedside. No o/n events, seen with daughter at bedside.   continues to report ongoing pain   + flatus     VITAL SIGNS:  T(F): 98.3 (05-25-24 @ 09:25)  HR: 71 (05-25-24 @ 09:25)  BP: 142/56 (05-25-24 @ 09:25)  RR: 17 (05-25-24 @ 09:25)  SpO2: 95% (05-25-24 @ 09:25)  Wt(kg): --    PHYSICAL EXAM:    Constitutional: NAD  Eyes: EOMI, sclera non-icteric  Respiratory: CTA b/l, good air entry b/l ausculated anteriorly   Cardiovascular: RRR, no M/R/G  Gastrointestinal: soft, distended  Extremities: no c/c/e  Neurological: AAOx3      MEDICATIONS  (STANDING):  aspirin enteric coated 81 milliGRAM(s) Oral daily  bisacodyl Suppository 10 milliGRAM(s) Rectal once  clopidogrel Tablet 75 milliGRAM(s) Oral daily  dextrose 10% Bolus 125 milliLiter(s) IV Bolus once  dextrose 5%. 1000 milliLiter(s) (100 mL/Hr) IV Continuous <Continuous>  dextrose 5%. 1000 milliLiter(s) (50 mL/Hr) IV Continuous <Continuous>  dextrose 50% Injectable 12.5 Gram(s) IV Push once  dextrose 50% Injectable 25 Gram(s) IV Push once  enoxaparin Injectable 40 milliGRAM(s) SubCutaneous every 24 hours  glucagon  Injectable 1 milliGRAM(s) IntraMuscular once  insulin glargine Injectable (LANTUS) 5 Unit(s) SubCutaneous at bedtime  insulin lispro (ADMELOG) corrective regimen sliding scale   SubCutaneous three times a day before meals  pantoprazole  Injectable 40 milliGRAM(s) IV Push two times a day  polyethylene glycol 3350 17 Gram(s) Oral two times a day  pregabalin 100 milliGRAM(s) Oral three times a day  senna 2 Tablet(s) Oral at bedtime  sodium chloride 0.9%. 1000 milliLiter(s) (125 mL/Hr) IV Continuous <Continuous>  sucralfate suspension 1 Gram(s) Oral two times a day    MEDICATIONS  (PRN):  acetaminophen     Tablet .. 650 milliGRAM(s) Oral every 6 hours PRN Temp greater or equal to 38C (100.4F), Mild Pain (1 - 3)  aluminum hydroxide/magnesium hydroxide/simethicone Suspension 30 milliLiter(s) Oral every 4 hours PRN Dyspepsia  bisacodyl Suppository 10 milliGRAM(s) Rectal daily PRN Constipation  cyclobenzaprine 5 milliGRAM(s) Oral three times a day PRN Muscle Spasm  dextrose Oral Gel 15 Gram(s) Oral once PRN Blood Glucose LESS THAN 70 milliGRAM(s)/deciliter  HYDROmorphone   Tablet 2 milliGRAM(s) Oral every 3 hours PRN Moderate Pain (4 - 6)  HYDROmorphone  Injectable 0.5 milliGRAM(s) IV Push every 2 hours PRN Severe Pain (7 - 10)  melatonin 3 milliGRAM(s) Oral at bedtime PRN Insomnia  ondansetron Injectable 4 milliGRAM(s) IV Push every 8 hours PRN Nausea and/or Vomiting      Allergies    IV Contrast (Short breath)    Intolerances        LABS:                        11.0   4.76  )-----------( 235      ( 25 May 2024 06:47 )             34.2     05-25    142  |  111<H>  |  10  ----------------------------<  114<H>  4.0   |  29  |  0.62    Ca    8.8      25 May 2024 06:47    TPro  7.1  /  Alb  3.3  /  TBili  0.9  /  DBili  x   /  AST  20  /  ALT  18  /  AlkPhos  95  05-24    PT/INR - ( 24 May 2024 02:01 )   PT: 11.1 sec;   INR: 0.98 ratio         PTT - ( 24 May 2024 02:01 )  PTT:28.8 sec  Urinalysis Basic - ( 25 May 2024 06:47 )    Color: x / Appearance: x / SG: x / pH: x  Gluc: 114 mg/dL / Ketone: x  / Bili: x / Urobili: x   Blood: x / Protein: x / Nitrite: x   Leuk Esterase: x / RBC: x / WBC x   Sq Epi: x / Non Sq Epi: x / Bacteria: x        RADIOLOGY & ADDITIONAL TESTS:  Studies reviewed.    ASSESSMENT & PLAN:

## 2024-05-25 NOTE — PROGRESS NOTE ADULT - SUBJECTIVE AND OBJECTIVE BOX
no overnight events.   daughter at bedside.  has abdominal pain and has had constipation for a few days now. last BM 5/23 was very small  feels the IV dilaudid is helping, it lasts about 1 hr, then the pain starts to return slowly and then suddenly hits her at about hour 2.   pain goes from 8 to 0-1    Physical Exam  T(C): 36.8 (05-25-24 @ 09:25), Max: 37.1 (05-24-24 @ 21:10)  HR: 71 (05-25-24 @ 09:25) (68 - 77)  BP: 142/56 (05-25-24 @ 09:25) (125/80 - 142/56)  RR: 17 (05-25-24 @ 09:25) (14 - 18)  SpO2: 95% (05-25-24 @ 09:25) (94% - 98%)  General Appearance nad, no head trauma.   EYES no scleral icterus.   CHEST clear to auscultation bilaterally, no wheezes/rhonchi/rales.   HEART RRR, normal S1 S2, no murmurs, clicks or rubs.   ABDOMEN soft tender generalized, ND, BS present.   EXTREMITIES no clubbing, no cyanosis, no edema.   Skin normal skin, limited exam.   Psych normal affect.     Pertinent Labs/Imaging:  lipase 7  ct a/p  IMPRESSION:  Edema adjacent to the pancreatic head and neck, similar to prior. Most   likely postradiation changes. Superimposed mild acute or recurrent   pancreatitis is possible.

## 2024-05-25 NOTE — DIETITIAN INITIAL EVALUATION ADULT - PERTINENT MEDS FT
MEDICATIONS  (STANDING):  aspirin enteric coated 81 milliGRAM(s) Oral daily  clopidogrel Tablet 75 milliGRAM(s) Oral daily  dextrose 10% Bolus 125 milliLiter(s) IV Bolus once  dextrose 5%. 1000 milliLiter(s) (100 mL/Hr) IV Continuous <Continuous>  dextrose 5%. 1000 milliLiter(s) (50 mL/Hr) IV Continuous <Continuous>  dextrose 50% Injectable 12.5 Gram(s) IV Push once  dextrose 50% Injectable 25 Gram(s) IV Push once  enoxaparin Injectable 40 milliGRAM(s) SubCutaneous every 24 hours  glucagon  Injectable 1 milliGRAM(s) IntraMuscular once  insulin glargine Injectable (LANTUS) 5 Unit(s) SubCutaneous at bedtime  insulin lispro (ADMELOG) corrective regimen sliding scale   SubCutaneous three times a day before meals  pantoprazole Infusion 8 mG/Hr (10 mL/Hr) IV Continuous <Continuous>  polyethylene glycol 3350 17 Gram(s) Oral daily  pregabalin 100 milliGRAM(s) Oral three times a day  sodium chloride 0.9%. 1000 milliLiter(s) (125 mL/Hr) IV Continuous <Continuous>  sucralfate suspension 1 Gram(s) Oral two times a day    MEDICATIONS  (PRN):  acetaminophen     Tablet .. 650 milliGRAM(s) Oral every 6 hours PRN Temp greater or equal to 38C (100.4F), Mild Pain (1 - 3)  aluminum hydroxide/magnesium hydroxide/simethicone Suspension 30 milliLiter(s) Oral every 4 hours PRN Dyspepsia  cyclobenzaprine 5 milliGRAM(s) Oral three times a day PRN Muscle Spasm  dextrose Oral Gel 15 Gram(s) Oral once PRN Blood Glucose LESS THAN 70 milliGRAM(s)/deciliter  HYDROmorphone   Tablet 2 milliGRAM(s) Oral every 3 hours PRN Moderate Pain (4 - 6)  HYDROmorphone  Injectable 0.5 milliGRAM(s) IV Push every 4 hours PRN Severe Pain (7 - 10)  melatonin 3 milliGRAM(s) Oral at bedtime PRN Insomnia  ondansetron Injectable 4 milliGRAM(s) IV Push every 8 hours PRN Nausea and/or Vomiting

## 2024-05-25 NOTE — PROGRESS NOTE ADULT - SUBJECTIVE AND OBJECTIVE BOX
GI coverage Dr. Raya    73 year old woman with history pancreatic cancer s/p XRT 2022 recently hospitalized for pancreatitis, admitted with abdominal pain, pancreatitis.     abd pain persists, mid abdomen  no n/v  chantale PO  no BM    REVIEW OF SYSTEMS:    CONSTITUTIONAL: No weakness, fevers or chills  EYES/ENT: No visual changes;  No vertigo or throat pain   NECK: No pain or stiffness  RESPIRATORY: No cough, wheezing, hemoptysis; No shortness of breath  CARDIOVASCULAR: No chest pain or palpitations  GASTROINTESTINAL: See HPI  GENITOURINARY: No dysuria, frequency or hematuria  NEUROLOGICAL: No numbness or weakness  SKIN: No itching, burning, rashes, or lesions   PSYCH: Normal mood and affect  All other review of systems is negative unless indicated above.    Vital Signs Last 24 Hrs  T(C): 36.8 (25 May 2024 09:25), Max: 37.1 (24 May 2024 21:10)  T(F): 98.3 (25 May 2024 09:25), Max: 98.7 (24 May 2024 21:10)  HR: 71 (25 May 2024 09:25) (68 - 77)  BP: 142/56 (25 May 2024 09:25) (125/80 - 142/56)  BP(mean): 83 (24 May 2024 13:46) (83 - 83)  RR: 17 (25 May 2024 09:25) (14 - 18)  SpO2: 95% (25 May 2024 09:25) (94% - 98%)    Parameters below as of 25 May 2024 09:25  Patient On (Oxygen Delivery Method): room air    PHYSICAL EXAM:    dtr at bedside  Constitutional: No acute distress, non-toxic appearing  HEENT: unmasked, good phonation, not icteric  Neck: supple, no lymphadenopathy  Respiratory: clear to ascultation bilaterally, no wheezing  Cardiovascular: S1 and S2, regular rate and rhythm, no murmurs rubs or gallops  Gastrointestinal: soft, diffusely tender to deep palpation, softly distended, +bowel sounds, no rebound or guarding  Extremities: No peripheral edema, no cyanosis or clubbing  Vascular: 2+ peripheral pulses, no venous stasis  Neurological: A/O x 3, no focal deficits, no asterixis  Psychiatric: Normal mood, normal affect  Skin: No rashes, not jaundiced    LABS:                                       11.0   4.76  )-----------( 235      ( 25 May 2024 06:47 )             34.2     05-25    142  |  111<H>  |  10  ----------------------------<  114<H>  4.0   |  29  |  0.62    Ca    8.8      25 May 2024 06:47    TPro  7.1  /  Alb  3.3  /  TBili  0.9  /  DBili  x   /  AST  20  /  ALT  18  /  AlkPhos  95  05-24

## 2024-05-25 NOTE — PROGRESS NOTE ADULT - ASSESSMENT
73F w/ PMH of pancreatic cancer local mets, on radiation therapy, from [facility], CAD, stents, DMII, HLD p/w abdominal pain and back pain.     # h/o Pancreatic adenocarcinoma   - pt not on any active treatment for h/o pancreatic ca   - discussed with daughter at bedside  - CT abdomen- Edema adjacent to the pancreatic head and neck, similar to prior. Most likely postradiation changes. Superimposed mild acute or recurrent pancreatitis is possible.  - labs appropriate and stable, lipase 7  - plan to admit to focus on pain control - given dilaudid with improvement   - MRCP abdomen results pending   - Abdominal distention - recommend FLAT PLATE to evaluate for obstruction/ ileus now wtih opiates on board  - bowel regimen- last BM was yesterday but was constipated prior       will follow daily

## 2024-05-25 NOTE — DIETITIAN INITIAL EVALUATION ADULT - PERTINENT LABORATORY DATA
05-25    142  |  111<H>  |  10  ----------------------------<  114<H>  4.0   |  29  |  0.62    Ca    8.8      25 May 2024 06:47    TPro  7.1  /  Alb  3.3  /  TBili  0.9  /  DBili  x   /  AST  20  /  ALT  18  /  AlkPhos  95  05-24  POCT Blood Glucose.: 103 mg/dL (05-25-24 @ 08:23)  A1C with Estimated Average Glucose Result: 7.8 % (05-22-24 @ 06:07)

## 2024-05-25 NOTE — DIETITIAN INITIAL EVALUATION ADULT - ADD RECOMMEND
1) Liberalize diet to LOW FAT ONLY to maximize caloric and nutrient intake.  2) Add ensure max BID (provides 150kcal, 30g protein)  3) Monitor and optimize BG levels between 140-180 mg/dL by medical management  4) Monitor bowel movements, if no BM for >3 days, consider implementing STRONGER bowel regimen.  5) MVI w/ minerals daily to ensure 100% RDA met  6) Consider adding thiamine 100 mg daily 2/2 poor PO intake/ malnutrition  7) Encourage protein-rich foods, maximize food preferences  8) Monitor lytes/ min and replete prn.  9) Confirm goals of care regarding nutrition support  RD will continue to monitor PO intake, labs, hydration, and wt prn.

## 2024-05-25 NOTE — DIETITIAN INITIAL EVALUATION ADULT - OTHER INFO
73-year-old female presents to ED after recent discharge from [facility] to the hospital secondary to pancreatitis from likely radiation induced pancreatitis was home for a couple hours had severe abdominal pain consistent with pain she was having in the hospital took her OxyContin dose pain was not controlled daughter became concerned when patient was in severe pain at my evaluation patient describes describes epigastric constant sharp abdominal pain no nausea vomiting no fevers.  Admit for decurrent abd pain suspect recurrent pancreatic cancer, dx'd w/ pancreatitis x 1 week ago     Known to nutr services and dx'd w/ mal on 5/22/24; still meets criteria. W/ T2DM - POCTs WNL x 24 hrs (103, 160, 129) and hgb A1C of 7.8% on 5/22/24 - good glycemic control for age. Given 5 units of lantus and no corrective insulin x 24 hrs noted. Currently on PO and IV Dilaudid (likely contributing to persistent constipation) - daughter reports no BM x 2 days. Currently on miralax - Monitor bowel movements, if no BM for >3 days, consider implementing STRONGER bowel regimen. Obtained all information from daughter at bed-side; reports continued poor PO intake since admit (x 1 day). Reports UBW of ~118# x 1 mo ago ; bed scale wt of 114# taken by RD on 5/25/24. Wt hx as per EMR: 108# (bed scale wt taken by RD on 5/22/24). Unintentional wt loss of 4# / 3.4% wt loss x 1 mo ; not clinically significant. NFPE reveals severe muscle/ fat wasting - appears thin, frail, and latrice. Liberalize diet to LOW FAT ONLY to maximize caloric and nutrient intake (dx'd w/ pancreatitis x 1 week ago as per daughter). Add ensure max BID (provides 150kcal, 30g protein) to optimize nutrition. RD provided verbal nutrition education on low fat diet and went over menu choices w/ daughter for high protein, low-fat choices - daughter is receptive. See below for other recommendations.

## 2024-05-25 NOTE — DIETITIAN INITIAL EVALUATION ADULT - LITERATURE/VIDEOS GIVEN
Pt unable to verbalize understanding; however daughter met goals/outcomes in terms of low fat verbal nutrition education

## 2024-05-26 LAB
ANION GAP SERPL CALC-SCNC: 7 MMOL/L — SIGNIFICANT CHANGE UP (ref 5–17)
BUN SERPL-MCNC: 9 MG/DL — SIGNIFICANT CHANGE UP (ref 7–23)
CALCIUM SERPL-MCNC: 8.6 MG/DL — SIGNIFICANT CHANGE UP (ref 8.5–10.1)
CHLORIDE SERPL-SCNC: 111 MMOL/L — HIGH (ref 96–108)
CO2 SERPL-SCNC: 23 MMOL/L — SIGNIFICANT CHANGE UP (ref 22–31)
CREAT SERPL-MCNC: 0.68 MG/DL — SIGNIFICANT CHANGE UP (ref 0.5–1.3)
EGFR: 92 ML/MIN/1.73M2 — SIGNIFICANT CHANGE UP
GLUCOSE BLDC GLUCOMTR-MCNC: 134 MG/DL — HIGH (ref 70–99)
GLUCOSE BLDC GLUCOMTR-MCNC: 146 MG/DL — HIGH (ref 70–99)
GLUCOSE BLDC GLUCOMTR-MCNC: 77 MG/DL — SIGNIFICANT CHANGE UP (ref 70–99)
GLUCOSE BLDC GLUCOMTR-MCNC: 79 MG/DL — SIGNIFICANT CHANGE UP (ref 70–99)
GLUCOSE SERPL-MCNC: 91 MG/DL — SIGNIFICANT CHANGE UP (ref 70–99)
HCT VFR BLD CALC: 33.7 % — LOW (ref 34.5–45)
HGB BLD-MCNC: 10.7 G/DL — LOW (ref 11.5–15.5)
MCHC RBC-ENTMCNC: 28.7 PG — SIGNIFICANT CHANGE UP (ref 27–34)
MCHC RBC-ENTMCNC: 31.8 GM/DL — LOW (ref 32–36)
MCV RBC AUTO: 90.3 FL — SIGNIFICANT CHANGE UP (ref 80–100)
PLATELET # BLD AUTO: 233 K/UL — SIGNIFICANT CHANGE UP (ref 150–400)
POTASSIUM SERPL-MCNC: 3.7 MMOL/L — SIGNIFICANT CHANGE UP (ref 3.5–5.3)
POTASSIUM SERPL-SCNC: 3.7 MMOL/L — SIGNIFICANT CHANGE UP (ref 3.5–5.3)
RBC # BLD: 3.73 M/UL — LOW (ref 3.8–5.2)
RBC # FLD: 13.2 % — SIGNIFICANT CHANGE UP (ref 10.3–14.5)
SODIUM SERPL-SCNC: 141 MMOL/L — SIGNIFICANT CHANGE UP (ref 135–145)
WBC # BLD: 5.54 K/UL — SIGNIFICANT CHANGE UP (ref 3.8–10.5)
WBC # FLD AUTO: 5.54 K/UL — SIGNIFICANT CHANGE UP (ref 3.8–10.5)

## 2024-05-26 PROCEDURE — 99233 SBSQ HOSP IP/OBS HIGH 50: CPT

## 2024-05-26 RX ORDER — SIMETHICONE 80 MG/1
80 TABLET, CHEWABLE ORAL THREE TIMES A DAY
Refills: 0 | Status: DISCONTINUED | OUTPATIENT
Start: 2024-05-26 | End: 2024-06-05

## 2024-05-26 RX ORDER — METOCLOPRAMIDE HCL 10 MG
5 TABLET ORAL THREE TIMES A DAY
Refills: 0 | Status: COMPLETED | OUTPATIENT
Start: 2024-05-26 | End: 2024-05-28

## 2024-05-26 RX ADMIN — SIMETHICONE 80 MILLIGRAM(S): 80 TABLET, CHEWABLE ORAL at 14:03

## 2024-05-26 RX ADMIN — Medication 1 GRAM(S): at 21:35

## 2024-05-26 RX ADMIN — SODIUM CHLORIDE 125 MILLILITER(S): 9 INJECTION INTRAMUSCULAR; INTRAVENOUS; SUBCUTANEOUS at 14:04

## 2024-05-26 RX ADMIN — Medication 5 MILLIGRAM(S): at 21:34

## 2024-05-26 RX ADMIN — CLOPIDOGREL BISULFATE 75 MILLIGRAM(S): 75 TABLET, FILM COATED ORAL at 09:38

## 2024-05-26 RX ADMIN — Medication 1 GRAM(S): at 09:38

## 2024-05-26 RX ADMIN — HYDROMORPHONE HYDROCHLORIDE 0.5 MILLIGRAM(S): 2 INJECTION INTRAMUSCULAR; INTRAVENOUS; SUBCUTANEOUS at 01:04

## 2024-05-26 RX ADMIN — POLYETHYLENE GLYCOL 3350 17 GRAM(S): 17 POWDER, FOR SOLUTION ORAL at 09:38

## 2024-05-26 RX ADMIN — POLYETHYLENE GLYCOL 3350 17 GRAM(S): 17 POWDER, FOR SOLUTION ORAL at 21:35

## 2024-05-26 RX ADMIN — SODIUM CHLORIDE 125 MILLILITER(S): 9 INJECTION INTRAMUSCULAR; INTRAVENOUS; SUBCUTANEOUS at 21:50

## 2024-05-26 RX ADMIN — HYDROMORPHONE HYDROCHLORIDE 0.5 MILLIGRAM(S): 2 INJECTION INTRAMUSCULAR; INTRAVENOUS; SUBCUTANEOUS at 19:34

## 2024-05-26 RX ADMIN — PANTOPRAZOLE SODIUM 40 MILLIGRAM(S): 20 TABLET, DELAYED RELEASE ORAL at 21:34

## 2024-05-26 RX ADMIN — SODIUM CHLORIDE 125 MILLILITER(S): 9 INJECTION INTRAMUSCULAR; INTRAVENOUS; SUBCUTANEOUS at 05:39

## 2024-05-26 RX ADMIN — HYDROMORPHONE HYDROCHLORIDE 0.5 MILLIGRAM(S): 2 INJECTION INTRAMUSCULAR; INTRAVENOUS; SUBCUTANEOUS at 14:46

## 2024-05-26 RX ADMIN — HYDROMORPHONE HYDROCHLORIDE 0.5 MILLIGRAM(S): 2 INJECTION INTRAMUSCULAR; INTRAVENOUS; SUBCUTANEOUS at 13:44

## 2024-05-26 RX ADMIN — Medication 81 MILLIGRAM(S): at 21:34

## 2024-05-26 RX ADMIN — SENNA PLUS 2 TABLET(S): 8.6 TABLET ORAL at 21:35

## 2024-05-26 RX ADMIN — HYDROMORPHONE HYDROCHLORIDE 0.5 MILLIGRAM(S): 2 INJECTION INTRAMUSCULAR; INTRAVENOUS; SUBCUTANEOUS at 07:27

## 2024-05-26 RX ADMIN — Medication 5 MILLIGRAM(S): at 13:44

## 2024-05-26 RX ADMIN — PANTOPRAZOLE SODIUM 40 MILLIGRAM(S): 20 TABLET, DELAYED RELEASE ORAL at 09:39

## 2024-05-26 RX ADMIN — Medication 100 MILLIGRAM(S): at 05:36

## 2024-05-26 NOTE — PROGRESS NOTE ADULT - ASSESSMENT
73F w/ PMH of pancreatic cancer local mets, on radiation therapy, from [facility], CAD, stents, DMII, HLD p/w abdominal pain and back pain.     # h/o Pancreatic adenocarcinoma   - pt not on any active treatment for h/o pancreatic ca   - discussed with daughter at bedside  - recent MRI ABDOMEN suggestive of acute interstitial edema consistent with pancreatitis   - labs appropriate and stable, lipase 7  - continue with ongoing pain control   - Abdominal flat plat suggestive of mild ileus   - MRCP suggests area of abrupt narrowing yet may very well be sequalae of prior tx/rt. no evidence of disease recurrence or pancreatic mass        will follow daily

## 2024-05-26 NOTE — PROGRESS NOTE ADULT - ASSESSMENT
73 year old female with history pancreatic cancer s/p XRT 2022 hospitalized for pancreatitis, now with recurrent abdominal pain refractory to pain medication with on imaging prominent PD, edema adjacent to head/neck with normal LFTs and lipase, no leukocytosis.  MRCP shows pancreatitis and presumed stenosis of PD in neck with upstream dilation of the PD.    Rec:  ::Continue IV PPI for now  ::Stopped lyrica and muscle relaxer, can cause drowsiness and ileus  ::Defer to Dr. Raya if ERCP and PD stent would benefit patient, risks and benefits of doing vs not doing d/w dtr  ::Start clears  ::Give low dose reglan x 2 days to help ileus/nausea; ambulate; limit narcotics as able  d/w dtr at bedside

## 2024-05-26 NOTE — PROGRESS NOTE ADULT - ASSESSMENT
73-year-old female presents to ED after recent discharge from [facility] to the hospital secondary to pancreatitis from likely radiation induced pancreatitis was home for a couple hours had severe abdominal pain consistent with pain she was having in the hospital took her OxyContin dose pain was not controlled daughter became concerned when patient was in severe pain at my evaluation patient describes describes epigastric constant sharp abdominal pain no nausea vomiting no fevers . Had CT few days ago another one was not ordered. Pt on no treatment for her Pa cancer, for local mets received RXT. Adm for further work up including r/o active cancer. RXT was a year ago.    * Recurrent abd pain, acute pancreatitis and ileus  GI consult reviewed- mrcp report reviewed with acute pancreatitis  Dilaudid IV and PO-adjust to achieve pain control  -clears and AAT  -ondansetron prn  -bowel regimen  -PPI bid  -serial abd exams  -gi eval    * T2DM  -lantus 5 and sliding scale    * CAD stents  -aspirin , clopidogrel     PT eval: home    DVT proph:   	lovenox   Code Status: FULL code 	  dispo: discharge pending improvement in clinical status, advance diet and pain control, possible in the next 48-72 hrs    Outpatient follow-up:   with PMD

## 2024-05-26 NOTE — PROGRESS NOTE ADULT - SUBJECTIVE AND OBJECTIVE BOX
GI coverage Dr. Raya    73 year old woman with history pancreatic cancer s/p XRT 2022 recently hospitalized for pancreatitis, admitted with abdominal pain, pancreatitis.     abd pain persists, mid abdomen, rec'd pain meds recently so seems better  no vomiting, +nausea  chantale PO  passing gas and had small BM p dulcolax yest  dtr at bedside states lyrica is new med-wondering if needed  pt remains confused    REVIEW OF SYSTEMS:    CONSTITUTIONAL: No weakness, fevers or chills  EYES/ENT: No visual changes;  No vertigo or throat pain   NECK: No pain or stiffness  RESPIRATORY: No cough, wheezing, hemoptysis; No shortness of breath  CARDIOVASCULAR: No chest pain or palpitations  GASTROINTESTINAL: See HPI  GENITOURINARY: No dysuria, frequency or hematuria  NEUROLOGICAL: No numbness or weakness  SKIN: No itching, burning, rashes, or lesions   PSYCH: Normal mood and affect  All other review of systems is negative unless indicated above.    PHYSICAL EXAM:  Vital Signs Last 24 Hrs  T(C): 37.1 (26 May 2024 09:00), Max: 37.1 (26 May 2024 09:00)  T(F): 98.8 (26 May 2024 09:00), Max: 98.8 (26 May 2024 09:00)  HR: 75 (26 May 2024 09:00) (68 - 76)  BP: 142/58 (26 May 2024 09:00) (119/48 - 168/56)  BP(mean): --  RR: 17 (26 May 2024 09:00) (17 - 18)  SpO2: 94% (26 May 2024 09:00) (92% - 97%)    Parameters below as of 26 May 2024 09:00  Patient On (Oxygen Delivery Method): room air    dtr at bedside  Constitutional: No acute distress, non-toxic appearing  HEENT: good phonation, not icteric  Neck: supple, no lymphadenopathy  Respiratory: clear to ascultation bilaterally, no wheezing  Cardiovascular: S1 and S2, regular rate and rhythm, no murmurs rubs or gallops  Gastrointestinal: soft, diffusely tender to deep palpation, softly distended, +bowel sounds, no rebound or guarding  Extremities: No peripheral edema, no cyanosis or clubbing  Vascular: 2+ peripheral pulses, no venous stasis  Neurological: A/O x 3, no focal deficits, no asterixis  Psychiatric: Normal mood, normal affect  Skin: No rashes, not jaundiced    LABS:                        10.7   5.54  )-----------( 233      ( 26 May 2024 07:08 )             33.7            ACC: [id number] EXAM:  MR MRCP WAW IC   ORDERED BY: YANA MONTALVO     PROCEDURE DATE:  05/24/2024          INTERPRETATION:  CLINICAL INFORMATION: Evaluate for pancreatic cancer.   Persistent pancreatitis. Radiation for pancreatic cancer metastases one   year ago.    COMPARISON: 5/25/2022 and 5/22/2024    CONTRAST/COMPLICATIONS:  IV Contrast: Gadavist  5 cc administered   2.5 cc discarded  Oral Contrast: NONE  Complications: None reported at time of study completion    PROCEDURE:  MRI of the abdomen was performed.  MRCP was performed.    FINDINGS:  LOWER CHEST: Within normal limits.    LIVER: No suspicious liver lesions.  BILE DUCTS: Normal caliber.  GALLBLADDER: Within normal limits.  SPLEEN: Within normal limits.  PANCREAS: Mildly increased T2 in the pancreatic head, body, and tail with   trace amount of free fluid surrounding the tail, uncinate process, and   duodenal sweep, consistent with acute interstitial edematous   pancreatitis. No evidence of pancreatic necrosis or drainable   peripancreatic fluid collection.    Ductal dilatation in the pancreatic body and tail with termination of the   pancreatic duct at the level of the pancreatic neck, where narrowing of   the pancreatic parenchyma and irregular region of T2 signal hypointensity   is present, without evidence of focal restricted diffusion likely   posttreatment change related to prior radiation.    ADRENALS: Within normal limits.  KIDNEYS/URETERS: Right upper lobe pole renal cyst measuring 2.4 and 1.0   cm, respectively. Left extrarenal pelvis. No hydronephrosis.    VISUALIZED PORTIONS:  BOWEL: Within normal limits.  PERITONEUM: No ascites.  VESSELS: Narrowing of the main portal vein as it passes adjacent to the   pancreatic neck. No evidence of portal or mesenteric venous thrombosis.  RETROPERITONEUM/LYMPH NODES: No lymphadenopathy.  ABDOMINAL WALL: Within normal limits.  BONES: Within normal limits.    IMPRESSION:  Acute interstitial edematous pancreatitis. No evidence of pancreatic   necrosis or drainable peripancreatic fluid collection.    Narrowing of the pancreatic neck with abrupt termination/stricture of the   pancreatic duct with surrounding signal changes, likely posttreatment   change. No definitive evidence of pancreatic mass.            --- End of Report ---            APOLLO ANDERSON MD; Attending Radiologist  This document has been electronically signed. May 25 2024  3:58PM

## 2024-05-26 NOTE — PROGRESS NOTE ADULT - SUBJECTIVE AND OBJECTIVE BOX
no overnight events. doing well overall. pain is under better control. has been passing gas which helps her distention and pain a lot. no bowel movement since thurs.  daughter at bedside.  she wants to try clear liquid diet    Physical Exam  T(C): 37.1 (05-26-24 @ 09:00), Max: 37.1 (05-26-24 @ 09:00)  HR: 75 (05-26-24 @ 09:00) (68 - 76)  BP: 142/58 (05-26-24 @ 09:00) (119/48 - 168/56)  RR: 17 (05-26-24 @ 09:00) (17 - 18)  SpO2: 94% (05-26-24 @ 09:00) (92% - 97%)  General Appearance nad, no head trauma.   EYES no scleral icterus.   CHEST clear to auscultation bilaterally, no wheezes/rhonchi/rales.   ABDOMEN soft mild tenderness periumbilica, moderate distention, BS present.   EXTREMITIES no clubbing, no cyanosis, no edema.   Skin normal skin, limited exam.   Psych normal affect.     Pertinent Labs/Imaging:  hgb 10.7

## 2024-05-26 NOTE — PROGRESS NOTE ADULT - SUBJECTIVE AND OBJECTIVE BOX
INTERVAL HPI/OVERNIGHT EVENTS:  Patient S&E at bedside. No o/n events,   patient continues to have intermittent abdominal pain.     VITAL SIGNS:  T(F): 98.8 (05-26-24 @ 09:00)  HR: 75 (05-26-24 @ 09:00)  BP: 142/58 (05-26-24 @ 09:00)  RR: 17 (05-26-24 @ 09:00)  SpO2: 94% (05-26-24 @ 09:00)  Wt(kg): --    PHYSICAL EXAM:    Constitutional: NAD  Eyes: EOMI, sclera non-icteric  Neck: supple, no masses, no JVD  Respiratory: CTA b/l, good air entry b/l  Cardiovascular: RRR, no M/R/G  Gastrointestinal: soft, NTND, epigastric tenderness  Extremities: no c/c/e  Neurological: AAOx3      MEDICATIONS  (STANDING):  aspirin enteric coated 81 milliGRAM(s) Oral daily  clopidogrel Tablet 75 milliGRAM(s) Oral daily  dextrose 10% Bolus 125 milliLiter(s) IV Bolus once  dextrose 5%. 1000 milliLiter(s) (100 mL/Hr) IV Continuous <Continuous>  dextrose 5%. 1000 milliLiter(s) (50 mL/Hr) IV Continuous <Continuous>  dextrose 50% Injectable 12.5 Gram(s) IV Push once  dextrose 50% Injectable 25 Gram(s) IV Push once  enoxaparin Injectable 40 milliGRAM(s) SubCutaneous every 24 hours  glucagon  Injectable 1 milliGRAM(s) IntraMuscular once  insulin glargine Injectable (LANTUS) 5 Unit(s) SubCutaneous at bedtime  insulin lispro (ADMELOG) corrective regimen sliding scale   SubCutaneous three times a day before meals  metoclopramide Injectable 5 milliGRAM(s) IV Push three times a day  mineral oil enema 133 milliLiter(s) Rectal once  pantoprazole  Injectable 40 milliGRAM(s) IV Push two times a day  polyethylene glycol 3350 17 Gram(s) Oral two times a day  senna 2 Tablet(s) Oral at bedtime  sodium chloride 0.9%. 1000 milliLiter(s) (125 mL/Hr) IV Continuous <Continuous>  sucralfate suspension 1 Gram(s) Oral two times a day    MEDICATIONS  (PRN):  acetaminophen     Tablet .. 650 milliGRAM(s) Oral every 6 hours PRN Temp greater or equal to 38C (100.4F), Mild Pain (1 - 3)  aluminum hydroxide/magnesium hydroxide/simethicone Suspension 30 milliLiter(s) Oral every 4 hours PRN Dyspepsia  bisacodyl Suppository 10 milliGRAM(s) Rectal daily PRN Constipation  dextrose Oral Gel 15 Gram(s) Oral once PRN Blood Glucose LESS THAN 70 milliGRAM(s)/deciliter  HYDROmorphone   Tablet 2 milliGRAM(s) Oral every 3 hours PRN Moderate Pain (4 - 6)  HYDROmorphone  Injectable 0.5 milliGRAM(s) IV Push every 2 hours PRN Severe Pain (7 - 10)  melatonin 3 milliGRAM(s) Oral at bedtime PRN Insomnia  ondansetron Injectable 4 milliGRAM(s) IV Push every 8 hours PRN Nausea and/or Vomiting  simethicone 80 milliGRAM(s) Chew three times a day PRN Gas      Allergies    IV Contrast (Short breath)    Intolerances        LABS:                        10.7   5.54  )-----------( 233      ( 26 May 2024 07:08 )             33.7     05-26    141  |  111<H>  |  9   ----------------------------<  91  3.7   |  23  |  0.68    Ca    8.6      26 May 2024 07:08        Urinalysis Basic - ( 26 May 2024 07:08 )    Color: x / Appearance: x / SG: x / pH: x  Gluc: 91 mg/dL / Ketone: x  / Bili: x / Urobili: x   Blood: x / Protein: x / Nitrite: x   Leuk Esterase: x / RBC: x / WBC x   Sq Epi: x / Non Sq Epi: x / Bacteria: x        RADIOLOGY & ADDITIONAL TESTS:  Studies reviewed.    ASSESSMENT & PLAN:

## 2024-05-27 LAB
ANION GAP SERPL CALC-SCNC: 6 MMOL/L — SIGNIFICANT CHANGE UP (ref 5–17)
BUN SERPL-MCNC: 8 MG/DL — SIGNIFICANT CHANGE UP (ref 7–23)
CALCIUM SERPL-MCNC: 8.7 MG/DL — SIGNIFICANT CHANGE UP (ref 8.5–10.1)
CHLORIDE SERPL-SCNC: 112 MMOL/L — HIGH (ref 96–108)
CO2 SERPL-SCNC: 24 MMOL/L — SIGNIFICANT CHANGE UP (ref 22–31)
CREAT SERPL-MCNC: 0.66 MG/DL — SIGNIFICANT CHANGE UP (ref 0.5–1.3)
EGFR: 93 ML/MIN/1.73M2 — SIGNIFICANT CHANGE UP
GLUCOSE BLDC GLUCOMTR-MCNC: 115 MG/DL — HIGH (ref 70–99)
GLUCOSE BLDC GLUCOMTR-MCNC: 123 MG/DL — HIGH (ref 70–99)
GLUCOSE BLDC GLUCOMTR-MCNC: 161 MG/DL — HIGH (ref 70–99)
GLUCOSE BLDC GLUCOMTR-MCNC: 206 MG/DL — HIGH (ref 70–99)
GLUCOSE SERPL-MCNC: 127 MG/DL — HIGH (ref 70–99)
HCT VFR BLD CALC: 32.9 % — LOW (ref 34.5–45)
HGB BLD-MCNC: 10.7 G/DL — LOW (ref 11.5–15.5)
MAGNESIUM SERPL-MCNC: 2.2 MG/DL — SIGNIFICANT CHANGE UP (ref 1.6–2.6)
MCHC RBC-ENTMCNC: 28.8 PG — SIGNIFICANT CHANGE UP (ref 27–34)
MCHC RBC-ENTMCNC: 32.5 GM/DL — SIGNIFICANT CHANGE UP (ref 32–36)
MCV RBC AUTO: 88.7 FL — SIGNIFICANT CHANGE UP (ref 80–100)
PLATELET # BLD AUTO: 232 K/UL — SIGNIFICANT CHANGE UP (ref 150–400)
POTASSIUM SERPL-MCNC: 3.8 MMOL/L — SIGNIFICANT CHANGE UP (ref 3.5–5.3)
POTASSIUM SERPL-SCNC: 3.8 MMOL/L — SIGNIFICANT CHANGE UP (ref 3.5–5.3)
RBC # BLD: 3.71 M/UL — LOW (ref 3.8–5.2)
RBC # FLD: 13 % — SIGNIFICANT CHANGE UP (ref 10.3–14.5)
SODIUM SERPL-SCNC: 142 MMOL/L — SIGNIFICANT CHANGE UP (ref 135–145)
WBC # BLD: 4.17 K/UL — SIGNIFICANT CHANGE UP (ref 3.8–10.5)
WBC # FLD AUTO: 4.17 K/UL — SIGNIFICANT CHANGE UP (ref 3.8–10.5)

## 2024-05-27 PROCEDURE — 99233 SBSQ HOSP IP/OBS HIGH 50: CPT

## 2024-05-27 RX ORDER — HYDROMORPHONE HYDROCHLORIDE 2 MG/ML
1 INJECTION INTRAMUSCULAR; INTRAVENOUS; SUBCUTANEOUS ONCE
Refills: 0 | Status: DISCONTINUED | OUTPATIENT
Start: 2024-05-27 | End: 2024-05-27

## 2024-05-27 RX ORDER — MINERAL OIL
133 OIL (ML) MISCELLANEOUS DAILY
Refills: 0 | Status: COMPLETED | OUTPATIENT
Start: 2024-05-27 | End: 2024-05-29

## 2024-05-27 RX ORDER — HYDROMORPHONE HYDROCHLORIDE 2 MG/ML
4 INJECTION INTRAMUSCULAR; INTRAVENOUS; SUBCUTANEOUS EVERY 4 HOURS
Refills: 0 | Status: DISCONTINUED | OUTPATIENT
Start: 2024-05-27 | End: 2024-06-03

## 2024-05-27 RX ORDER — HYDROMORPHONE HYDROCHLORIDE 2 MG/ML
0.5 INJECTION INTRAMUSCULAR; INTRAVENOUS; SUBCUTANEOUS
Refills: 0 | Status: DISCONTINUED | OUTPATIENT
Start: 2024-05-27 | End: 2024-06-03

## 2024-05-27 RX ORDER — SENNA PLUS 8.6 MG/1
2 TABLET ORAL
Refills: 0 | Status: DISCONTINUED | OUTPATIENT
Start: 2024-05-27 | End: 2024-06-05

## 2024-05-27 RX ORDER — HYDROMORPHONE HYDROCHLORIDE 2 MG/ML
2 INJECTION INTRAMUSCULAR; INTRAVENOUS; SUBCUTANEOUS EVERY 4 HOURS
Refills: 0 | Status: DISCONTINUED | OUTPATIENT
Start: 2024-05-27 | End: 2024-05-28

## 2024-05-27 RX ADMIN — HYDROMORPHONE HYDROCHLORIDE 0.5 MILLIGRAM(S): 2 INJECTION INTRAMUSCULAR; INTRAVENOUS; SUBCUTANEOUS at 00:43

## 2024-05-27 RX ADMIN — SIMETHICONE 80 MILLIGRAM(S): 80 TABLET, CHEWABLE ORAL at 00:43

## 2024-05-27 RX ADMIN — HYDROMORPHONE HYDROCHLORIDE 0.5 MILLIGRAM(S): 2 INJECTION INTRAMUSCULAR; INTRAVENOUS; SUBCUTANEOUS at 22:03

## 2024-05-27 RX ADMIN — PANTOPRAZOLE SODIUM 40 MILLIGRAM(S): 20 TABLET, DELAYED RELEASE ORAL at 10:24

## 2024-05-27 RX ADMIN — Medication 5 MILLIGRAM(S): at 22:04

## 2024-05-27 RX ADMIN — SODIUM CHLORIDE 125 MILLILITER(S): 9 INJECTION INTRAMUSCULAR; INTRAVENOUS; SUBCUTANEOUS at 13:59

## 2024-05-27 RX ADMIN — Medication 5 MILLIGRAM(S): at 22:03

## 2024-05-27 RX ADMIN — INSULIN GLARGINE 5 UNIT(S): 100 INJECTION, SOLUTION SUBCUTANEOUS at 22:11

## 2024-05-27 RX ADMIN — SIMETHICONE 80 MILLIGRAM(S): 80 TABLET, CHEWABLE ORAL at 10:29

## 2024-05-27 RX ADMIN — HYDROMORPHONE HYDROCHLORIDE 0.5 MILLIGRAM(S): 2 INJECTION INTRAMUSCULAR; INTRAVENOUS; SUBCUTANEOUS at 11:07

## 2024-05-27 RX ADMIN — Medication 5 MILLIGRAM(S): at 13:58

## 2024-05-27 RX ADMIN — POLYETHYLENE GLYCOL 3350 17 GRAM(S): 17 POWDER, FOR SOLUTION ORAL at 10:24

## 2024-05-27 RX ADMIN — Medication 5 MILLIGRAM(S): at 05:26

## 2024-05-27 RX ADMIN — Medication 81 MILLIGRAM(S): at 22:03

## 2024-05-27 RX ADMIN — HYDROMORPHONE HYDROCHLORIDE 0.5 MILLIGRAM(S): 2 INJECTION INTRAMUSCULAR; INTRAVENOUS; SUBCUTANEOUS at 05:47

## 2024-05-27 RX ADMIN — HYDROMORPHONE HYDROCHLORIDE 1 MILLIGRAM(S): 2 INJECTION INTRAMUSCULAR; INTRAVENOUS; SUBCUTANEOUS at 12:15

## 2024-05-27 RX ADMIN — POLYETHYLENE GLYCOL 3350 17 GRAM(S): 17 POWDER, FOR SOLUTION ORAL at 22:04

## 2024-05-27 RX ADMIN — Medication 2: at 12:43

## 2024-05-27 RX ADMIN — PANTOPRAZOLE SODIUM 40 MILLIGRAM(S): 20 TABLET, DELAYED RELEASE ORAL at 22:04

## 2024-05-27 RX ADMIN — Medication 1 GRAM(S): at 10:25

## 2024-05-27 RX ADMIN — CLOPIDOGREL BISULFATE 75 MILLIGRAM(S): 75 TABLET, FILM COATED ORAL at 10:25

## 2024-05-27 RX ADMIN — HYDROMORPHONE HYDROCHLORIDE 0.5 MILLIGRAM(S): 2 INJECTION INTRAMUSCULAR; INTRAVENOUS; SUBCUTANEOUS at 22:18

## 2024-05-27 RX ADMIN — HYDROMORPHONE HYDROCHLORIDE 2 MILLIGRAM(S): 2 INJECTION INTRAMUSCULAR; INTRAVENOUS; SUBCUTANEOUS at 18:23

## 2024-05-27 RX ADMIN — SODIUM CHLORIDE 125 MILLILITER(S): 9 INJECTION INTRAMUSCULAR; INTRAVENOUS; SUBCUTANEOUS at 22:03

## 2024-05-27 RX ADMIN — Medication 5 MILLIGRAM(S): at 12:18

## 2024-05-27 RX ADMIN — SODIUM CHLORIDE 125 MILLILITER(S): 9 INJECTION INTRAMUSCULAR; INTRAVENOUS; SUBCUTANEOUS at 05:25

## 2024-05-27 RX ADMIN — Medication 1 GRAM(S): at 22:03

## 2024-05-27 RX ADMIN — HYDROMORPHONE HYDROCHLORIDE 2 MILLIGRAM(S): 2 INJECTION INTRAMUSCULAR; INTRAVENOUS; SUBCUTANEOUS at 14:02

## 2024-05-27 RX ADMIN — HYDROMORPHONE HYDROCHLORIDE 1 MILLIGRAM(S): 2 INJECTION INTRAMUSCULAR; INTRAVENOUS; SUBCUTANEOUS at 13:26

## 2024-05-27 RX ADMIN — HYDROMORPHONE HYDROCHLORIDE 0.5 MILLIGRAM(S): 2 INJECTION INTRAMUSCULAR; INTRAVENOUS; SUBCUTANEOUS at 12:15

## 2024-05-27 RX ADMIN — SENNA PLUS 2 TABLET(S): 8.6 TABLET ORAL at 22:04

## 2024-05-27 RX ADMIN — Medication 133 MILLILITER(S): at 14:03

## 2024-05-27 NOTE — PROGRESS NOTE ADULT - ASSESSMENT
73 year old female with history pancreatic cancer s/p XRT 2022 hospitalized for pancreatitis, now with recurrent abdominal pain refractory to pain medication with on imaging prominent PD, edema adjacent to head/neck with normal LFTs and lipase, no leukocytosis.  MRCP shows pancreatitis and presumed stenosis of PD in neck with upstream dilation of the PD.    Rec:  - Continue with PPI IVP  - Continue with bowel regimen: Miralax BID. If no bowel movements, consider mineral oil enema  - Limit narcotics and OOBTC/ambulation with assistance  - Clear liquids and if pain improves slow advancement to low fat diet  - Continue with IVF  - Defer to Dr. Raya if ERCP and PD stent would benefit patient, risks and benefits of doing vs not doing   - Dr. Raya to resume care on 5/28

## 2024-05-27 NOTE — PROGRESS NOTE ADULT - SUBJECTIVE AND OBJECTIVE BOX
Patient is a 73y old  Female who presents with a chief complaint of abd pain (26 May 2024 14:29)      Subective: Seen and examined at bedside. Complains of nausea and abdominal pain. Minimal bowel movements in past 7 days.       PAST MEDICAL & SURGICAL HISTORY:  Coronary artery disease involving native coronary artery of native heart with unstable angina pectoris  s/p stents x 20      Type 2 diabetes mellitus without complication, without long-term current use of insulin      Hyperlipidemia, unspecified hyperlipidemia type      S/P cardiac cath      H/O lithotripsy          MEDICATIONS  (STANDING):  aspirin enteric coated 81 milliGRAM(s) Oral daily  bisacodyl 5 milliGRAM(s) Oral at bedtime  clopidogrel Tablet 75 milliGRAM(s) Oral daily  dextrose 10% Bolus 125 milliLiter(s) IV Bolus once  dextrose 5%. 1000 milliLiter(s) (100 mL/Hr) IV Continuous <Continuous>  dextrose 5%. 1000 milliLiter(s) (50 mL/Hr) IV Continuous <Continuous>  dextrose 50% Injectable 12.5 Gram(s) IV Push once  dextrose 50% Injectable 25 Gram(s) IV Push once  enoxaparin Injectable 40 milliGRAM(s) SubCutaneous every 24 hours  glucagon  Injectable 1 milliGRAM(s) IntraMuscular once  insulin glargine Injectable (LANTUS) 5 Unit(s) SubCutaneous at bedtime  insulin lispro (ADMELOG) corrective regimen sliding scale   SubCutaneous three times a day before meals  metoclopramide Injectable 5 milliGRAM(s) IV Push three times a day  mineral oil enema 133 milliLiter(s) Rectal daily  pantoprazole  Injectable 40 milliGRAM(s) IV Push two times a day  polyethylene glycol 3350 17 Gram(s) Oral two times a day  senna 2 Tablet(s) Oral two times a day  sodium chloride 0.9%. 1000 milliLiter(s) (125 mL/Hr) IV Continuous <Continuous>  sucralfate suspension 1 Gram(s) Oral two times a day    MEDICATIONS  (PRN):  acetaminophen     Tablet .. 650 milliGRAM(s) Oral every 6 hours PRN Temp greater or equal to 38C (100.4F), Mild Pain (1 - 3)  aluminum hydroxide/magnesium hydroxide/simethicone Suspension 30 milliLiter(s) Oral every 4 hours PRN Dyspepsia  bisacodyl Suppository 10 milliGRAM(s) Rectal daily PRN Constipation  dextrose Oral Gel 15 Gram(s) Oral once PRN Blood Glucose LESS THAN 70 milliGRAM(s)/deciliter  HYDROmorphone   Tablet 4 milliGRAM(s) Oral every 4 hours PRN Severe Pain (7 - 10)  HYDROmorphone   Tablet 2 milliGRAM(s) Oral every 4 hours PRN Moderate Pain (4 - 6)  HYDROmorphone  Injectable 0.5 milliGRAM(s) IV Push every 2 hours PRN Severe Pain (7 - 10)  melatonin 3 milliGRAM(s) Oral at bedtime PRN Insomnia  ondansetron Injectable 4 milliGRAM(s) IV Push every 8 hours PRN Nausea and/or Vomiting  simethicone 80 milliGRAM(s) Chew three times a day PRN Gas      REVIEW OF SYSTEMS:    RESPIRATORY: No shortness of breath  CARDIOVASCULAR: No chest pain  All other review of systems is negative unless indicated above.    Vital Signs Last 24 Hrs  T(C): 36.9 (27 May 2024 08:44), Max: 37.2 (26 May 2024 19:37)  T(F): 98.4 (27 May 2024 08:44), Max: 98.9 (26 May 2024 19:37)  HR: 69 (27 May 2024 08:44) (69 - 74)  BP: 125/45 (27 May 2024 08:44) (125/45 - 161/52)  BP(mean): --  RR: 18 (27 May 2024 08:44) (18 - 18)  SpO2: 94% (27 May 2024 08:44) (94% - 96%)    Parameters below as of 27 May 2024 08:44  Patient On (Oxygen Delivery Method): room air        PHYSICAL EXAM:    Constitutional: moderate distress  Respiratory: CTAB  Cardiovascular: S1 and S2, RRR  Gastrointestinal: BS+, soft, ttp of epigastric/mid abdominal region  Extremities: No peripheral edema  Psychiatric: Normal mood, normal affect    LABS:                        10.7   4.17  )-----------( 232      ( 27 May 2024 05:54 )             32.9     05-27    142  |  112<H>  |  8   ----------------------------<  127<H>  3.8   |  24  |  0.66    Ca    8.7      27 May 2024 05:54  Mg     2.2     05-27            RADIOLOGY & ADDITIONAL STUDIES:

## 2024-05-27 NOTE — PROGRESS NOTE ADULT - ASSESSMENT
73F w/ PMH of pancreatic cancer local mets, on radiation therapy, from [facility], CAD, stents, DMII, HLD p/w abdominal pain and back pain.     # h/o Pancreatic adenocarcinoma   - pt not on any active treatment for h/o pancreatic ca   - discussed with daughter at bedside  - recent MRI ABDOMEN suggestive of acute interstitial edema consistent with pancreatitis NO evidence of recurrent disease NO pancreatic mass noted.   - labs appropriate and stable, lipase 7  - continue with ongoing pain control   - discussed with patient that it would best to take PO meds and avoid waiting until pain is 10/10   - Abdominal flat plat suggestive of mild ileus   - MRCP suggests area of abrupt narrowing yet may very well be sequalae of prior tx/rt. no evidence of disease recurrence or pancreatic mass  - continue with IVF       will follow daily

## 2024-05-27 NOTE — PROGRESS NOTE ADULT - SUBJECTIVE AND OBJECTIVE BOX
INTERVAL HPI/OVERNIGHT EVENTS:  Patient S&E at bedside. No o/n events, patient continues to report ongoing epigastric pain    VITAL SIGNS:  T(F): 98.4 (05-27-24 @ 08:44)  HR: 69 (05-27-24 @ 08:44)  BP: 125/45 (05-27-24 @ 08:44)  RR: 18 (05-27-24 @ 08:44)  SpO2: 94% (05-27-24 @ 08:44)  Wt(kg): --    PHYSICAL EXAM:    Constitutional: NAD  Eyes: EOMI, sclera non-icteric  Respiratory: CTA b/l, good air entry b/l, unlabored breathing   Cardiovascular: RRR, no M/R/G  Gastrointestinal: soft, + Epigastric tenderness   Extremities: no c/c/e  Neurological: AAOx3      MEDICATIONS  (STANDING):  aspirin enteric coated 81 milliGRAM(s) Oral daily  bisacodyl 5 milliGRAM(s) Oral at bedtime  clopidogrel Tablet 75 milliGRAM(s) Oral daily  dextrose 10% Bolus 125 milliLiter(s) IV Bolus once  dextrose 5%. 1000 milliLiter(s) (100 mL/Hr) IV Continuous <Continuous>  dextrose 5%. 1000 milliLiter(s) (50 mL/Hr) IV Continuous <Continuous>  dextrose 50% Injectable 12.5 Gram(s) IV Push once  dextrose 50% Injectable 25 Gram(s) IV Push once  enoxaparin Injectable 40 milliGRAM(s) SubCutaneous every 24 hours  glucagon  Injectable 1 milliGRAM(s) IntraMuscular once  insulin glargine Injectable (LANTUS) 5 Unit(s) SubCutaneous at bedtime  insulin lispro (ADMELOG) corrective regimen sliding scale   SubCutaneous three times a day before meals  metoclopramide Injectable 5 milliGRAM(s) IV Push three times a day  mineral oil enema 133 milliLiter(s) Rectal daily  pantoprazole  Injectable 40 milliGRAM(s) IV Push two times a day  polyethylene glycol 3350 17 Gram(s) Oral two times a day  senna 2 Tablet(s) Oral two times a day  sodium chloride 0.9%. 1000 milliLiter(s) (125 mL/Hr) IV Continuous <Continuous>  sucralfate suspension 1 Gram(s) Oral two times a day    MEDICATIONS  (PRN):  acetaminophen     Tablet .. 650 milliGRAM(s) Oral every 6 hours PRN Temp greater or equal to 38C (100.4F), Mild Pain (1 - 3)  aluminum hydroxide/magnesium hydroxide/simethicone Suspension 30 milliLiter(s) Oral every 4 hours PRN Dyspepsia  bisacodyl Suppository 10 milliGRAM(s) Rectal daily PRN Constipation  dextrose Oral Gel 15 Gram(s) Oral once PRN Blood Glucose LESS THAN 70 milliGRAM(s)/deciliter  HYDROmorphone   Tablet 4 milliGRAM(s) Oral every 4 hours PRN Severe Pain (7 - 10)  HYDROmorphone   Tablet 2 milliGRAM(s) Oral every 4 hours PRN Moderate Pain (4 - 6)  HYDROmorphone  Injectable 0.5 milliGRAM(s) IV Push every 3 hours PRN breakthrough pain  melatonin 3 milliGRAM(s) Oral at bedtime PRN Insomnia  ondansetron Injectable 4 milliGRAM(s) IV Push every 8 hours PRN Nausea and/or Vomiting  simethicone 80 milliGRAM(s) Chew three times a day PRN Gas      Allergies    IV Contrast (Short breath)    Intolerances        LABS:                        10.7   4.17  )-----------( 232      ( 27 May 2024 05:54 )             32.9     05-27    142  |  112<H>  |  8   ----------------------------<  127<H>  3.8   |  24  |  0.66    Ca    8.7      27 May 2024 05:54  Mg     2.2     05-27        Urinalysis Basic - ( 27 May 2024 05:54 )    Color: x / Appearance: x / SG: x / pH: x  Gluc: 127 mg/dL / Ketone: x  / Bili: x / Urobili: x   Blood: x / Protein: x / Nitrite: x   Leuk Esterase: x / RBC: x / WBC x   Sq Epi: x / Non Sq Epi: x / Bacteria: x        RADIOLOGY & ADDITIONAL TESTS:  Studies reviewed.    ASSESSMENT & PLAN:

## 2024-05-27 NOTE — PROGRESS NOTE ADULT - SUBJECTIVE AND OBJECTIVE BOX
continues to be in significant pain despite recently receiving iv push of dilaudid.   epigastric pain. she still hasnt had a bowel movement but is passing gas.   daughter at bedside    Physical Exam  T(C): 36.9 (05-27-24 @ 08:44), Max: 37.2 (05-26-24 @ 19:37)  HR: 69 (05-27-24 @ 08:44) (69 - 74)  BP: 125/45 (05-27-24 @ 08:44) (125/45 - 161/52)  RR: 18 (05-27-24 @ 08:44) (18 - 18)  SpO2: 94% (05-27-24 @ 08:44) (94% - 96%)  General Appearance very uncomfortable, no head trauma.   EYES no scleral icterus.   ABDOMEN soft tedner epigastric  EXTREMITIES no clubbing, no cyanosis, no edema.     Pertinent Labs/Imaging:  hgb 10.7  bicarb 24

## 2024-05-28 LAB
ANION GAP SERPL CALC-SCNC: 4 MMOL/L — LOW (ref 5–17)
BUN SERPL-MCNC: 5 MG/DL — LOW (ref 7–23)
CALCIUM SERPL-MCNC: 8.4 MG/DL — LOW (ref 8.5–10.1)
CHLORIDE SERPL-SCNC: 116 MMOL/L — HIGH (ref 96–108)
CO2 SERPL-SCNC: 25 MMOL/L — SIGNIFICANT CHANGE UP (ref 22–31)
CREAT SERPL-MCNC: 0.51 MG/DL — SIGNIFICANT CHANGE UP (ref 0.5–1.3)
EGFR: 98 ML/MIN/1.73M2 — SIGNIFICANT CHANGE UP
GLUCOSE BLDC GLUCOMTR-MCNC: 111 MG/DL — HIGH (ref 70–99)
GLUCOSE BLDC GLUCOMTR-MCNC: 112 MG/DL — HIGH (ref 70–99)
GLUCOSE BLDC GLUCOMTR-MCNC: 123 MG/DL — HIGH (ref 70–99)
GLUCOSE BLDC GLUCOMTR-MCNC: 128 MG/DL — HIGH (ref 70–99)
GLUCOSE SERPL-MCNC: 124 MG/DL — HIGH (ref 70–99)
HCT VFR BLD CALC: 30.2 % — LOW (ref 34.5–45)
HGB BLD-MCNC: 10.1 G/DL — LOW (ref 11.5–15.5)
MAGNESIUM SERPL-MCNC: 2.1 MG/DL — SIGNIFICANT CHANGE UP (ref 1.6–2.6)
MCHC RBC-ENTMCNC: 29.5 PG — SIGNIFICANT CHANGE UP (ref 27–34)
MCHC RBC-ENTMCNC: 33.4 GM/DL — SIGNIFICANT CHANGE UP (ref 32–36)
MCV RBC AUTO: 88.3 FL — SIGNIFICANT CHANGE UP (ref 80–100)
PLATELET # BLD AUTO: 231 K/UL — SIGNIFICANT CHANGE UP (ref 150–400)
POTASSIUM SERPL-MCNC: 3.3 MMOL/L — LOW (ref 3.5–5.3)
POTASSIUM SERPL-SCNC: 3.3 MMOL/L — LOW (ref 3.5–5.3)
RBC # BLD: 3.42 M/UL — LOW (ref 3.8–5.2)
RBC # FLD: 13.1 % — SIGNIFICANT CHANGE UP (ref 10.3–14.5)
SODIUM SERPL-SCNC: 145 MMOL/L — SIGNIFICANT CHANGE UP (ref 135–145)
WBC # BLD: 3.76 K/UL — LOW (ref 3.8–10.5)
WBC # FLD AUTO: 3.76 K/UL — LOW (ref 3.8–10.5)

## 2024-05-28 PROCEDURE — 99232 SBSQ HOSP IP/OBS MODERATE 35: CPT

## 2024-05-28 PROCEDURE — 99233 SBSQ HOSP IP/OBS HIGH 50: CPT

## 2024-05-28 RX ORDER — SOD SULF/SODIUM/NAHCO3/KCL/PEG
1 SOLUTION, RECONSTITUTED, ORAL ORAL ONCE
Refills: 0 | Status: COMPLETED | OUTPATIENT
Start: 2024-05-28 | End: 2024-05-28

## 2024-05-28 RX ORDER — POTASSIUM CHLORIDE 20 MEQ
40 PACKET (EA) ORAL EVERY 4 HOURS
Refills: 0 | Status: COMPLETED | OUTPATIENT
Start: 2024-05-28 | End: 2024-05-28

## 2024-05-28 RX ORDER — NYSTATIN 500MM UNIT
500000 POWDER (EA) MISCELLANEOUS
Refills: 0 | Status: DISCONTINUED | OUTPATIENT
Start: 2024-05-28 | End: 2024-06-05

## 2024-05-28 RX ADMIN — ONDANSETRON 4 MILLIGRAM(S): 8 TABLET, FILM COATED ORAL at 18:33

## 2024-05-28 RX ADMIN — HYDROMORPHONE HYDROCHLORIDE 4 MILLIGRAM(S): 2 INJECTION INTRAMUSCULAR; INTRAVENOUS; SUBCUTANEOUS at 23:53

## 2024-05-28 RX ADMIN — SODIUM CHLORIDE 125 MILLILITER(S): 9 INJECTION INTRAMUSCULAR; INTRAVENOUS; SUBCUTANEOUS at 20:29

## 2024-05-28 RX ADMIN — HYDROMORPHONE HYDROCHLORIDE 4 MILLIGRAM(S): 2 INJECTION INTRAMUSCULAR; INTRAVENOUS; SUBCUTANEOUS at 18:33

## 2024-05-28 RX ADMIN — CLOPIDOGREL BISULFATE 75 MILLIGRAM(S): 75 TABLET, FILM COATED ORAL at 10:12

## 2024-05-28 RX ADMIN — HYDROMORPHONE HYDROCHLORIDE 4 MILLIGRAM(S): 2 INJECTION INTRAMUSCULAR; INTRAVENOUS; SUBCUTANEOUS at 10:11

## 2024-05-28 RX ADMIN — Medication 500000 UNIT(S): at 12:20

## 2024-05-28 RX ADMIN — POLYETHYLENE GLYCOL 3350 17 GRAM(S): 17 POWDER, FOR SOLUTION ORAL at 21:31

## 2024-05-28 RX ADMIN — Medication 40 MILLIEQUIVALENT(S): at 10:11

## 2024-05-28 RX ADMIN — HYDROMORPHONE HYDROCHLORIDE 2 MILLIGRAM(S): 2 INJECTION INTRAMUSCULAR; INTRAVENOUS; SUBCUTANEOUS at 00:46

## 2024-05-28 RX ADMIN — Medication 1 GRAM(S): at 21:31

## 2024-05-28 RX ADMIN — Medication 81 MILLIGRAM(S): at 21:31

## 2024-05-28 RX ADMIN — Medication 1 LITER(S): at 16:35

## 2024-05-28 RX ADMIN — HYDROMORPHONE HYDROCHLORIDE 4 MILLIGRAM(S): 2 INJECTION INTRAMUSCULAR; INTRAVENOUS; SUBCUTANEOUS at 11:00

## 2024-05-28 RX ADMIN — HYDROMORPHONE HYDROCHLORIDE 4 MILLIGRAM(S): 2 INJECTION INTRAMUSCULAR; INTRAVENOUS; SUBCUTANEOUS at 14:12

## 2024-05-28 RX ADMIN — SENNA PLUS 2 TABLET(S): 8.6 TABLET ORAL at 10:11

## 2024-05-28 RX ADMIN — HYDROMORPHONE HYDROCHLORIDE 4 MILLIGRAM(S): 2 INJECTION INTRAMUSCULAR; INTRAVENOUS; SUBCUTANEOUS at 05:00

## 2024-05-28 RX ADMIN — PANTOPRAZOLE SODIUM 40 MILLIGRAM(S): 20 TABLET, DELAYED RELEASE ORAL at 21:30

## 2024-05-28 RX ADMIN — Medication 500000 UNIT(S): at 21:30

## 2024-05-28 RX ADMIN — PANTOPRAZOLE SODIUM 40 MILLIGRAM(S): 20 TABLET, DELAYED RELEASE ORAL at 10:12

## 2024-05-28 RX ADMIN — SENNA PLUS 2 TABLET(S): 8.6 TABLET ORAL at 21:31

## 2024-05-28 RX ADMIN — Medication 5 MILLIGRAM(S): at 04:32

## 2024-05-28 RX ADMIN — SODIUM CHLORIDE 125 MILLILITER(S): 9 INJECTION INTRAMUSCULAR; INTRAVENOUS; SUBCUTANEOUS at 04:31

## 2024-05-28 RX ADMIN — Medication 5 MILLIGRAM(S): at 21:31

## 2024-05-28 RX ADMIN — HYDROMORPHONE HYDROCHLORIDE 0.5 MILLIGRAM(S): 2 INJECTION INTRAMUSCULAR; INTRAVENOUS; SUBCUTANEOUS at 02:07

## 2024-05-28 RX ADMIN — Medication 40 MILLIEQUIVALENT(S): at 14:12

## 2024-05-28 RX ADMIN — Medication 133 MILLILITER(S): at 12:20

## 2024-05-28 RX ADMIN — HYDROMORPHONE HYDROCHLORIDE 0.5 MILLIGRAM(S): 2 INJECTION INTRAMUSCULAR; INTRAVENOUS; SUBCUTANEOUS at 02:20

## 2024-05-28 RX ADMIN — HYDROMORPHONE HYDROCHLORIDE 4 MILLIGRAM(S): 2 INJECTION INTRAMUSCULAR; INTRAVENOUS; SUBCUTANEOUS at 04:30

## 2024-05-28 RX ADMIN — HYDROMORPHONE HYDROCHLORIDE 4 MILLIGRAM(S): 2 INJECTION INTRAMUSCULAR; INTRAVENOUS; SUBCUTANEOUS at 22:53

## 2024-05-28 RX ADMIN — HYDROMORPHONE HYDROCHLORIDE 2 MILLIGRAM(S): 2 INJECTION INTRAMUSCULAR; INTRAVENOUS; SUBCUTANEOUS at 01:15

## 2024-05-28 RX ADMIN — POLYETHYLENE GLYCOL 3350 17 GRAM(S): 17 POWDER, FOR SOLUTION ORAL at 10:12

## 2024-05-28 RX ADMIN — Medication 1 GRAM(S): at 10:12

## 2024-05-28 NOTE — PROGRESS NOTE ADULT - SUBJECTIVE AND OBJECTIVE BOX
walking with her daughter and walker.  doing better. pain under better control but still has times when its bad. has to wait for a few hours for breakthrough med.     Physical Exam  T(C): 36.8 (05-28-24 @ 09:51), Max: 36.8 (05-28-24 @ 09:51)  HR: 66 (05-28-24 @ 09:51) (66 - 68)  BP: 128/60 (05-28-24 @ 09:51) (122/50 - 151/59)  RR: 16 (05-28-24 @ 09:51) (16 - 18)  SpO2: 97% (05-28-24 @ 09:51) (93% - 100%)  General Appearance nad, no head trauma.   EYES no scleral icterus.   ABDOMEN soft nontender, distended, BS present.   EXTREMITIES no clubbing, no cyanosis, no edema.   Skin normal skin, limited exam.   Psych normal affect.     Pertinent Labs/Imaging:  hgb 10.1  k 3.3  mg 2.1

## 2024-05-28 NOTE — PROGRESS NOTE ADULT - ASSESSMENT
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 Had CT few days ago another one was not ordered. Pt on no treatment for her Pa cancer, for local mets received RXT. Adm for further work up including r/o active cancer. RXT was a year ago.    * Recurrent abd pain, acute pancreatitis and ileus-better controlled today  GI consult reviewed- mrcp report reviewed with acute pancreatitis  Dilaudid IV and PO-adjust to achieve pain control  -diet AAT  -ondansetron prn  -bowel regimen - avoid lactulose since distended  -PPI bid  -serial abd exams  -gi eval  -onc eval  -get ct a/p if pain continues to worsen or has a fever to eval for pancreatic necrosis or abscess.  -family requested pain mngt eval-palliative consult placed    * T2DM  -lantus and sliding scale    * CAD stents  -aspirin , clopidogrel     PT eval: home    DVT proph:   	lovenox   Code Status: FULL code 	  dispo: discharge pending improvement in clinical status, advance diet and pain control, possible in the next 48-72 hrs    Outpatient follow-up:   with PMD   73-year-old female presents to ED after recent discharge from [facility] to the hospital secondary to pancreatitis from likely radiation induced pancreatitis was home for a couple hours had severe abdominal pain consistent with pain she was having in the hospital took her OxyContin dose pain was not controlled daughter became concerned when patient was in severe pain at my evaluation patient describes describes epigastric constant sharp abdominal pain no nausea vomiting no fevers . Had CT few days ago another one was not ordered. Pt on no treatment for her Pa cancer, for local mets received RXT. Adm for further work up including r/o active cancer. RXT was a year ago.    * Recurrent abd pain, acute pancreatitis and ileus-better controlled today  GI consult reviewed- mrcp report reviewed with acute pancreatitis  Dilaudid IV and PO-adjust to achieve pain control  -diet AAT  -ondansetron prn  -bowel regimen - avoid lactulose since distended  -PPI bid  -serial abd exams  -gi eval  -onc eval  -get ct a/p if pain continues to worsen or has a fever to eval for pancreatic necrosis or abscess.  -family requested pain mngt eval    * T2DM  -lantus and sliding scale    * CAD stents  -aspirin , clopidogrel     PT eval: home    DVT proph:   	lovenox   Code Status: FULL code 	  dispo: discharge pending improvement in clinical status, advance diet and pain control, possible in the next 48-72 hrs    Outpatient follow-up:   with PMD

## 2024-05-28 NOTE — PROGRESS NOTE ADULT - SUBJECTIVE AND OBJECTIVE BOX
INTERVAL HPI/OVERNIGHT EVENTS:  Patient S&E at bedside.   Daughter at bedside and another daughter on phone during discussion  pt still with abdominal pain and distension today   has been taking iv dilaudid   mineral enema did not produce bm- slight gas   remains on CLD    PAST MEDICAL & SURGICAL HISTORY:  Coronary artery disease involving native coronary artery of native heart with unstable angina pectoris  s/p stents x 20      Type 2 diabetes mellitus without complication, without long-term current use of insulin      Hyperlipidemia, unspecified hyperlipidemia type      S/P cardiac cath      H/O lithotripsy          FAMILY HISTORY:      VITAL SIGNS:  T(F): 98.1 (05-27-24 @ 21:08)  HR: 68 (05-27-24 @ 21:08)  BP: 151/59 (05-27-24 @ 21:08)  RR: 16 (05-27-24 @ 21:08)  SpO2: 100% (05-27-24 @ 21:08)  Wt(kg): --    PHYSICAL EXAM:    Constitutional: NAD  Eyes: EOMI,  Respiratory: CTA b/l,   Cardiovascular: RRR,   Gastrointestinal: distended, pain to palpation   Neurological: AAOx3      MEDICATIONS  (STANDING):  aspirin enteric coated 81 milliGRAM(s) Oral daily  bisacodyl 5 milliGRAM(s) Oral at bedtime  clopidogrel Tablet 75 milliGRAM(s) Oral daily  dextrose 10% Bolus 125 milliLiter(s) IV Bolus once  dextrose 5%. 1000 milliLiter(s) (100 mL/Hr) IV Continuous <Continuous>  dextrose 5%. 1000 milliLiter(s) (50 mL/Hr) IV Continuous <Continuous>  dextrose 50% Injectable 12.5 Gram(s) IV Push once  dextrose 50% Injectable 25 Gram(s) IV Push once  enoxaparin Injectable 40 milliGRAM(s) SubCutaneous every 24 hours  glucagon  Injectable 1 milliGRAM(s) IntraMuscular once  insulin glargine Injectable (LANTUS) 5 Unit(s) SubCutaneous at bedtime  insulin lispro (ADMELOG) corrective regimen sliding scale   SubCutaneous three times a day before meals  mineral oil enema 133 milliLiter(s) Rectal daily  nystatin    Suspension 239699 Unit(s) Oral four times a day  pantoprazole  Injectable 40 milliGRAM(s) IV Push two times a day  polyethylene glycol 3350 17 Gram(s) Oral two times a day  senna 2 Tablet(s) Oral two times a day  sodium chloride 0.9%. 1000 milliLiter(s) (125 mL/Hr) IV Continuous <Continuous>  sucralfate suspension 1 Gram(s) Oral two times a day    MEDICATIONS  (PRN):  acetaminophen     Tablet .. 650 milliGRAM(s) Oral every 6 hours PRN Temp greater or equal to 38C (100.4F), Mild Pain (1 - 3)  aluminum hydroxide/magnesium hydroxide/simethicone Suspension 30 milliLiter(s) Oral every 4 hours PRN Dyspepsia  bisacodyl Suppository 10 milliGRAM(s) Rectal daily PRN Constipation  dextrose Oral Gel 15 Gram(s) Oral once PRN Blood Glucose LESS THAN 70 milliGRAM(s)/deciliter  HYDROmorphone   Tablet 4 milliGRAM(s) Oral every 4 hours PRN Severe Pain (7 - 10)  HYDROmorphone   Tablet 2 milliGRAM(s) Oral every 4 hours PRN Moderate Pain (4 - 6)  HYDROmorphone  Injectable 0.5 milliGRAM(s) IV Push every 3 hours PRN breakthrough pain  melatonin 3 milliGRAM(s) Oral at bedtime PRN Insomnia  ondansetron Injectable 4 milliGRAM(s) IV Push every 8 hours PRN Nausea and/or Vomiting  simethicone 80 milliGRAM(s) Chew three times a day PRN Gas      Allergies    IV Contrast (Short breath)    Intolerances        LABS:                        10.1   3.76  )-----------( 231      ( 28 May 2024 07:05 )             30.2     05-28    145  |  116<H>  |  5<L>  ----------------------------<  124<H>  3.3<L>   |  25  |  0.51    Ca    8.4<L>      28 May 2024 07:05  Mg     2.1     05-28        Urinalysis Basic - ( 28 May 2024 07:05 )    Color: x / Appearance: x / SG: x / pH: x  Gluc: 124 mg/dL / Ketone: x  / Bili: x / Urobili: x   Blood: x / Protein: x / Nitrite: x   Leuk Esterase: x / RBC: x / WBC x   Sq Epi: x / Non Sq Epi: x / Bacteria: x        RADIOLOGY & ADDITIONAL TESTS:  Studies reviewed.

## 2024-05-28 NOTE — PROGRESS NOTE ADULT - ASSESSMENT
73F w/ PMH of pancreatic cancer local mets, on radiation therapy, from [facility], CAD, stents, DMII, HLD p/w abdominal pain and back pain.     # h/o Pancreatic adenocarcinoma   - pt not on any active treatment for h/o pancreatic ca   - discussed with daughter at bedside    # pancreatitis   - recent MRI ABDOMEN suggestive of acute interstitial edema consistent with pancreatitis NO evidence of recurrent disease NO pancreatic mass noted.   - labs appropriate and stable, lipase 7  - continue with ongoing pain control w/ dilaudid- discussed with patient that it would best to take PO meds and avoid waiting until pain is 10/10   - Abdominal flat plat suggestive of mild ileus - pt on CLD, passing minimal gas, no BM despite mineral enema   - MRCP suggests area of abrupt narrowing yet may very well be sequalae of prior tx/rt. no evidence of disease recurrence or pancreatic mass  - GI consult to evaluate for any intervention   - continue with IVF   - nystatin for mild thrush     will follow daily

## 2024-05-28 NOTE — CHART NOTE - NSCHARTNOTEFT_GEN_A_CORE
The patient is not actively being treated for cancer at this time, lat treatment 2 years ago as per oncology notes. Spoke with hospitalist Dr. Rodriguez to consult pain management at this time as the patient does not have a progressive illness for treatment of pancreatitis pain.  Available in palliative can be assistance please recall or reconsider.   Thank you

## 2024-05-28 NOTE — PROGRESS NOTE ADULT - SUBJECTIVE AND OBJECTIVE BOX
Patient is a 73y old  Female who presents with a chief complaint of abd pain    Followup: pancreatitis, constipation  At bedside, patient had 3 x mineral oil enemas, no bowel movement. Still with abdominal bloating, reduced appetite. Denies nausea or vomiting. Discussed plan with patient and both daughter- one via phone and one at bedside.    MEDICATIONS  (STANDING):  aspirin enteric coated 81 milliGRAM(s) Oral daily  bisacodyl 5 milliGRAM(s) Oral at bedtime  clopidogrel Tablet 75 milliGRAM(s) Oral daily  dextrose 10% Bolus 125 milliLiter(s) IV Bolus once  dextrose 5%. 1000 milliLiter(s) (100 mL/Hr) IV Continuous <Continuous>  dextrose 5%. 1000 milliLiter(s) (50 mL/Hr) IV Continuous <Continuous>  dextrose 50% Injectable 12.5 Gram(s) IV Push once  dextrose 50% Injectable 25 Gram(s) IV Push once  enoxaparin Injectable 40 milliGRAM(s) SubCutaneous every 24 hours  glucagon  Injectable 1 milliGRAM(s) IntraMuscular once  insulin glargine Injectable (LANTUS) 5 Unit(s) SubCutaneous at bedtime  insulin lispro (ADMELOG) corrective regimen sliding scale   SubCutaneous three times a day before meals  mineral oil enema 133 milliLiter(s) Rectal daily  nystatin    Suspension 653253 Unit(s) Oral four times a day  pantoprazole  Injectable 40 milliGRAM(s) IV Push two times a day  polyethylene glycol 3350 17 Gram(s) Oral two times a day  polyethylene glycol/electrolyte Solution 1 Liter(s) Oral once  potassium chloride    Tablet ER 40 milliEquivalent(s) Oral every 4 hours  senna 2 Tablet(s) Oral two times a day  sodium chloride 0.9%. 1000 milliLiter(s) (125 mL/Hr) IV Continuous <Continuous>  sucralfate suspension 1 Gram(s) Oral two times a day    MEDICATIONS  (PRN):  acetaminophen     Tablet .. 650 milliGRAM(s) Oral every 6 hours PRN Temp greater or equal to 38C (100.4F), Mild Pain (1 - 3)  aluminum hydroxide/magnesium hydroxide/simethicone Suspension 30 milliLiter(s) Oral every 4 hours PRN Dyspepsia  bisacodyl Suppository 10 milliGRAM(s) Rectal daily PRN Constipation  dextrose Oral Gel 15 Gram(s) Oral once PRN Blood Glucose LESS THAN 70 milliGRAM(s)/deciliter  HYDROmorphone   Tablet 2 milliGRAM(s) Oral every 4 hours PRN Moderate Pain (4 - 6)  HYDROmorphone   Tablet 4 milliGRAM(s) Oral every 4 hours PRN Severe Pain (7 - 10)  HYDROmorphone  Injectable 0.5 milliGRAM(s) IV Push every 3 hours PRN breakthrough pain  melatonin 3 milliGRAM(s) Oral at bedtime PRN Insomnia  ondansetron Injectable 4 milliGRAM(s) IV Push every 8 hours PRN Nausea and/or Vomiting  simethicone 80 milliGRAM(s) Chew three times a day PRN Gas      Vital Signs Last 24 Hrs  T(C): 36.8 (28 May 2024 09:51), Max: 36.8 (28 May 2024 09:51)  T(F): 98.2 (28 May 2024 09:51), Max: 98.2 (28 May 2024 09:51)  HR: 66 (28 May 2024 09:51) (66 - 68)  BP: 128/60 (28 May 2024 09:51) (122/50 - 151/59)  BP(mean): 75 (28 May 2024 09:51) (75 - 75)  RR: 16 (28 May 2024 09:51) (16 - 18)  SpO2: 97% (28 May 2024 09:51) (93% - 100%)    Parameters below as of 28 May 2024 09:51  Patient On (Oxygen Delivery Method): room air    PHYSICAL EXAM:    Constitutional: No acute distress,  non-toxic appearing  HEENT: masked, good phonation, not icteric  Neck: supple, no lymphadenopathy  Respiratory: clear to ascultation bilaterally, no wheezing  Cardiovascular: S1 and S2, regular rate and rhythm, no murmurs rubs or gallops  Gastrointestinal: soft, moderate tenderness to palpation >UQ, +bowel sounds, no rebound or guarding, no surgical scars, no drains  Extremities: No peripheral edema, no cyanosis or clubbing  Vascular: 2+ peripheral pulses, no venous stasis  Neurological: A/O x 3, no focal deficits, no asterixis  Psychiatric: Normal mood, normal affect  Skin: No rashes, not jaundiced    LABS:                        10.1   3.76  )-----------( 231      ( 28 May 2024 07:05 )             30.2     05-28    145  |  116<H>  |  5<L>  ----------------------------<  124<H>  3.3<L>   |  25  |  0.51    Ca    8.4<L>      28 May 2024 07:05  Mg     2.1     05-28    RADIOLOGY & ADDITIONAL STUDIES:  IMPRESSION:  Acute interstitial edematous pancreatitis. No evidence of pancreatic   necrosis or drainable peripancreatic fluid collection.    Narrowing of the pancreatic neck with abrupt termination/stricture of the   pancreatic duct with surrounding signal changes, likely posttreatment   change. No definitive evidence of pancreatic mass. Patient is a 73y old  Female who presents with a chief complaint of abd pain    Followup: pancreatitis, constipation    At bedside, patient had 3 x mineral oil enemas, no bowel movement. Still with abdominal bloating, reduced appetite. Denies nausea or vomiting. Discussed plan with patient and both daughter- one via phone and one at bedside.    MEDICATIONS  (STANDING):  aspirin enteric coated 81 milliGRAM(s) Oral daily  bisacodyl 5 milliGRAM(s) Oral at bedtime  clopidogrel Tablet 75 milliGRAM(s) Oral daily  dextrose 10% Bolus 125 milliLiter(s) IV Bolus once  dextrose 5%. 1000 milliLiter(s) (100 mL/Hr) IV Continuous <Continuous>  dextrose 5%. 1000 milliLiter(s) (50 mL/Hr) IV Continuous <Continuous>  dextrose 50% Injectable 12.5 Gram(s) IV Push once  dextrose 50% Injectable 25 Gram(s) IV Push once  enoxaparin Injectable 40 milliGRAM(s) SubCutaneous every 24 hours  glucagon  Injectable 1 milliGRAM(s) IntraMuscular once  insulin glargine Injectable (LANTUS) 5 Unit(s) SubCutaneous at bedtime  insulin lispro (ADMELOG) corrective regimen sliding scale   SubCutaneous three times a day before meals  mineral oil enema 133 milliLiter(s) Rectal daily  nystatin    Suspension 500383 Unit(s) Oral four times a day  pantoprazole  Injectable 40 milliGRAM(s) IV Push two times a day  polyethylene glycol 3350 17 Gram(s) Oral two times a day  polyethylene glycol/electrolyte Solution 1 Liter(s) Oral once  potassium chloride    Tablet ER 40 milliEquivalent(s) Oral every 4 hours  senna 2 Tablet(s) Oral two times a day  sodium chloride 0.9%. 1000 milliLiter(s) (125 mL/Hr) IV Continuous <Continuous>  sucralfate suspension 1 Gram(s) Oral two times a day    MEDICATIONS  (PRN):  acetaminophen     Tablet .. 650 milliGRAM(s) Oral every 6 hours PRN Temp greater or equal to 38C (100.4F), Mild Pain (1 - 3)  aluminum hydroxide/magnesium hydroxide/simethicone Suspension 30 milliLiter(s) Oral every 4 hours PRN Dyspepsia  bisacodyl Suppository 10 milliGRAM(s) Rectal daily PRN Constipation  dextrose Oral Gel 15 Gram(s) Oral once PRN Blood Glucose LESS THAN 70 milliGRAM(s)/deciliter  HYDROmorphone   Tablet 2 milliGRAM(s) Oral every 4 hours PRN Moderate Pain (4 - 6)  HYDROmorphone   Tablet 4 milliGRAM(s) Oral every 4 hours PRN Severe Pain (7 - 10)  HYDROmorphone  Injectable 0.5 milliGRAM(s) IV Push every 3 hours PRN breakthrough pain  melatonin 3 milliGRAM(s) Oral at bedtime PRN Insomnia  ondansetron Injectable 4 milliGRAM(s) IV Push every 8 hours PRN Nausea and/or Vomiting  simethicone 80 milliGRAM(s) Chew three times a day PRN Gas      Vital Signs Last 24 Hrs  T(C): 36.8 (28 May 2024 09:51), Max: 36.8 (28 May 2024 09:51)  T(F): 98.2 (28 May 2024 09:51), Max: 98.2 (28 May 2024 09:51)  HR: 66 (28 May 2024 09:51) (66 - 68)  BP: 128/60 (28 May 2024 09:51) (122/50 - 151/59)  BP(mean): 75 (28 May 2024 09:51) (75 - 75)  RR: 16 (28 May 2024 09:51) (16 - 18)  SpO2: 97% (28 May 2024 09:51) (93% - 100%)    Parameters below as of 28 May 2024 09:51  Patient On (Oxygen Delivery Method): room air    PHYSICAL EXAM:    Constitutional: No acute distress,  non-toxic appearing  HEENT: unmasked, good phonation, not icteric  Neck: supple, no lymphadenopathy  Respiratory: clear to ascultation bilaterally, no wheezing  Cardiovascular: S1 and S2, regular rate and rhythm, no murmurs rubs or gallops  Gastrointestinal: soft, moderate tenderness to palpation greatest in upper quadrants, +bowel sounds, no rebound or guarding  Extremities: No peripheral edema, no cyanosis or clubbing  Vascular: 2+ peripheral pulses, no venous stasis  Neurological: A/O x 3, no focal deficits, no asterixis  Psychiatric: Normal mood, normal affect  Skin: No rashes, not jaundiced    LABS:                        10.1   3.76  )-----------( 231      ( 28 May 2024 07:05 )             30.2     05-28    145  |  116<H>  |  5<L>  ----------------------------<  124<H>  3.3<L>   |  25  |  0.51    Ca    8.4<L>      28 May 2024 07:05  Mg     2.1     05-28    RADIOLOGY & ADDITIONAL STUDIES:  IMPRESSION:  Acute interstitial edematous pancreatitis. No evidence of pancreatic   necrosis or drainable peripancreatic fluid collection.    Narrowing of the pancreatic neck with abrupt termination/stricture of the   pancreatic duct with surrounding signal changes, likely posttreatment   change. No definitive evidence of pancreatic mass.

## 2024-05-29 LAB
ANION GAP SERPL CALC-SCNC: 4 MMOL/L — LOW (ref 5–17)
BUN SERPL-MCNC: 5 MG/DL — LOW (ref 7–23)
CALCIUM SERPL-MCNC: 8.7 MG/DL — SIGNIFICANT CHANGE UP (ref 8.5–10.1)
CHLORIDE SERPL-SCNC: 112 MMOL/L — HIGH (ref 96–108)
CO2 SERPL-SCNC: 23 MMOL/L — SIGNIFICANT CHANGE UP (ref 22–31)
CREAT SERPL-MCNC: 0.58 MG/DL — SIGNIFICANT CHANGE UP (ref 0.5–1.3)
EGFR: 95 ML/MIN/1.73M2 — SIGNIFICANT CHANGE UP
GLUCOSE BLDC GLUCOMTR-MCNC: 108 MG/DL — HIGH (ref 70–99)
GLUCOSE BLDC GLUCOMTR-MCNC: 113 MG/DL — HIGH (ref 70–99)
GLUCOSE BLDC GLUCOMTR-MCNC: 134 MG/DL — HIGH (ref 70–99)
GLUCOSE SERPL-MCNC: 119 MG/DL — HIGH (ref 70–99)
HCT VFR BLD CALC: 31.7 % — LOW (ref 34.5–45)
HGB BLD-MCNC: 10.4 G/DL — LOW (ref 11.5–15.5)
MCHC RBC-ENTMCNC: 28.9 PG — SIGNIFICANT CHANGE UP (ref 27–34)
MCHC RBC-ENTMCNC: 32.8 GM/DL — SIGNIFICANT CHANGE UP (ref 32–36)
MCV RBC AUTO: 88.1 FL — SIGNIFICANT CHANGE UP (ref 80–100)
PLATELET # BLD AUTO: 246 K/UL — SIGNIFICANT CHANGE UP (ref 150–400)
POTASSIUM SERPL-MCNC: 3.6 MMOL/L — SIGNIFICANT CHANGE UP (ref 3.5–5.3)
POTASSIUM SERPL-SCNC: 3.6 MMOL/L — SIGNIFICANT CHANGE UP (ref 3.5–5.3)
RBC # BLD: 3.6 M/UL — LOW (ref 3.8–5.2)
RBC # FLD: 13 % — SIGNIFICANT CHANGE UP (ref 10.3–14.5)
SODIUM SERPL-SCNC: 139 MMOL/L — SIGNIFICANT CHANGE UP (ref 135–145)
WBC # BLD: 3.83 K/UL — SIGNIFICANT CHANGE UP (ref 3.8–10.5)
WBC # FLD AUTO: 3.83 K/UL — SIGNIFICANT CHANGE UP (ref 3.8–10.5)

## 2024-05-29 PROCEDURE — 74018 RADEX ABDOMEN 1 VIEW: CPT | Mod: 26

## 2024-05-29 PROCEDURE — 99232 SBSQ HOSP IP/OBS MODERATE 35: CPT

## 2024-05-29 RX ORDER — NALOXEGOL OXALATE 12.5 MG/1
12.5 TABLET, FILM COATED ORAL DAILY
Refills: 0 | Status: DISCONTINUED | OUTPATIENT
Start: 2024-05-29 | End: 2024-06-05

## 2024-05-29 RX ADMIN — PANTOPRAZOLE SODIUM 40 MILLIGRAM(S): 20 TABLET, DELAYED RELEASE ORAL at 21:33

## 2024-05-29 RX ADMIN — SENNA PLUS 2 TABLET(S): 8.6 TABLET ORAL at 21:33

## 2024-05-29 RX ADMIN — ONDANSETRON 4 MILLIGRAM(S): 8 TABLET, FILM COATED ORAL at 05:41

## 2024-05-29 RX ADMIN — Medication 1 GRAM(S): at 10:04

## 2024-05-29 RX ADMIN — Medication 1 GRAM(S): at 21:34

## 2024-05-29 RX ADMIN — Medication 500000 UNIT(S): at 19:02

## 2024-05-29 RX ADMIN — HYDROMORPHONE HYDROCHLORIDE 4 MILLIGRAM(S): 2 INJECTION INTRAMUSCULAR; INTRAVENOUS; SUBCUTANEOUS at 04:03

## 2024-05-29 RX ADMIN — HYDROMORPHONE HYDROCHLORIDE 4 MILLIGRAM(S): 2 INJECTION INTRAMUSCULAR; INTRAVENOUS; SUBCUTANEOUS at 10:03

## 2024-05-29 RX ADMIN — SODIUM CHLORIDE 125 MILLILITER(S): 9 INJECTION INTRAMUSCULAR; INTRAVENOUS; SUBCUTANEOUS at 04:14

## 2024-05-29 RX ADMIN — HYDROMORPHONE HYDROCHLORIDE 0.5 MILLIGRAM(S): 2 INJECTION INTRAMUSCULAR; INTRAVENOUS; SUBCUTANEOUS at 00:31

## 2024-05-29 RX ADMIN — POLYETHYLENE GLYCOL 3350 17 GRAM(S): 17 POWDER, FOR SOLUTION ORAL at 10:04

## 2024-05-29 RX ADMIN — SODIUM CHLORIDE 125 MILLILITER(S): 9 INJECTION INTRAMUSCULAR; INTRAVENOUS; SUBCUTANEOUS at 20:24

## 2024-05-29 RX ADMIN — Medication 5 MILLIGRAM(S): at 21:34

## 2024-05-29 RX ADMIN — Medication 650 MILLIGRAM(S): at 20:22

## 2024-05-29 RX ADMIN — SIMETHICONE 80 MILLIGRAM(S): 80 TABLET, CHEWABLE ORAL at 19:34

## 2024-05-29 RX ADMIN — CLOPIDOGREL BISULFATE 75 MILLIGRAM(S): 75 TABLET, FILM COATED ORAL at 10:04

## 2024-05-29 RX ADMIN — Medication 81 MILLIGRAM(S): at 21:32

## 2024-05-29 RX ADMIN — Medication 650 MILLIGRAM(S): at 14:06

## 2024-05-29 RX ADMIN — HYDROMORPHONE HYDROCHLORIDE 0.5 MILLIGRAM(S): 2 INJECTION INTRAMUSCULAR; INTRAVENOUS; SUBCUTANEOUS at 06:38

## 2024-05-29 RX ADMIN — SENNA PLUS 2 TABLET(S): 8.6 TABLET ORAL at 10:04

## 2024-05-29 RX ADMIN — NALOXEGOL OXALATE 12.5 MILLIGRAM(S): 12.5 TABLET, FILM COATED ORAL at 10:42

## 2024-05-29 RX ADMIN — Medication 500000 UNIT(S): at 05:41

## 2024-05-29 RX ADMIN — POLYETHYLENE GLYCOL 3350 17 GRAM(S): 17 POWDER, FOR SOLUTION ORAL at 21:34

## 2024-05-29 RX ADMIN — PANTOPRAZOLE SODIUM 40 MILLIGRAM(S): 20 TABLET, DELAYED RELEASE ORAL at 10:04

## 2024-05-29 RX ADMIN — HYDROMORPHONE HYDROCHLORIDE 4 MILLIGRAM(S): 2 INJECTION INTRAMUSCULAR; INTRAVENOUS; SUBCUTANEOUS at 21:37

## 2024-05-29 NOTE — PROGRESS NOTE ADULT - ASSESSMENT
73F w/ PMH of pancreatic cancer local mets, on radiation therapy, from [facility], CAD, stents, DMII, HLD p/w abdominal pain and back pain.     # h/o Pancreatic adenocarcinoma   - pt not on any active treatment for h/o pancreatic ca   - discussed with daughter at bedside    # pancreatitis   - recent MRI ABDOMEN suggestive of acute interstitial edema consistent with pancreatitis NO evidence of recurrent disease NO pancreatic mass noted.   - continue with ongoing pain control w/ dilaudid- pt is more drowsy this am   - MRCP suggests area of abrupt narrowing yet may very well be sequalae of prior tx/rt. no evidence of disease recurrence or pancreatic mass  - GI consult - As per GI Dr. Palma- Plan on outpatient followup for EUS/ERCP evaluation stenosis/stricture 2/2 radiation versus malignancy with biopsy, possible stent given current inflamed state.   - continue with IVF     # r/o obstruction   - pt with significant constipation despite multiple enemas and treatment   - previous Abdominal flat plat suggestive of mild ileus - pt w/ more distension will repeat AXR today- on CLD, passing minimal gas, no BM despite mineral enema   - given dilaudid use would start movantik if AXR negative for opioid induced constipation     discussed w daughter at bedside     will follow daily and communicate with [facility]

## 2024-05-29 NOTE — PROGRESS NOTE ADULT - SUBJECTIVE AND OBJECTIVE BOX
INTERVAL HPI/OVERNIGHT EVENTS:  Patient S&E at bedside.    pain consult placed. Daughter is concerned about deterioration for patient.   WBC 3.83, Hb 10.4, plt 246,   At bedside, patient had mineral oil enemas, no bowel movement, still with abdominal bloating, - ordered MOvantik  overnight she had iv dilaudid.    As per GI Dr. Palma- Plan on outpatient followup for EUS/ERCP evaluation stenosis/stricture 2/2 radiation versus malignancy with biopsy, possible stent given current inflamed state.   Couldn’t tolerate prep - had nausea and episode of vomiting.   repeat AXR prior to movantik        PAST MEDICAL & SURGICAL HISTORY:  Coronary artery disease involving native coronary artery of native heart with unstable angina pectoris  s/p stents x 20      Type 2 diabetes mellitus without complication, without long-term current use of insulin      Hyperlipidemia, unspecified hyperlipidemia type      S/P cardiac cath      H/O lithotripsy          FAMILY HISTORY:      VITAL SIGNS:  T(F): 98.4 (05-29-24 @ 09:39)  HR: 67 (05-29-24 @ 09:39)  BP: 142/53 (05-29-24 @ 09:39)  RR: 18 (05-29-24 @ 09:39)  SpO2: 94% (05-29-24 @ 09:39)  Wt(kg): --    PHYSICAL EXAM:    Constitutional: sluggish, fatigue, in pain abdomen, drowsy   Eyes: EOMI,  Respiratory: CTA b/l,   Cardiovascular: RRR,   Gastrointestinal: distended, pain to palpation   Neurological: AAOx3      MEDICATIONS  (STANDING):  aspirin enteric coated 81 milliGRAM(s) Oral daily  bisacodyl 5 milliGRAM(s) Oral at bedtime  clopidogrel Tablet 75 milliGRAM(s) Oral daily  dextrose 10% Bolus 125 milliLiter(s) IV Bolus once  dextrose 5%. 1000 milliLiter(s) (100 mL/Hr) IV Continuous <Continuous>  dextrose 5%. 1000 milliLiter(s) (50 mL/Hr) IV Continuous <Continuous>  dextrose 50% Injectable 12.5 Gram(s) IV Push once  dextrose 50% Injectable 25 Gram(s) IV Push once  enoxaparin Injectable 40 milliGRAM(s) SubCutaneous every 24 hours  glucagon  Injectable 1 milliGRAM(s) IntraMuscular once  insulin glargine Injectable (LANTUS) 5 Unit(s) SubCutaneous at bedtime  insulin lispro (ADMELOG) corrective regimen sliding scale   SubCutaneous three times a day before meals  naloxegol 12.5 milliGRAM(s) Oral daily  nystatin    Suspension 415966 Unit(s) Oral four times a day  pantoprazole  Injectable 40 milliGRAM(s) IV Push two times a day  polyethylene glycol 3350 17 Gram(s) Oral two times a day  senna 2 Tablet(s) Oral two times a day  sodium chloride 0.9%. 1000 milliLiter(s) (125 mL/Hr) IV Continuous <Continuous>  sucralfate suspension 1 Gram(s) Oral two times a day    MEDICATIONS  (PRN):  acetaminophen     Tablet .. 650 milliGRAM(s) Oral every 6 hours PRN Temp greater or equal to 38C (100.4F), Mild Pain (1 - 3)  aluminum hydroxide/magnesium hydroxide/simethicone Suspension 30 milliLiter(s) Oral every 4 hours PRN Dyspepsia  bisacodyl Suppository 10 milliGRAM(s) Rectal daily PRN Constipation  dextrose Oral Gel 15 Gram(s) Oral once PRN Blood Glucose LESS THAN 70 milliGRAM(s)/deciliter  HYDROmorphone   Tablet 4 milliGRAM(s) Oral every 4 hours PRN Severe Pain (7 - 10)  HYDROmorphone   Tablet 2 milliGRAM(s) Oral every 4 hours PRN Moderate Pain (4 - 6)  HYDROmorphone  Injectable 0.5 milliGRAM(s) IV Push every 3 hours PRN breakthrough pain  melatonin 3 milliGRAM(s) Oral at bedtime PRN Insomnia  ondansetron Injectable 4 milliGRAM(s) IV Push every 8 hours PRN Nausea and/or Vomiting  simethicone 80 milliGRAM(s) Chew three times a day PRN Gas      Allergies    IV Contrast (Short breath)    Intolerances        LABS:                        10.4   3.83  )-----------( 246      ( 29 May 2024 06:50 )             31.7     05-29    139  |  112<H>  |  5<L>  ----------------------------<  119<H>  3.6   |  23  |  0.58    Ca    8.7      29 May 2024 06:50  Mg     2.1     05-28        Urinalysis Basic - ( 29 May 2024 06:50 )    Color: x / Appearance: x / SG: x / pH: x  Gluc: 119 mg/dL / Ketone: x  / Bili: x / Urobili: x   Blood: x / Protein: x / Nitrite: x   Leuk Esterase: x / RBC: x / WBC x   Sq Epi: x / Non Sq Epi: x / Bacteria: x        RADIOLOGY & ADDITIONAL TESTS:  Studies reviewed.

## 2024-05-29 NOTE — PROGRESS NOTE ADULT - SUBJECTIVE AND OBJECTIVE BOX
73F w/ PMH of pancreatic cancer with mets, s/p local  radiation therapy, Radiation induced Pancreatitis, , CAD, stents, DMII, HLD p/w abdominal pain found to have Pancreatitis and Ileus .   s/p IV hydration   -s/p bowel prep and laxatives/enema for constipation     5.29: still c/o epigastric pain, had liquid BM            REVIEW OF SYSTEMS:    CONSTITUTIONAL: No weakness, No fevers or chills  ENT: No ear ache, No sorethroat  NECK: No pain, No stiffness  RESPIRATORY: No cough, No wheezing, No hemoptysis; No dyspnea  CARDIOVASCULAR: No chest pain, No palpitations  GASTROINTESTINAL: No abd pain, No nausea, No vomiting, No hematemesis, No diarrhea or constipation. No melena, No hematochezia.  GENITOURINARY: No dysuria, No  hematuria  NEUROLOGICAL: No diplopia, No paresthesia, No motor dysfunction  MUSCULOSKELETAL: No arthralgia, No myalgia  SKIN: No rashes, or lesions   PSYCH: no anxiety, no suicidal ideation    All other review of systems is negative unless indicated above    Vital Signs Last 24 Hrs  T(C): 36.9 (29 May 2024 09:39), Max: 37.1 (28 May 2024 21:19)  T(F): 98.4 (29 May 2024 09:39), Max: 98.7 (28 May 2024 21:19)  HR: 67 (29 May 2024 09:39) (67 - 69)  BP: 142/53 (29 May 2024 09:39) (142/53 - 145/56)  RR: 18 (29 May 2024 09:39) (18 - 18)  SpO2: 94% (29 May 2024 09:39) (94% - 99%)    Parameters below as of 29 May 2024 09:39  Patient On (Oxygen Delivery Method): room air        PHYSICAL EXAM:    GENERAL: NAD  HEENT:  NC/AT, EOMI, PERRLA, No scleral icterus, Moist mucous membranes  NECK: Supple, No JVD  CNS:  Alert & Oriented X3, Motor Strength 5/5 B/L upper and lower extremities; DTRs 2+ intact   LUNG: Normal Breath sounds, Clear to auscultation bilaterally, No rales, No rhonchi, No wheezing  HEART: RRR; No murmurs, No rubs  ABDOMEN: +BS, ST/ND, +TTP to epigastric area   GENITOURINARY: Voiding, Bladder not distended  EXTREMITIES:  2+ Peripheral Pulses, No clubbing, No cyanosis, No tibial edema  MUSCULOSKELTAL: Joints normal ROM, No TTP, No effusion  VAGINAL: deferred  SKIN: no rashes  RECTAL: deferred, not indicated  BREAST: deferred                          10.4   3.83  )-----------( 246      ( 29 May 2024 06:50 )             31.7     05-29    139  |  112<H>  |  5<L>  ----------------------------<  119<H>  3.6   |  23  |  0.58    Ca    8.7      29 May 2024 06:50  Mg     2.1     05-28      Vancomycin levels:   Cultures:     MEDICATIONS  (STANDING):  aspirin enteric coated 81 milliGRAM(s) Oral daily  bisacodyl 5 milliGRAM(s) Oral at bedtime  clopidogrel Tablet 75 milliGRAM(s) Oral daily  enoxaparin Injectable 40 milliGRAM(s) SubCutaneous every 24 hours  glucagon  Injectable 1 milliGRAM(s) IntraMuscular once  insulin glargine Injectable (LANTUS) 5 Unit(s) SubCutaneous at bedtime  insulin lispro (ADMELOG) corrective regimen sliding scale   SubCutaneous three times a day before meals  naloxegol 12.5 milliGRAM(s) Oral daily  nystatin    Suspension 517397 Unit(s) Oral four times a day  pantoprazole  Injectable 40 milliGRAM(s) IV Push two times a day  polyethylene glycol 3350 17 Gram(s) Oral two times a day  senna 2 Tablet(s) Oral two times a day  sodium chloride 0.9%. 1000 milliLiter(s) (125 mL/Hr) IV Continuous <Continuous>  sucralfate suspension 1 Gram(s) Oral two times a day    MEDICATIONS  (PRN):  acetaminophen     Tablet .. 650 milliGRAM(s) Oral every 6 hours PRN Temp greater or equal to 38C (100.4F), Mild Pain (1 - 3)  aluminum hydroxide/magnesium hydroxide/simethicone Suspension 30 milliLiter(s) Oral every 4 hours PRN Dyspepsia  bisacodyl Suppository 10 milliGRAM(s) Rectal daily PRN Constipation  dextrose Oral Gel 15 Gram(s) Oral once PRN Blood Glucose LESS THAN 70 milliGRAM(s)/deciliter  HYDROmorphone   Tablet 4 milliGRAM(s) Oral every 4 hours PRN Severe Pain (7 - 10)  HYDROmorphone   Tablet 2 milliGRAM(s) Oral every 4 hours PRN Moderate Pain (4 - 6)  HYDROmorphone  Injectable 0.5 milliGRAM(s) IV Push every 3 hours PRN breakthrough pain  melatonin 3 milliGRAM(s) Oral at bedtime PRN Insomnia  ondansetron Injectable 4 milliGRAM(s) IV Push every 8 hours PRN Nausea and/or Vomiting  simethicone 80 milliGRAM(s) Chew three times a day PRN Gas      all labs reviewed  all imaging reviewed        1. Recurrent abd pain, acute pancreatitis and ileus  Axay: improvement in ileus  c/w IV hydration   analgesia w Dilaudid prn   -advance diet to regular     -PPI bid  -started on Movantik to prevent opiate induced constipation     2. T2DM  -lantus and sliding scale    3.  CAD s/p PCI  c/w aspirin , clopidogrel     PT eval: home    DVT proph:   	lovenox   Code Status: FULL code 	  dispo: discharge pending improvement in clinical status, advance diet and pain control, possible in the next 48-72 hrs    Outpatient follow-up:   with PMD       73F w/ PMH of pancreatic cancer with mets, s/p local  radiation therapy, Radiation induced Pancreatitis, , CAD, stents, DMII, HLD p/w abdominal pain found to have Pancreatitis and Ileus .   s/p IV hydration   -s/p bowel prep and laxatives/enema for constipation     5.29: still c/o epigastric pain, had liquid BM            REVIEW OF SYSTEMS:    CONSTITUTIONAL: No weakness, No fevers or chills  ENT: No ear ache, No sorethroat  NECK: No pain, No stiffness  RESPIRATORY: No cough, No wheezing, No hemoptysis; No dyspnea  CARDIOVASCULAR: No chest pain, No palpitations  GASTROINTESTINAL: No abd pain, No nausea, No vomiting, No hematemesis, No diarrhea or constipation. No melena, No hematochezia.  GENITOURINARY: No dysuria, No  hematuria  NEUROLOGICAL: No diplopia, No paresthesia, No motor dysfunction  MUSCULOSKELETAL: No arthralgia, No myalgia  SKIN: No rashes, or lesions   PSYCH: no anxiety, no suicidal ideation    All other review of systems is negative unless indicated above    Vital Signs Last 24 Hrs  T(C): 36.9 (29 May 2024 09:39), Max: 37.1 (28 May 2024 21:19)  T(F): 98.4 (29 May 2024 09:39), Max: 98.7 (28 May 2024 21:19)  HR: 67 (29 May 2024 09:39) (67 - 69)  BP: 142/53 (29 May 2024 09:39) (142/53 - 145/56)  RR: 18 (29 May 2024 09:39) (18 - 18)  SpO2: 94% (29 May 2024 09:39) (94% - 99%)    Parameters below as of 29 May 2024 09:39  Patient On (Oxygen Delivery Method): room air        PHYSICAL EXAM:    GENERAL: NAD  HEENT:  NC/AT, EOMI, PERRLA, No scleral icterus, Moist mucous membranes  NECK: Supple, No JVD  CNS:  Alert & Oriented X3, Motor Strength 5/5 B/L upper and lower extremities; DTRs 2+ intact   LUNG: Normal Breath sounds, Clear to auscultation bilaterally, No rales, No rhonchi, No wheezing  HEART: RRR; No murmurs, No rubs  ABDOMEN: +BS, ST/ND, +TTP to epigastric area   GENITOURINARY: Voiding, Bladder not distended  EXTREMITIES:  2+ Peripheral Pulses, No clubbing, No cyanosis, No tibial edema  MUSCULOSKELTAL: Joints normal ROM, No TTP, No effusion  VAGINAL: deferred  SKIN: no rashes  RECTAL: deferred, not indicated  BREAST: deferred                          10.4   3.83  )-----------( 246      ( 29 May 2024 06:50 )             31.7     05-29    139  |  112<H>  |  5<L>  ----------------------------<  119<H>  3.6   |  23  |  0.58    Ca    8.7      29 May 2024 06:50  Mg     2.1     05-28      Vancomycin levels:   Cultures:     MEDICATIONS  (STANDING):  aspirin enteric coated 81 milliGRAM(s) Oral daily  bisacodyl 5 milliGRAM(s) Oral at bedtime  clopidogrel Tablet 75 milliGRAM(s) Oral daily  enoxaparin Injectable 40 milliGRAM(s) SubCutaneous every 24 hours  glucagon  Injectable 1 milliGRAM(s) IntraMuscular once  insulin glargine Injectable (LANTUS) 5 Unit(s) SubCutaneous at bedtime  insulin lispro (ADMELOG) corrective regimen sliding scale   SubCutaneous three times a day before meals  naloxegol 12.5 milliGRAM(s) Oral daily  nystatin    Suspension 268692 Unit(s) Oral four times a day  pantoprazole  Injectable 40 milliGRAM(s) IV Push two times a day  polyethylene glycol 3350 17 Gram(s) Oral two times a day  senna 2 Tablet(s) Oral two times a day  sodium chloride 0.9%. 1000 milliLiter(s) (125 mL/Hr) IV Continuous <Continuous>  sucralfate suspension 1 Gram(s) Oral two times a day    MEDICATIONS  (PRN):  acetaminophen     Tablet .. 650 milliGRAM(s) Oral every 6 hours PRN Temp greater or equal to 38C (100.4F), Mild Pain (1 - 3)  aluminum hydroxide/magnesium hydroxide/simethicone Suspension 30 milliLiter(s) Oral every 4 hours PRN Dyspepsia  bisacodyl Suppository 10 milliGRAM(s) Rectal daily PRN Constipation  dextrose Oral Gel 15 Gram(s) Oral once PRN Blood Glucose LESS THAN 70 milliGRAM(s)/deciliter  HYDROmorphone   Tablet 4 milliGRAM(s) Oral every 4 hours PRN Severe Pain (7 - 10)  HYDROmorphone   Tablet 2 milliGRAM(s) Oral every 4 hours PRN Moderate Pain (4 - 6)  HYDROmorphone  Injectable 0.5 milliGRAM(s) IV Push every 3 hours PRN breakthrough pain  melatonin 3 milliGRAM(s) Oral at bedtime PRN Insomnia  ondansetron Injectable 4 milliGRAM(s) IV Push every 8 hours PRN Nausea and/or Vomiting  simethicone 80 milliGRAM(s) Chew three times a day PRN Gas      all labs reviewed  all imaging reviewed        1. Recurrent abd pain, acute pancreatitis and ileus  Axay: improvement in ileus  MRCP: PD stenosis  will need ERCP with PD dilation when stable in the near future   c/w IV hydration   analgesia w Dilaudid prn   -advance diet to regular     -PPI bid  -started on Movantik to prevent opiate induced constipation     2. T2DM  -lantus and sliding scale    3.  CAD s/p PCI  c/w aspirin , clopidogrel     PT eval: home    DVT proph:   	lovenox   Code Status: FULL code 	  dispo: discharge pending improvement in clinical status, advance diet and pain control, possible in the next 48-72 hrs    Outpatient follow-up:   with PMD

## 2024-05-30 LAB
GLUCOSE BLDC GLUCOMTR-MCNC: 102 MG/DL — HIGH (ref 70–99)
GLUCOSE BLDC GLUCOMTR-MCNC: 130 MG/DL — HIGH (ref 70–99)
GLUCOSE BLDC GLUCOMTR-MCNC: 133 MG/DL — HIGH (ref 70–99)
GLUCOSE BLDC GLUCOMTR-MCNC: 140 MG/DL — HIGH (ref 70–99)

## 2024-05-30 PROCEDURE — 99232 SBSQ HOSP IP/OBS MODERATE 35: CPT

## 2024-05-30 RX ORDER — METOCLOPRAMIDE HCL 10 MG
10 TABLET ORAL EVERY 6 HOURS
Refills: 0 | Status: DISCONTINUED | OUTPATIENT
Start: 2024-05-30 | End: 2024-06-05

## 2024-05-30 RX ADMIN — HYDROMORPHONE HYDROCHLORIDE 4 MILLIGRAM(S): 2 INJECTION INTRAMUSCULAR; INTRAVENOUS; SUBCUTANEOUS at 14:31

## 2024-05-30 RX ADMIN — Medication 1 GRAM(S): at 10:34

## 2024-05-30 RX ADMIN — SODIUM CHLORIDE 125 MILLILITER(S): 9 INJECTION INTRAMUSCULAR; INTRAVENOUS; SUBCUTANEOUS at 18:07

## 2024-05-30 RX ADMIN — SODIUM CHLORIDE 125 MILLILITER(S): 9 INJECTION INTRAMUSCULAR; INTRAVENOUS; SUBCUTANEOUS at 10:29

## 2024-05-30 RX ADMIN — PANTOPRAZOLE SODIUM 40 MILLIGRAM(S): 20 TABLET, DELAYED RELEASE ORAL at 10:33

## 2024-05-30 RX ADMIN — CLOPIDOGREL BISULFATE 75 MILLIGRAM(S): 75 TABLET, FILM COATED ORAL at 10:35

## 2024-05-30 RX ADMIN — HYDROMORPHONE HYDROCHLORIDE 4 MILLIGRAM(S): 2 INJECTION INTRAMUSCULAR; INTRAVENOUS; SUBCUTANEOUS at 22:39

## 2024-05-30 RX ADMIN — POLYETHYLENE GLYCOL 3350 17 GRAM(S): 17 POWDER, FOR SOLUTION ORAL at 10:33

## 2024-05-30 RX ADMIN — Medication 500000 UNIT(S): at 12:38

## 2024-05-30 RX ADMIN — HYDROMORPHONE HYDROCHLORIDE 4 MILLIGRAM(S): 2 INJECTION INTRAMUSCULAR; INTRAVENOUS; SUBCUTANEOUS at 06:21

## 2024-05-30 RX ADMIN — Medication 81 MILLIGRAM(S): at 21:49

## 2024-05-30 RX ADMIN — POLYETHYLENE GLYCOL 3350 17 GRAM(S): 17 POWDER, FOR SOLUTION ORAL at 21:47

## 2024-05-30 RX ADMIN — Medication 5 MILLIGRAM(S): at 21:49

## 2024-05-30 RX ADMIN — SODIUM CHLORIDE 125 MILLILITER(S): 9 INJECTION INTRAMUSCULAR; INTRAVENOUS; SUBCUTANEOUS at 02:47

## 2024-05-30 RX ADMIN — HYDROMORPHONE HYDROCHLORIDE 0.5 MILLIGRAM(S): 2 INJECTION INTRAMUSCULAR; INTRAVENOUS; SUBCUTANEOUS at 02:44

## 2024-05-30 RX ADMIN — Medication 10 MILLIGRAM(S): at 18:19

## 2024-05-30 RX ADMIN — HYDROMORPHONE HYDROCHLORIDE 4 MILLIGRAM(S): 2 INJECTION INTRAMUSCULAR; INTRAVENOUS; SUBCUTANEOUS at 10:32

## 2024-05-30 RX ADMIN — NALOXEGOL OXALATE 12.5 MILLIGRAM(S): 12.5 TABLET, FILM COATED ORAL at 10:34

## 2024-05-30 RX ADMIN — PANTOPRAZOLE SODIUM 40 MILLIGRAM(S): 20 TABLET, DELAYED RELEASE ORAL at 21:50

## 2024-05-30 RX ADMIN — SENNA PLUS 2 TABLET(S): 8.6 TABLET ORAL at 21:50

## 2024-05-30 RX ADMIN — HYDROMORPHONE HYDROCHLORIDE 4 MILLIGRAM(S): 2 INJECTION INTRAMUSCULAR; INTRAVENOUS; SUBCUTANEOUS at 18:39

## 2024-05-30 RX ADMIN — HYDROMORPHONE HYDROCHLORIDE 4 MILLIGRAM(S): 2 INJECTION INTRAMUSCULAR; INTRAVENOUS; SUBCUTANEOUS at 11:23

## 2024-05-30 RX ADMIN — SENNA PLUS 2 TABLET(S): 8.6 TABLET ORAL at 10:34

## 2024-05-30 RX ADMIN — Medication 10 MILLIGRAM(S): at 12:38

## 2024-05-30 RX ADMIN — Medication 1 GRAM(S): at 21:50

## 2024-05-30 NOTE — PROGRESS NOTE ADULT - ASSESSMENT
73 year old female with history pancreatic cancer s/p XRT 2022 hospitalized for pancreatitis, now with recurrent abdominal pain refractory to pain medication with on imaging prominent PD, edema adjacent to head/neck with normal LFTs and lipase, no leukocytosis.  MRCP shows pancreatitis and presumed stenosis of PD in neck with upstream dilation of the PD.    Imp:   Pancreatitis, no necrosis or drainable fluid collection with likely stenosis PD with no clinically improvement in pain  Constipation 2/2 narcotics, reduced PO intake, reduced mobility with some improvement as several loose stools yesterday    At this point    Rec:  ::Continue with PPI IVP  ::Start reglan 10 mg IV pre meals  ::Continue with bowel regimen:- bowel prep x 1 liter today  ::Clear liquids and if pain improves slow advancement to low fat diet    High risk for any instrumentation/procedure while pancreas is inflamed. Plan on outpatient followup for EUS/ ERCP evaluation stenosis/stricture 2/2 radiation versus malignancy with biopsy, possible stent.  73 year old female with history pancreatic cancer s/p XRT 2022 hospitalized for pancreatitis, now with recurrent abdominal pain refractory to pain medication with on imaging prominent PD, edema adjacent to head/neck with normal LFTs and lipase, no leukocytosis.  MRCP shows pancreatitis and presumed stenosis of PD in neck with upstream dilation of the PD.    Imp:   Pancreatitis, no necrosis or drainable fluid collection with likely stenosis PD with no clinically improvement in pain  Constipation 2/2 narcotics, reduced PO intake, reduced mobility with some improvement as several loose stools yesterday    At this point, after multidisciplinary discussion with heme/onc Dr. Perry- with review of outpatient CT imaging, may offer celiac block. Given patient's persistent pain, discussed with patient and daughter, if still in pain Monday, will go ahead with ERCP/PD stent/Nerve block. Both amenable to tentative plan. However, should pain melissa and patietn be able to eat, ok to discharge to home with plan for expeditious  planing as ambulatory procedure.    Rec:  ::Continue with PPI IVP  ::Start Reglan 10 mg IV pre meals prn  ::Pain control  ::Continue with bowel regimen:- bowel prep x 1 liter today  ::Advance and encourage diet as tolerated to low fat diet   73 year old female with history pancreatic cancer s/p XRT 2022 hospitalized for pancreatitis, now with recurrent abdominal pain refractory to pain medication with on imaging prominent PD, edema adjacent to head/neck with normal LFTs and lipase, no leukocytosis.  MRCP shows pancreatitis and  stenosis of PD in neck with upstream dilation of the PD.    Imp:   Pancreatitis, no necrosis or drainable fluid collection with likely stenosis PD with no clinically improvement in pain  Constipation 2/2 narcotics, reduced PO intake, reduced mobility with some improvement as several loose stools yesterday    At this point, after multidisciplinary discussion with heme/onc Dr. Perry- with review of outpatient CT imaging, may offer celiac block. Given patient's persistent pain, discussed with patient and daughter, if still in pain Monday, will go ahead with ERCP/PD stent/Neurolysis. Both amenable to tentative plan. However, should pain melissa and patient be able to eat, ok to discharge to home with plan for expeditious  planing as ambulatory procedure.    Rec:  ::Continue with PPI IVP  ::Start Reglan 10 mg IV pre meals prn  ::Pain control  ::Continue with bowel regimen:- bowel prep x 1 liter today  ::Advance and encourage diet as tolerated to low fat diet

## 2024-05-30 NOTE — PROGRESS NOTE ADULT - SUBJECTIVE AND OBJECTIVE BOX
73F w/ PMH of pancreatic cancer with mets, s/p local  radiation therapy, Radiation induced Pancreatitis, , CAD, stents, DMII, HLD p/w abdominal pain found to have Pancreatitis and Ileus .   s/p IV hydration   -s/p bowel prep and laxatives/enema for constipation     5.29: still c/o epigastric pain, had liquid BM  5.30: c/o epigastric pain, had BM          REVIEW OF SYSTEMS:    CONSTITUTIONAL: No weakness, No fevers or chills  ENT: No ear ache, No sorethroat  NECK: No pain, No stiffness  RESPIRATORY: No cough, No wheezing, No hemoptysis; No dyspnea  CARDIOVASCULAR: No chest pain, No palpitations  GASTROINTESTINAL: No abd pain, No nausea, No vomiting, No hematemesis, No diarrhea or constipation. No melena, No hematochezia.  GENITOURINARY: No dysuria, No  hematuria  NEUROLOGICAL: No diplopia, No paresthesia, No motor dysfunction  MUSCULOSKELETAL: No arthralgia, No myalgia  SKIN: No rashes, or lesions   PSYCH: no anxiety, no suicidal ideation    All other review of systems is negative unless indicated above    Vital Signs Last 24 Hrs  T(C): 37 (30 May 2024 09:04), Max: 37 (30 May 2024 09:04)  T(F): 98.6 (30 May 2024 09:04), Max: 98.6 (30 May 2024 09:04)  HR: 66 (30 May 2024 09:04) (66 - 81)  BP: 154/61 (30 May 2024 09:04) (141/57 - 167/54)  RR: 17 (30 May 2024 09:04) (17 - 18)  SpO2: 93% (30 May 2024 09:04) (93% - 97%)    Parameters below as of 30 May 2024 09:04  Patient On (Oxygen Delivery Method): room air          PHYSICAL EXAM:    GENERAL: NAD  HEENT:  NC/AT, EOMI, PERRLA, No scleral icterus, Moist mucous membranes  NECK: Supple, No JVD  CNS:  Alert & Oriented X3, Motor Strength 5/5 B/L upper and lower extremities; DTRs 2+ intact   LUNG: Normal Breath sounds, Clear to auscultation bilaterally, No rales, No rhonchi, No wheezing  HEART: RRR; No murmurs, No rubs  ABDOMEN: +BS, ST/ND, +TTP to epigastric area   GENITOURINARY: Voiding, Bladder not distended  EXTREMITIES:  2+ Peripheral Pulses, No clubbing, No cyanosis, No tibial edema  MUSCULOSKELTAL: Joints normal ROM, No TTP, No effusion  VAGINAL: deferred  SKIN: no rashes  RECTAL: deferred, not indicated  BREAST: deferred               Labs:                        10.4   3.83  )-----------( 246      ( 29 May 2024 06:50 )             31.7     05-29    139  |  112<H>  |  5<L>  ----------------------------<  119<H>  3.6   |  23  |  0.58    Ca    8.7      29 May 2024 06:50             Cultures:   MEDICATIONS  (STANDING):  aspirin enteric coated 81 milliGRAM(s) Oral daily  bisacodyl 5 milliGRAM(s) Oral at bedtime  clopidogrel Tablet 75 milliGRAM(s) Oral daily  enoxaparin Injectable 40 milliGRAM(s) SubCutaneous every 24 hours  glucagon  Injectable 1 milliGRAM(s) IntraMuscular once  insulin glargine Injectable (LANTUS) 5 Unit(s) SubCutaneous at bedtime  insulin lispro (ADMELOG) corrective regimen sliding scale   SubCutaneous three times a day before meals  naloxegol 12.5 milliGRAM(s) Oral daily  nystatin    Suspension 350168 Unit(s) Oral four times a day  pantoprazole  Injectable 40 milliGRAM(s) IV Push two times a day  polyethylene glycol 3350 17 Gram(s) Oral two times a day  senna 2 Tablet(s) Oral two times a day  sodium chloride 0.9%. 1000 milliLiter(s) (125 mL/Hr) IV Continuous <Continuous>  sucralfate suspension 1 Gram(s) Oral two times a day    MEDICATIONS  (PRN):  acetaminophen     Tablet .. 650 milliGRAM(s) Oral every 6 hours PRN Temp greater or equal to 38C (100.4F), Mild Pain (1 - 3)  aluminum hydroxide/magnesium hydroxide/simethicone Suspension 30 milliLiter(s) Oral every 4 hours PRN Dyspepsia  bisacodyl Suppository 10 milliGRAM(s) Rectal daily PRN Constipation  dextrose Oral Gel 15 Gram(s) Oral once PRN Blood Glucose LESS THAN 70 milliGRAM(s)/deciliter  HYDROmorphone   Tablet 4 milliGRAM(s) Oral every 4 hours PRN Severe Pain (7 - 10)  HYDROmorphone   Tablet 2 milliGRAM(s) Oral every 4 hours PRN Moderate Pain (4 - 6)  HYDROmorphone  Injectable 0.5 milliGRAM(s) IV Push every 3 hours PRN breakthrough pain  melatonin 3 milliGRAM(s) Oral at bedtime PRN Insomnia  ondansetron Injectable 4 milliGRAM(s) IV Push every 8 hours PRN Nausea and/or Vomiting  simethicone 80 milliGRAM(s) Chew three times a day PRN Gas      all labs reviewed  all imaging reviewed        1. Acute pancreatitis and subsequent  ileus  Etiology likely Radiation induced Pancreatitis and PD stenosis   Axay: improvement in ileus  MRCP: PD stenosis  will need ERCP with PD dilation next week   c/w IV hydration   analgesia w Dilaudid prn ; celiac nv block evaluation underway   stat Reglan, avoid Zofran due to constipation and ileus   -advance diet to regular   -PPI bid  -started on Movantik to prevent opiate induced constipation     2. T2DM  -lantus and sliding scale    3.  CAD s/p PCI  c/w aspirin , clopidogrel     PT eval: home    DVT proph:   	lovenox   Code Status: FULL code 	  dispo: discharge pending improvement in clinical status, advance diet and pain control, possible in the next 48-72 hrs    Outpatient follow-up:   with PMD

## 2024-05-30 NOTE — PROGRESS NOTE ADULT - SUBJECTIVE AND OBJECTIVE BOX
Patient is a 73y old  Female who presents with a chief complaint of abd pain    Followup: pancreatitis s/p radiation pancreatic cancer  At bedside, patient asleep but arousable. With daughter at bedside to provide collateral 24hour history. Patient with several loose stools yesterday with some relief in abdominal pain, which returned soon thereafter. On IV dilaudid ATC. Pain is localized to upper abdomen rather patient describes it as diffuse and "churn-like," with nausea pre/post attempt at eating- which has been limited to Ensures only as of now.    MEDICATIONS  (STANDING):  aspirin enteric coated 81 milliGRAM(s) Oral daily  bisacodyl 5 milliGRAM(s) Oral at bedtime  clopidogrel Tablet 75 milliGRAM(s) Oral daily  dextrose 10% Bolus 125 milliLiter(s) IV Bolus once  dextrose 5%. 1000 milliLiter(s) (100 mL/Hr) IV Continuous <Continuous>  dextrose 5%. 1000 milliLiter(s) (50 mL/Hr) IV Continuous <Continuous>  dextrose 50% Injectable 12.5 Gram(s) IV Push once  dextrose 50% Injectable 25 Gram(s) IV Push once  enoxaparin Injectable 40 milliGRAM(s) SubCutaneous every 24 hours  glucagon  Injectable 1 milliGRAM(s) IntraMuscular once  insulin glargine Injectable (LANTUS) 5 Unit(s) SubCutaneous at bedtime  insulin lispro (ADMELOG) corrective regimen sliding scale   SubCutaneous three times a day before meals  naloxegol 12.5 milliGRAM(s) Oral daily  nystatin    Suspension 522206 Unit(s) Oral four times a day  pantoprazole  Injectable 40 milliGRAM(s) IV Push two times a day  polyethylene glycol 3350 17 Gram(s) Oral two times a day  senna 2 Tablet(s) Oral two times a day  sodium chloride 0.9%. 1000 milliLiter(s) (125 mL/Hr) IV Continuous <Continuous>  sucralfate suspension 1 Gram(s) Oral two times a day    MEDICATIONS  (PRN):  acetaminophen     Tablet .. 650 milliGRAM(s) Oral every 6 hours PRN Temp greater or equal to 38C (100.4F), Mild Pain (1 - 3)  aluminum hydroxide/magnesium hydroxide/simethicone Suspension 30 milliLiter(s) Oral every 4 hours PRN Dyspepsia  bisacodyl Suppository 10 milliGRAM(s) Rectal daily PRN Constipation  dextrose Oral Gel 15 Gram(s) Oral once PRN Blood Glucose LESS THAN 70 milliGRAM(s)/deciliter  HYDROmorphone   Tablet 4 milliGRAM(s) Oral every 4 hours PRN Severe Pain (7 - 10)  HYDROmorphone   Tablet 2 milliGRAM(s) Oral every 4 hours PRN Moderate Pain (4 - 6)  HYDROmorphone  Injectable 0.5 milliGRAM(s) IV Push every 3 hours PRN breakthrough pain  melatonin 3 milliGRAM(s) Oral at bedtime PRN Insomnia  metoclopramide Injectable 10 milliGRAM(s) IV Push every 6 hours PRN pre-meals/nausea  ondansetron Injectable 4 milliGRAM(s) IV Push every 8 hours PRN Nausea and/or Vomiting  simethicone 80 milliGRAM(s) Chew three times a day PRN Gas      Vital Signs Last 24 Hrs  T(C): 37 (30 May 2024 09:04), Max: 37 (30 May 2024 09:04)  T(F): 98.6 (30 May 2024 09:04), Max: 98.6 (30 May 2024 09:04)  HR: 66 (30 May 2024 09:04) (66 - 81)  BP: 154/61 (30 May 2024 09:04) (141/57 - 167/54)  BP(mean): --  RR: 17 (30 May 2024 09:04) (17 - 18)  SpO2: 93% (30 May 2024 09:04) (93% - 97%)    Parameters below as of 30 May 2024 09:04  Patient On (Oxygen Delivery Method): room air      PHYSICAL EXAM:    Constitutional: No acute distress, ill appearing, non-toxic appearing  HEENT: masked, good phonation, not icteric  Neck: supple, no lymphadenopathy  Respiratory: clear to ascultation bilaterally, no wheezing  Cardiovascular: S1 and S2, regular rate and rhythm, no murmurs rubs or gallops  Gastrointestinal: soft, diffusely TTP, non-distended, +bowel sounds, no rebound or guarding, no surgical scars, no drains  Extremities: No peripheral edema, no cyanosis or clubbing  Vascular: 2+ peripheral pulses, no venous stasis  Neurological: A/O x 3, no focal deficits, no asterixis  Psychiatric: Normal mood, normal affect  Skin: No rashes, not jaundiced    LABS:                        10.4   3.83  )-----------( 246      ( 29 May 2024 06:50 )             31.7     05-29    139  |  112<H>  |  5<L>  ----------------------------<  119<H>  3.6   |  23  |  0.58    Ca    8.7      29 May 2024 06:50    RADIOLOGY & ADDITIONAL STUDIES:   Patient is a 73y old  Female who presents with a chief complaint of abd pain    Followup: pancreatitis s/p radiation and pancreatic cancer    At bedside, patient asleep but arousable. With daughter at bedside to provide collateral 24hour history. Patient with several loose stools yesterday with some relief in abdominal pain, which returned soon thereafter. On IV dilaudid ATC. Pain is localized to upper abdomen rather patient describes it as diffuse and "churn-like," with nausea pre/post attempt at eating- which has been limited to Ensures only as of now.    MEDICATIONS  (STANDING):  aspirin enteric coated 81 milliGRAM(s) Oral daily  bisacodyl 5 milliGRAM(s) Oral at bedtime  clopidogrel Tablet 75 milliGRAM(s) Oral daily  dextrose 10% Bolus 125 milliLiter(s) IV Bolus once  dextrose 5%. 1000 milliLiter(s) (100 mL/Hr) IV Continuous <Continuous>  dextrose 5%. 1000 milliLiter(s) (50 mL/Hr) IV Continuous <Continuous>  dextrose 50% Injectable 12.5 Gram(s) IV Push once  dextrose 50% Injectable 25 Gram(s) IV Push once  enoxaparin Injectable 40 milliGRAM(s) SubCutaneous every 24 hours  glucagon  Injectable 1 milliGRAM(s) IntraMuscular once  insulin glargine Injectable (LANTUS) 5 Unit(s) SubCutaneous at bedtime  insulin lispro (ADMELOG) corrective regimen sliding scale   SubCutaneous three times a day before meals  naloxegol 12.5 milliGRAM(s) Oral daily  nystatin    Suspension 877482 Unit(s) Oral four times a day  pantoprazole  Injectable 40 milliGRAM(s) IV Push two times a day  polyethylene glycol 3350 17 Gram(s) Oral two times a day  senna 2 Tablet(s) Oral two times a day  sodium chloride 0.9%. 1000 milliLiter(s) (125 mL/Hr) IV Continuous <Continuous>  sucralfate suspension 1 Gram(s) Oral two times a day    MEDICATIONS  (PRN):  acetaminophen     Tablet .. 650 milliGRAM(s) Oral every 6 hours PRN Temp greater or equal to 38C (100.4F), Mild Pain (1 - 3)  aluminum hydroxide/magnesium hydroxide/simethicone Suspension 30 milliLiter(s) Oral every 4 hours PRN Dyspepsia  bisacodyl Suppository 10 milliGRAM(s) Rectal daily PRN Constipation  dextrose Oral Gel 15 Gram(s) Oral once PRN Blood Glucose LESS THAN 70 milliGRAM(s)/deciliter  HYDROmorphone   Tablet 4 milliGRAM(s) Oral every 4 hours PRN Severe Pain (7 - 10)  HYDROmorphone   Tablet 2 milliGRAM(s) Oral every 4 hours PRN Moderate Pain (4 - 6)  HYDROmorphone  Injectable 0.5 milliGRAM(s) IV Push every 3 hours PRN breakthrough pain  melatonin 3 milliGRAM(s) Oral at bedtime PRN Insomnia  metoclopramide Injectable 10 milliGRAM(s) IV Push every 6 hours PRN pre-meals/nausea  ondansetron Injectable 4 milliGRAM(s) IV Push every 8 hours PRN Nausea and/or Vomiting  simethicone 80 milliGRAM(s) Chew three times a day PRN Gas      Vital Signs Last 24 Hrs  T(C): 37 (30 May 2024 09:04), Max: 37 (30 May 2024 09:04)  T(F): 98.6 (30 May 2024 09:04), Max: 98.6 (30 May 2024 09:04)  HR: 66 (30 May 2024 09:04) (66 - 81)  BP: 154/61 (30 May 2024 09:04) (141/57 - 167/54)  BP(mean): --  RR: 17 (30 May 2024 09:04) (17 - 18)  SpO2: 93% (30 May 2024 09:04) (93% - 97%)    Parameters below as of 30 May 2024 09:04  Patient On (Oxygen Delivery Method): room air      PHYSICAL EXAM:    Constitutional: No acute distress, ill appearing, frail,   HEENT:unmasked, good phonation, not icteric  Neck: supple, no lymphadenopathy  Respiratory: clear to ascultation bilaterally, no wheezing  Cardiovascular: S1 and S2, regular rate and rhythm, no murmurs rubs or gallops  Gastrointestinal: soft, diffusely TTP, non-distended, +bowel sounds, no rebound or guarding  Extremities: No peripheral edema, no cyanosis or clubbing  Vascular: 2+ peripheral pulses, no venous stasis  Neurological: A/O x 3, no focal deficits, no asterixis  Psychiatric: Normal mood, normal affect  Skin: No rashes, not jaundiced    LABS:                        10.4   3.83  )-----------( 246      ( 29 May 2024 06:50 )             31.7     05-29    139  |  112<H>  |  5<L>  ----------------------------<  119<H>  3.6   |  23  |  0.58    Ca    8.7      29 May 2024 06:50    RADIOLOGY & ADDITIONAL STUDIES: reviewed [facility] outside CT with Dr. Perry, has soft tissue infiltration of celiac

## 2024-05-30 NOTE — PROGRESS NOTE ADULT - ASSESSMENT
73F w/ PMH of pancreatic cancer local mets, on radiation therapy, from [facility], CAD, stents, DMII, HLD p/w abdominal pain and back pain.     # h/o Pancreatic adenocarcinoma   - pt not on any active treatment for h/o pancreatic ca   - discussed with daughter at bedside    # pancreatitis   - recent MRI ABDOMEN suggestive of acute interstitial edema consistent with pancreatitis NO evidence of recurrent disease NO pancreatic mass noted.   - discussed with GI and possible plan for proceeding with therapeutic celiac plexus block - GI evaluation appreciated   - patient is on DAPT will likely need to be held prior.   - continue with IVF     # r/o obstruction   - pt with significant constipation despite multiple enemas and treatment   - previous Abdominal flat plat suggestive of mild ileus - pt w/ more distension will repeat AXR today- on CLD, passing minimal gas, no BM despite mineral enema   -  now with likely opiod induced constipation     discussed w daughter at bedside     will follow daily and communicate with zahraa

## 2024-05-30 NOTE — PROGRESS NOTE ADULT - SUBJECTIVE AND OBJECTIVE BOX
INTERVAL HPI/OVERNIGHT EVENTS:  Patient S&E at bedside. No o/n events,   patient continues to have ongoing pain localized to epigastric region     VITAL SIGNS:  T(F): 98.6 (05-30-24 @ 16:00)  HR: 68 (05-30-24 @ 16:00)  BP: 137/54 (05-30-24 @ 16:00)  RR: 18 (05-30-24 @ 16:00)  SpO2: 94% (05-30-24 @ 16:00)  Wt(kg): --    PHYSICAL EXAM:    Constitutional: NAD, frail and weak   Eyes: EOMI, sclera non-icteric  Neck: supple, no masses, no JVD  Respiratory: CTA b/l, good air entry b/l  Cardiovascular: RRR, no M/R/G  Gastrointestinal: soft, epigastric etenderneass  Extremities: no c/c/e  Neurological: AAOx3      MEDICATIONS  (STANDING):  aspirin enteric coated 81 milliGRAM(s) Oral daily  bisacodyl 5 milliGRAM(s) Oral at bedtime  clopidogrel Tablet 75 milliGRAM(s) Oral daily  dextrose 10% Bolus 125 milliLiter(s) IV Bolus once  dextrose 5%. 1000 milliLiter(s) (100 mL/Hr) IV Continuous <Continuous>  dextrose 5%. 1000 milliLiter(s) (50 mL/Hr) IV Continuous <Continuous>  dextrose 50% Injectable 12.5 Gram(s) IV Push once  dextrose 50% Injectable 25 Gram(s) IV Push once  enoxaparin Injectable 40 milliGRAM(s) SubCutaneous every 24 hours  glucagon  Injectable 1 milliGRAM(s) IntraMuscular once  insulin glargine Injectable (LANTUS) 5 Unit(s) SubCutaneous at bedtime  insulin lispro (ADMELOG) corrective regimen sliding scale   SubCutaneous three times a day before meals  naloxegol 12.5 milliGRAM(s) Oral daily  nystatin    Suspension 168240 Unit(s) Oral four times a day  pantoprazole  Injectable 40 milliGRAM(s) IV Push two times a day  polyethylene glycol 3350 17 Gram(s) Oral two times a day  senna 2 Tablet(s) Oral two times a day  sodium chloride 0.9%. 1000 milliLiter(s) (125 mL/Hr) IV Continuous <Continuous>  sucralfate suspension 1 Gram(s) Oral two times a day    MEDICATIONS  (PRN):  acetaminophen     Tablet .. 650 milliGRAM(s) Oral every 6 hours PRN Temp greater or equal to 38C (100.4F), Mild Pain (1 - 3)  aluminum hydroxide/magnesium hydroxide/simethicone Suspension 30 milliLiter(s) Oral every 4 hours PRN Dyspepsia  bisacodyl Suppository 10 milliGRAM(s) Rectal daily PRN Constipation  dextrose Oral Gel 15 Gram(s) Oral once PRN Blood Glucose LESS THAN 70 milliGRAM(s)/deciliter  HYDROmorphone   Tablet 4 milliGRAM(s) Oral every 4 hours PRN Severe Pain (7 - 10)  HYDROmorphone   Tablet 2 milliGRAM(s) Oral every 4 hours PRN Moderate Pain (4 - 6)  HYDROmorphone  Injectable 0.5 milliGRAM(s) IV Push every 3 hours PRN breakthrough pain  melatonin 3 milliGRAM(s) Oral at bedtime PRN Insomnia  metoclopramide Injectable 10 milliGRAM(s) IV Push every 6 hours PRN pre-meals/nausea  ondansetron Injectable 4 milliGRAM(s) IV Push every 8 hours PRN Nausea and/or Vomiting  simethicone 80 milliGRAM(s) Chew three times a day PRN Gas      Allergies    IV Contrast (Short breath)    Intolerances        LABS:                        10.4   3.83  )-----------( 246      ( 29 May 2024 06:50 )             31.7     05-29    139  |  112<H>  |  5<L>  ----------------------------<  119<H>  3.6   |  23  |  0.58    Ca    8.7      29 May 2024 06:50        Urinalysis Basic - ( 29 May 2024 06:50 )    Color: x / Appearance: x / SG: x / pH: x  Gluc: 119 mg/dL / Ketone: x  / Bili: x / Urobili: x   Blood: x / Protein: x / Nitrite: x   Leuk Esterase: x / RBC: x / WBC x   Sq Epi: x / Non Sq Epi: x / Bacteria: x        RADIOLOGY & ADDITIONAL TESTS:  Studies reviewed.    ASSESSMENT & PLAN:

## 2024-05-31 LAB
ANION GAP SERPL CALC-SCNC: 6 MMOL/L — SIGNIFICANT CHANGE UP (ref 5–17)
BUN SERPL-MCNC: 7 MG/DL — SIGNIFICANT CHANGE UP (ref 7–23)
CALCIUM SERPL-MCNC: 8.4 MG/DL — LOW (ref 8.5–10.1)
CHLORIDE SERPL-SCNC: 111 MMOL/L — HIGH (ref 96–108)
CO2 SERPL-SCNC: 23 MMOL/L — SIGNIFICANT CHANGE UP (ref 22–31)
CREAT SERPL-MCNC: 0.52 MG/DL — SIGNIFICANT CHANGE UP (ref 0.5–1.3)
EGFR: 98 ML/MIN/1.73M2 — SIGNIFICANT CHANGE UP
GLUCOSE BLDC GLUCOMTR-MCNC: 115 MG/DL — HIGH (ref 70–99)
GLUCOSE BLDC GLUCOMTR-MCNC: 127 MG/DL — HIGH (ref 70–99)
GLUCOSE BLDC GLUCOMTR-MCNC: 132 MG/DL — HIGH (ref 70–99)
GLUCOSE BLDC GLUCOMTR-MCNC: 140 MG/DL — HIGH (ref 70–99)
GLUCOSE SERPL-MCNC: 150 MG/DL — HIGH (ref 70–99)
LIDOCAIN IGE QN: 9 U/L — LOW (ref 13–75)
POTASSIUM SERPL-MCNC: 3.1 MMOL/L — LOW (ref 3.5–5.3)
POTASSIUM SERPL-SCNC: 3.1 MMOL/L — LOW (ref 3.5–5.3)
SODIUM SERPL-SCNC: 140 MMOL/L — SIGNIFICANT CHANGE UP (ref 135–145)

## 2024-05-31 PROCEDURE — 99232 SBSQ HOSP IP/OBS MODERATE 35: CPT

## 2024-05-31 RX ORDER — LOSARTAN POTASSIUM 100 MG/1
25 TABLET, FILM COATED ORAL DAILY
Refills: 0 | Status: DISCONTINUED | OUTPATIENT
Start: 2024-05-31 | End: 2024-06-05

## 2024-05-31 RX ORDER — POTASSIUM CHLORIDE 20 MEQ
40 PACKET (EA) ORAL EVERY 4 HOURS
Refills: 0 | Status: COMPLETED | OUTPATIENT
Start: 2024-05-31 | End: 2024-05-31

## 2024-05-31 RX ADMIN — PANTOPRAZOLE SODIUM 40 MILLIGRAM(S): 20 TABLET, DELAYED RELEASE ORAL at 22:16

## 2024-05-31 RX ADMIN — SIMETHICONE 80 MILLIGRAM(S): 80 TABLET, CHEWABLE ORAL at 09:46

## 2024-05-31 RX ADMIN — POLYETHYLENE GLYCOL 3350 17 GRAM(S): 17 POWDER, FOR SOLUTION ORAL at 09:43

## 2024-05-31 RX ADMIN — Medication 10 MILLIGRAM(S): at 09:45

## 2024-05-31 RX ADMIN — SODIUM CHLORIDE 125 MILLILITER(S): 9 INJECTION INTRAMUSCULAR; INTRAVENOUS; SUBCUTANEOUS at 05:39

## 2024-05-31 RX ADMIN — HYDROMORPHONE HYDROCHLORIDE 4 MILLIGRAM(S): 2 INJECTION INTRAMUSCULAR; INTRAVENOUS; SUBCUTANEOUS at 05:41

## 2024-05-31 RX ADMIN — HYDROMORPHONE HYDROCHLORIDE 4 MILLIGRAM(S): 2 INJECTION INTRAMUSCULAR; INTRAVENOUS; SUBCUTANEOUS at 22:16

## 2024-05-31 RX ADMIN — Medication 1 GRAM(S): at 09:44

## 2024-05-31 RX ADMIN — SENNA PLUS 2 TABLET(S): 8.6 TABLET ORAL at 22:16

## 2024-05-31 RX ADMIN — CLOPIDOGREL BISULFATE 75 MILLIGRAM(S): 75 TABLET, FILM COATED ORAL at 09:42

## 2024-05-31 RX ADMIN — SODIUM CHLORIDE 125 MILLILITER(S): 9 INJECTION INTRAMUSCULAR; INTRAVENOUS; SUBCUTANEOUS at 00:46

## 2024-05-31 RX ADMIN — Medication 81 MILLIGRAM(S): at 22:16

## 2024-05-31 RX ADMIN — NALOXEGOL OXALATE 12.5 MILLIGRAM(S): 12.5 TABLET, FILM COATED ORAL at 09:43

## 2024-05-31 RX ADMIN — SIMETHICONE 80 MILLIGRAM(S): 80 TABLET, CHEWABLE ORAL at 17:06

## 2024-05-31 RX ADMIN — Medication 1 GRAM(S): at 22:17

## 2024-05-31 RX ADMIN — PANTOPRAZOLE SODIUM 40 MILLIGRAM(S): 20 TABLET, DELAYED RELEASE ORAL at 09:43

## 2024-05-31 RX ADMIN — HYDROMORPHONE HYDROCHLORIDE 4 MILLIGRAM(S): 2 INJECTION INTRAMUSCULAR; INTRAVENOUS; SUBCUTANEOUS at 10:40

## 2024-05-31 RX ADMIN — HYDROMORPHONE HYDROCHLORIDE 4 MILLIGRAM(S): 2 INJECTION INTRAMUSCULAR; INTRAVENOUS; SUBCUTANEOUS at 17:39

## 2024-05-31 RX ADMIN — HYDROMORPHONE HYDROCHLORIDE 4 MILLIGRAM(S): 2 INJECTION INTRAMUSCULAR; INTRAVENOUS; SUBCUTANEOUS at 14:48

## 2024-05-31 RX ADMIN — Medication 5 MILLIGRAM(S): at 22:16

## 2024-05-31 RX ADMIN — HYDROMORPHONE HYDROCHLORIDE 4 MILLIGRAM(S): 2 INJECTION INTRAMUSCULAR; INTRAVENOUS; SUBCUTANEOUS at 18:47

## 2024-05-31 RX ADMIN — SODIUM CHLORIDE 125 MILLILITER(S): 9 INJECTION INTRAMUSCULAR; INTRAVENOUS; SUBCUTANEOUS at 20:36

## 2024-05-31 RX ADMIN — SENNA PLUS 2 TABLET(S): 8.6 TABLET ORAL at 09:43

## 2024-05-31 RX ADMIN — HYDROMORPHONE HYDROCHLORIDE 4 MILLIGRAM(S): 2 INJECTION INTRAMUSCULAR; INTRAVENOUS; SUBCUTANEOUS at 09:43

## 2024-05-31 RX ADMIN — HYDROMORPHONE HYDROCHLORIDE 4 MILLIGRAM(S): 2 INJECTION INTRAMUSCULAR; INTRAVENOUS; SUBCUTANEOUS at 13:38

## 2024-05-31 RX ADMIN — POLYETHYLENE GLYCOL 3350 17 GRAM(S): 17 POWDER, FOR SOLUTION ORAL at 22:15

## 2024-05-31 RX ADMIN — Medication 40 MILLIEQUIVALENT(S): at 17:07

## 2024-05-31 RX ADMIN — HYDROMORPHONE HYDROCHLORIDE 0.5 MILLIGRAM(S): 2 INJECTION INTRAMUSCULAR; INTRAVENOUS; SUBCUTANEOUS at 00:45

## 2024-05-31 RX ADMIN — Medication 40 MILLIEQUIVALENT(S): at 13:38

## 2024-05-31 NOTE — PROGRESS NOTE ADULT - SUBJECTIVE AND OBJECTIVE BOX
73F w/ PMH of pancreatic cancer with mets, s/p local  radiation therapy, Radiation induced Pancreatitis, , CAD, stents, DMII, HLD p/w abdominal pain found to have Pancreatitis and Ileus .   s/p IV hydration   -s/p bowel prep and laxatives/enema for constipation     5.29: still c/o epigastric pain, had liquid BM  5.30: c/o epigastric pain, had BM  5.31: c/o less epigastric pain but requiring IV opiates ; had numerous BMs and passed large amount of gas         REVIEW OF SYSTEMS:    CONSTITUTIONAL: No weakness, No fevers or chills  ENT: No ear ache, No sorethroat  NECK: No pain, No stiffness  RESPIRATORY: No cough, No wheezing, No hemoptysis; No dyspnea  CARDIOVASCULAR: No chest pain, No palpitations  GASTROINTESTINAL: + abd pain, No nausea, No vomiting, No hematemesis, No diarrhea or constipation. No melena, No hematochezia.  GENITOURINARY: No dysuria, No  hematuria  NEUROLOGICAL: No diplopia, No paresthesia, No motor dysfunction  MUSCULOSKELETAL: No arthralgia, No myalgia  SKIN: No rashes, or lesions   PSYCH: no anxiety, no suicidal ideation    All other review of systems is negative unless indicated above    Vital Signs Last 24 Hrs  T(C): 36.6 (31 May 2024 08:45), Max: 37 (30 May 2024 16:00)  T(F): 97.8 (31 May 2024 08:45), Max: 98.6 (30 May 2024 16:00)  HR: 66 (31 May 2024 08:45) (66 - 75)  BP: 157/56 (31 May 2024 08:45) (137/54 - 157/56)  RR: 18 (31 May 2024 08:45) (18 - 18)  SpO2: 94% (31 May 2024 08:45) (93% - 94%)    Parameters below as of 31 May 2024 08:45  Patient On (Oxygen Delivery Method): room air              PHYSICAL EXAM:    GENERAL: NAD  HEENT:  NC/AT, EOMI, PERRLA, No scleral icterus, Moist mucous membranes  NECK: Supple, No JVD  CNS:  Alert & Oriented X3, Motor Strength 5/5 B/L upper and lower extremities; DTRs 2+ intact   LUNG: Normal Breath sounds, Clear to auscultation bilaterally, No rales, No rhonchi, No wheezing  HEART: RRR; No murmurs, No rubs  ABDOMEN: +BS, ST/ND, +TTP to epigastric area   GENITOURINARY: Voiding, Bladder not distended  EXTREMITIES:  2+ Peripheral Pulses, No clubbing, No cyanosis, No tibial edema  MUSCULOSKELTAL: Joints normal ROM, No TTP, No effusion  VAGINAL: deferred  SKIN: no rashes  RECTAL: deferred, not indicated  BREAST: deferred               Labs:    05-31    140  |  111<H>  |  7   ----------------------------<  150<H>  3.1<L>   |  23  |  0.52    Ca    8.4<L>      31 May 2024 06:09      Cultures:   MEDICATIONS  (STANDING):  aspirin enteric coated 81 milliGRAM(s) Oral daily  bisacodyl 5 milliGRAM(s) Oral at bedtime  clopidogrel Tablet 75 milliGRAM(s) Oral daily  enoxaparin Injectable 40 milliGRAM(s) SubCutaneous every 24 hours  glucagon  Injectable 1 milliGRAM(s) IntraMuscular once  insulin glargine Injectable (LANTUS) 5 Unit(s) SubCutaneous at bedtime  insulin lispro (ADMELOG) corrective regimen sliding scale   SubCutaneous three times a day before meals  naloxegol 12.5 milliGRAM(s) Oral daily  nystatin    Suspension 135135 Unit(s) Oral four times a day  pantoprazole  Injectable 40 milliGRAM(s) IV Push two times a day  polyethylene glycol 3350 17 Gram(s) Oral two times a day  senna 2 Tablet(s) Oral two times a day  sodium chloride 0.9%. 1000 milliLiter(s) (125 mL/Hr) IV Continuous <Continuous>  sucralfate suspension 1 Gram(s) Oral two times a day    MEDICATIONS  (PRN):  acetaminophen     Tablet .. 650 milliGRAM(s) Oral every 6 hours PRN Temp greater or equal to 38C (100.4F), Mild Pain (1 - 3)  aluminum hydroxide/magnesium hydroxide/simethicone Suspension 30 milliLiter(s) Oral every 4 hours PRN Dyspepsia  bisacodyl Suppository 10 milliGRAM(s) Rectal daily PRN Constipation  dextrose Oral Gel 15 Gram(s) Oral once PRN Blood Glucose LESS THAN 70 milliGRAM(s)/deciliter  HYDROmorphone   Tablet 4 milliGRAM(s) Oral every 4 hours PRN Severe Pain (7 - 10)  HYDROmorphone   Tablet 2 milliGRAM(s) Oral every 4 hours PRN Moderate Pain (4 - 6)  HYDROmorphone  Injectable 0.5 milliGRAM(s) IV Push every 3 hours PRN breakthrough pain  melatonin 3 milliGRAM(s) Oral at bedtime PRN Insomnia  ondansetron Injectable 4 milliGRAM(s) IV Push every 8 hours PRN Nausea and/or Vomiting  simethicone 80 milliGRAM(s) Chew three times a day PRN Gas      all labs reviewed  all imaging reviewed        1. 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 T2DM  -lantus and sliding scale    3.  CAD s/p PCI  c/w aspirin , clopidogrel     4. Htn: likey due to pain   c/w analgesia  will start ACEinh     Plan: ERCP and Celiac nv block Monday     DVT proph:   	lovenox   Code Status: FULL code 	  dispo: discharge pending improvement in clinical status, advance diet and pain control, possible in the next 48-72 hrs    Outpatient follow-up:   with PMD

## 2024-05-31 NOTE — PROGRESS NOTE ADULT - SUBJECTIVE AND OBJECTIVE BOX
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 Does still endorse abdominal pain with moderate relief from IV pain medications. Passing gas and liquid stool.      MEDICATIONS  (STANDING):  aspirin enteric coated 81 milliGRAM(s) Oral daily  bisacodyl 5 milliGRAM(s) Oral at bedtime  clopidogrel Tablet 75 milliGRAM(s) Oral daily  dextrose 10% Bolus 125 milliLiter(s) IV Bolus once  dextrose 5%. 1000 milliLiter(s) (100 mL/Hr) IV Continuous <Continuous>  dextrose 5%. 1000 milliLiter(s) (50 mL/Hr) IV Continuous <Continuous>  dextrose 50% Injectable 12.5 Gram(s) IV Push once  dextrose 50% Injectable 25 Gram(s) IV Push once  enoxaparin Injectable 40 milliGRAM(s) SubCutaneous every 24 hours  glucagon  Injectable 1 milliGRAM(s) IntraMuscular once  insulin glargine Injectable (LANTUS) 5 Unit(s) SubCutaneous at bedtime  insulin lispro (ADMELOG) corrective regimen sliding scale   SubCutaneous three times a day before meals  naloxegol 12.5 milliGRAM(s) Oral daily  nystatin    Suspension 576615 Unit(s) Oral four times a day  pantoprazole  Injectable 40 milliGRAM(s) IV Push two times a day  polyethylene glycol 3350 17 Gram(s) Oral two times a day  senna 2 Tablet(s) Oral two times a day  sodium chloride 0.9%. 1000 milliLiter(s) (125 mL/Hr) IV Continuous <Continuous>  sucralfate suspension 1 Gram(s) Oral two times a day    MEDICATIONS  (PRN):  acetaminophen     Tablet .. 650 milliGRAM(s) Oral every 6 hours PRN Temp greater or equal to 38C (100.4F), Mild Pain (1 - 3)  aluminum hydroxide/magnesium hydroxide/simethicone Suspension 30 milliLiter(s) Oral every 4 hours PRN Dyspepsia  bisacodyl Suppository 10 milliGRAM(s) Rectal daily PRN Constipation  dextrose Oral Gel 15 Gram(s) Oral once PRN Blood Glucose LESS THAN 70 milliGRAM(s)/deciliter  HYDROmorphone   Tablet 4 milliGRAM(s) Oral every 4 hours PRN Severe Pain (7 - 10)  HYDROmorphone   Tablet 2 milliGRAM(s) Oral every 4 hours PRN Moderate Pain (4 - 6)  HYDROmorphone  Injectable 0.5 milliGRAM(s) IV Push every 3 hours PRN breakthrough pain  melatonin 3 milliGRAM(s) Oral at bedtime PRN Insomnia  metoclopramide Injectable 10 milliGRAM(s) IV Push every 6 hours PRN pre-meals/nausea  ondansetron Injectable 4 milliGRAM(s) IV Push every 8 hours PRN Nausea and/or Vomiting  simethicone 80 milliGRAM(s) Chew three times a day PRN Gas      Vital Signs Last 24 Hrs  T(C): 36.6 (31 May 2024 08:45), Max: 37 (30 May 2024 16:00)  T(F): 97.8 (31 May 2024 08:45), Max: 98.6 (30 May 2024 16:00)  HR: 66 (31 May 2024 08:45) (66 - 75)  BP: 157/56 (31 May 2024 08:45) (137/54 - 157/56)  BP(mean): --  RR: 18 (31 May 2024 08:45) (18 - 18)  SpO2: 94% (31 May 2024 08:45) (93% - 94%)    Parameters below as of 31 May 2024 08:45  Patient On (Oxygen Delivery Method): room air        PHYSICAL EXAM:    Constitutional: No acute distress, non-toxic appearing  HEENT: masked, good phonation, not icteric  Neck: supple, no lymphadenopathy  Respiratory: clear to ascultation bilaterally, no wheezing  Cardiovascular: S1 and S2, regular rate and rhythm, no murmurs rubs or gallops  Gastrointestinal: soft, TTP, non-distended, +bowel sounds, no rebound or guarding, no surgical scars, no drains  Extremities: No peripheral edema, no cyanosis or clubbing  Vascular: 2+ peripheral pulses, no venous stasis  Neurological: A/O x 3, no focal deficits, no asterixis  Psychiatric: Normal mood, normal affect  Skin: No rashes, not jaundiced    LABS:    05-31    140  |  111<H>  |  7   ----------------------------<  150<H>  3.1<L>   |  23  |  0.52    Ca    8.4<L>      31 May 2024 06:09      RADIOLOGY & ADDITIONAL STUDIES: Patient is a 73y old  Female who presents with a chief complaint of abd pain    Followup: pancreatitis 2/2 pancreatic ductal stricture/stenosis  At bedside, patient sitting upright eating breakfast this morning (pancakes/strawberries/eggs) with reported increase in PO tolerance with pre-meal reglan. Does still endorse abdominal pain with moderate relief from IV pain medications. Passing gas and liquid stool.      MEDICATIONS  (STANDING):  aspirin enteric coated 81 milliGRAM(s) Oral daily  bisacodyl 5 milliGRAM(s) Oral at bedtime  clopidogrel Tablet 75 milliGRAM(s) Oral daily  dextrose 10% Bolus 125 milliLiter(s) IV Bolus once  dextrose 5%. 1000 milliLiter(s) (100 mL/Hr) IV Continuous <Continuous>  dextrose 5%. 1000 milliLiter(s) (50 mL/Hr) IV Continuous <Continuous>  dextrose 50% Injectable 12.5 Gram(s) IV Push once  dextrose 50% Injectable 25 Gram(s) IV Push once  enoxaparin Injectable 40 milliGRAM(s) SubCutaneous every 24 hours  glucagon  Injectable 1 milliGRAM(s) IntraMuscular once  insulin glargine Injectable (LANTUS) 5 Unit(s) SubCutaneous at bedtime  insulin lispro (ADMELOG) corrective regimen sliding scale   SubCutaneous three times a day before meals  naloxegol 12.5 milliGRAM(s) Oral daily  nystatin    Suspension 527805 Unit(s) Oral four times a day  pantoprazole  Injectable 40 milliGRAM(s) IV Push two times a day  polyethylene glycol 3350 17 Gram(s) Oral two times a day  senna 2 Tablet(s) Oral two times a day  sodium chloride 0.9%. 1000 milliLiter(s) (125 mL/Hr) IV Continuous <Continuous>  sucralfate suspension 1 Gram(s) Oral two times a day    MEDICATIONS  (PRN):  acetaminophen     Tablet .. 650 milliGRAM(s) Oral every 6 hours PRN Temp greater or equal to 38C (100.4F), Mild Pain (1 - 3)  aluminum hydroxide/magnesium hydroxide/simethicone Suspension 30 milliLiter(s) Oral every 4 hours PRN Dyspepsia  bisacodyl Suppository 10 milliGRAM(s) Rectal daily PRN Constipation  dextrose Oral Gel 15 Gram(s) Oral once PRN Blood Glucose LESS THAN 70 milliGRAM(s)/deciliter  HYDROmorphone   Tablet 4 milliGRAM(s) Oral every 4 hours PRN Severe Pain (7 - 10)  HYDROmorphone   Tablet 2 milliGRAM(s) Oral every 4 hours PRN Moderate Pain (4 - 6)  HYDROmorphone  Injectable 0.5 milliGRAM(s) IV Push every 3 hours PRN breakthrough pain  melatonin 3 milliGRAM(s) Oral at bedtime PRN Insomnia  metoclopramide Injectable 10 milliGRAM(s) IV Push every 6 hours PRN pre-meals/nausea  ondansetron Injectable 4 milliGRAM(s) IV Push every 8 hours PRN Nausea and/or Vomiting  simethicone 80 milliGRAM(s) Chew three times a day PRN Gas      Vital Signs Last 24 Hrs  T(C): 36.6 (31 May 2024 08:45), Max: 37 (30 May 2024 16:00)  T(F): 97.8 (31 May 2024 08:45), Max: 98.6 (30 May 2024 16:00)  HR: 66 (31 May 2024 08:45) (66 - 75)  BP: 157/56 (31 May 2024 08:45) (137/54 - 157/56)  BP(mean): --  RR: 18 (31 May 2024 08:45) (18 - 18)  SpO2: 94% (31 May 2024 08:45) (93% - 94%)    Parameters below as of 31 May 2024 08:45  Patient On (Oxygen Delivery Method): room air        PHYSICAL EXAM:    Constitutional: No acute distress, non-toxic appearing  HEENT: unmasked, good phonation, not icteric  Neck: supple, no lymphadenopathy  Respiratory: clear to ascultation bilaterally, no wheezing  Cardiovascular: S1 and S2, regular rate and rhythm, no murmurs rubs or gallops  Gastrointestinal: soft, TTP, non-distended, +bowel sounds, no rebound or guarding  Extremities: No peripheral edema, no cyanosis or clubbing  Vascular: 2+ peripheral pulses, no venous stasis  Neurological: A/O x 3, no focal deficits, no asterixis  Psychiatric: Normal mood, normal affect  Skin: No rashes, not jaundiced    LABS:    05-31    140  |  111<H>  |  7   ----------------------------<  150<H>  3.1<L>   |  23  |  0.52    Ca    8.4<L>      31 May 2024 06:09      RADIOLOGY & ADDITIONAL STUDIES:

## 2024-05-31 NOTE — PROGRESS NOTE ADULT - ASSESSMENT
73 year old female with history pancreatic cancer s/p XRT 2022 hospitalized for pancreatitis, now with recurrent abdominal pain refractory to pain medication with on imaging prominent PD, edema adjacent to head/neck with normal LFTs and lipase, no leukocytosis.  MRCP shows pancreatitis and  stenosis of PD in neck with upstream dilation of the PD.    Imp:   Abdominal pain due to pancreatitis 2/2 PD stricture due to malignancy or radiation induced versus compression celiac plexus via infiltration    Constipation 2/2 narcotics, reduced PO intake, reduced mobility     Patient eating this morning. Still on IV pain meds. +Stooling.    At this point, after multidisciplinary discussion with heme/onc Dr. Perry- with review of outpatient CT imaging, may offer celiac block. Given patient's persistent pain, discussed with patient and daughter, if still in pain Monday, will go ahead with ERCP/PD stent/Neurolysis. Both amenable to tentative plan. However, should pain melissa and patient be able to eat, ok to discharge to home with plan for expeditious planning as ambulatory procedure.    Rec:  ::Continue with PPI IVP  ::Continue Reglan 10 mg IV pre meals prn  ::Pain control  ::Continue with aggressive bowel regimen  :;Encourage ambulation/mobility  ::Encourage PO intake

## 2024-05-31 NOTE — PROGRESS NOTE ADULT - ASSESSMENT
73F w/ PMH of pancreatic cancer local mets, on radiation therapy, from [facility], CAD, stents, DMII, HLD p/w abdominal pain and back pain.     # h/o Pancreatic adenocarcinoma   - pt not on any active treatment for h/o pancreatic ca   - discussed with daughter at bedside    # pancreatitis   - recent MRI ABDOMEN suggestive of acute interstitial edema consistent with pancreatitis NO evidence of recurrent disease NO pancreatic mass noted.   - discussed with GI and possible plan for proceeding with therapeutic celiac plexus block and ERCP on Monday if pain continues   - patient is on DAPT will likely need to be held prior  - continue with IVF     # r/o obstruction   - pt with significant constipation despite multiple enemas and treatment   - previous Abdominal flat plat suggestive of mild ileus - repeat AXR w/o obstruction- now passing gas and bms   -  now with likely opiod induced constipation - responded to Movantik w/ bms and feeling better    discussed w daughter at bedside     will follow daily and communicate with zahraa

## 2024-05-31 NOTE — PROGRESS NOTE ADULT - SUBJECTIVE AND OBJECTIVE BOX
INTERVAL HPI/OVERNIGHT EVENTS:  Patient S&E at bedside.   Pt with daughter at bedside  states that she is feeling better today  had bms yesterday- movantik helping, passing gas   walked the floors yesterday  still with mild pain but has been taking po pain meds now   plan for ercp monday and possible celiac plexus block     PAST MEDICAL & SURGICAL HISTORY:  Coronary artery disease involving native coronary artery of native heart with unstable angina pectoris  s/p stents x 20      Type 2 diabetes mellitus without complication, without long-term current use of insulin      Hyperlipidemia, unspecified hyperlipidemia type      S/P cardiac cath      H/O lithotripsy          FAMILY HISTORY:      VITAL SIGNS:  T(F): 98.2 (05-31-24 @ 05:08)  HR: 75 (05-31-24 @ 05:08)  BP: 156/50 (05-31-24 @ 05:08)  RR: 18 (05-31-24 @ 05:08)  SpO2: 94% (05-31-24 @ 05:08)  Wt(kg): --    PHYSICAL EXAM:    Constitutional: NAD  Eyes: EOMI, sclera non-icteric  Neck: supple, no masses, no JVD  Respiratory: CTA b/l, good air entry b/l  Cardiovascular: RRR, no M/R/G  Gastrointestinal: soft, NTND, no masses palpable, + BS, no hepatosplenomegaly  Extremities: no c/c/e  Neurological: AAOx3      MEDICATIONS  (STANDING):  aspirin enteric coated 81 milliGRAM(s) Oral daily  bisacodyl 5 milliGRAM(s) Oral at bedtime  clopidogrel Tablet 75 milliGRAM(s) Oral daily  dextrose 10% Bolus 125 milliLiter(s) IV Bolus once  dextrose 5%. 1000 milliLiter(s) (100 mL/Hr) IV Continuous <Continuous>  dextrose 5%. 1000 milliLiter(s) (50 mL/Hr) IV Continuous <Continuous>  dextrose 50% Injectable 12.5 Gram(s) IV Push once  dextrose 50% Injectable 25 Gram(s) IV Push once  enoxaparin Injectable 40 milliGRAM(s) SubCutaneous every 24 hours  glucagon  Injectable 1 milliGRAM(s) IntraMuscular once  insulin glargine Injectable (LANTUS) 5 Unit(s) SubCutaneous at bedtime  insulin lispro (ADMELOG) corrective regimen sliding scale   SubCutaneous three times a day before meals  naloxegol 12.5 milliGRAM(s) Oral daily  nystatin    Suspension 556074 Unit(s) Oral four times a day  pantoprazole  Injectable 40 milliGRAM(s) IV Push two times a day  polyethylene glycol 3350 17 Gram(s) Oral two times a day  senna 2 Tablet(s) Oral two times a day  sodium chloride 0.9%. 1000 milliLiter(s) (125 mL/Hr) IV Continuous <Continuous>  sucralfate suspension 1 Gram(s) Oral two times a day    MEDICATIONS  (PRN):  acetaminophen     Tablet .. 650 milliGRAM(s) Oral every 6 hours PRN Temp greater or equal to 38C (100.4F), Mild Pain (1 - 3)  aluminum hydroxide/magnesium hydroxide/simethicone Suspension 30 milliLiter(s) Oral every 4 hours PRN Dyspepsia  bisacodyl Suppository 10 milliGRAM(s) Rectal daily PRN Constipation  dextrose Oral Gel 15 Gram(s) Oral once PRN Blood Glucose LESS THAN 70 milliGRAM(s)/deciliter  HYDROmorphone   Tablet 4 milliGRAM(s) Oral every 4 hours PRN Severe Pain (7 - 10)  HYDROmorphone   Tablet 2 milliGRAM(s) Oral every 4 hours PRN Moderate Pain (4 - 6)  HYDROmorphone  Injectable 0.5 milliGRAM(s) IV Push every 3 hours PRN breakthrough pain  melatonin 3 milliGRAM(s) Oral at bedtime PRN Insomnia  metoclopramide Injectable 10 milliGRAM(s) IV Push every 6 hours PRN pre-meals/nausea  ondansetron Injectable 4 milliGRAM(s) IV Push every 8 hours PRN Nausea and/or Vomiting  simethicone 80 milliGRAM(s) Chew three times a day PRN Gas      Allergies    IV Contrast (Short breath)    Intolerances        LABS:    05-31    140  |  111<H>  |  7   ----------------------------<  150<H>  3.1<L>   |  23  |  0.52    Ca    8.4<L>      31 May 2024 06:09        Urinalysis Basic - ( 31 May 2024 06:09 )    Color: x / Appearance: x / SG: x / pH: x  Gluc: 150 mg/dL / Ketone: x  / Bili: x / Urobili: x   Blood: x / Protein: x / Nitrite: x   Leuk Esterase: x / RBC: x / WBC x   Sq Epi: x / Non Sq Epi: x / Bacteria: x        RADIOLOGY & ADDITIONAL TESTS:  Studies reviewed.

## 2024-06-01 LAB
ANION GAP SERPL CALC-SCNC: 6 MMOL/L — SIGNIFICANT CHANGE UP (ref 5–17)
BUN SERPL-MCNC: 6 MG/DL — LOW (ref 7–23)
CALCIUM SERPL-MCNC: 8.8 MG/DL — SIGNIFICANT CHANGE UP (ref 8.5–10.1)
CHLORIDE SERPL-SCNC: 109 MMOL/L — HIGH (ref 96–108)
CO2 SERPL-SCNC: 24 MMOL/L — SIGNIFICANT CHANGE UP (ref 22–31)
CREAT SERPL-MCNC: 0.55 MG/DL — SIGNIFICANT CHANGE UP (ref 0.5–1.3)
EGFR: 97 ML/MIN/1.73M2 — SIGNIFICANT CHANGE UP
GLUCOSE BLDC GLUCOMTR-MCNC: 126 MG/DL — HIGH (ref 70–99)
GLUCOSE BLDC GLUCOMTR-MCNC: 128 MG/DL — HIGH (ref 70–99)
GLUCOSE BLDC GLUCOMTR-MCNC: 140 MG/DL — HIGH (ref 70–99)
GLUCOSE BLDC GLUCOMTR-MCNC: 141 MG/DL — HIGH (ref 70–99)
GLUCOSE SERPL-MCNC: 138 MG/DL — HIGH (ref 70–99)
HCT VFR BLD CALC: 31.5 % — LOW (ref 34.5–45)
HGB BLD-MCNC: 10.5 G/DL — LOW (ref 11.5–15.5)
MCHC RBC-ENTMCNC: 29.3 PG — SIGNIFICANT CHANGE UP (ref 27–34)
MCHC RBC-ENTMCNC: 33.3 GM/DL — SIGNIFICANT CHANGE UP (ref 32–36)
MCV RBC AUTO: 88 FL — SIGNIFICANT CHANGE UP (ref 80–100)
PLATELET # BLD AUTO: 274 K/UL — SIGNIFICANT CHANGE UP (ref 150–400)
POTASSIUM SERPL-MCNC: 3.5 MMOL/L — SIGNIFICANT CHANGE UP (ref 3.5–5.3)
POTASSIUM SERPL-SCNC: 3.5 MMOL/L — SIGNIFICANT CHANGE UP (ref 3.5–5.3)
RBC # BLD: 3.58 M/UL — LOW (ref 3.8–5.2)
RBC # FLD: 13.3 % — SIGNIFICANT CHANGE UP (ref 10.3–14.5)
SODIUM SERPL-SCNC: 139 MMOL/L — SIGNIFICANT CHANGE UP (ref 135–145)
WBC # BLD: 4.5 K/UL — SIGNIFICANT CHANGE UP (ref 3.8–10.5)
WBC # FLD AUTO: 4.5 K/UL — SIGNIFICANT CHANGE UP (ref 3.8–10.5)

## 2024-06-01 PROCEDURE — 99232 SBSQ HOSP IP/OBS MODERATE 35: CPT

## 2024-06-01 RX ADMIN — Medication 1 GRAM(S): at 10:15

## 2024-06-01 RX ADMIN — HYDROMORPHONE HYDROCHLORIDE 0.5 MILLIGRAM(S): 2 INJECTION INTRAMUSCULAR; INTRAVENOUS; SUBCUTANEOUS at 09:05

## 2024-06-01 RX ADMIN — POLYETHYLENE GLYCOL 3350 17 GRAM(S): 17 POWDER, FOR SOLUTION ORAL at 21:31

## 2024-06-01 RX ADMIN — SENNA PLUS 2 TABLET(S): 8.6 TABLET ORAL at 12:16

## 2024-06-01 RX ADMIN — HYDROMORPHONE HYDROCHLORIDE 4 MILLIGRAM(S): 2 INJECTION INTRAMUSCULAR; INTRAVENOUS; SUBCUTANEOUS at 11:02

## 2024-06-01 RX ADMIN — SODIUM CHLORIDE 125 MILLILITER(S): 9 INJECTION INTRAMUSCULAR; INTRAVENOUS; SUBCUTANEOUS at 19:53

## 2024-06-01 RX ADMIN — HYDROMORPHONE HYDROCHLORIDE 0.5 MILLIGRAM(S): 2 INJECTION INTRAMUSCULAR; INTRAVENOUS; SUBCUTANEOUS at 17:13

## 2024-06-01 RX ADMIN — HYDROMORPHONE HYDROCHLORIDE 4 MILLIGRAM(S): 2 INJECTION INTRAMUSCULAR; INTRAVENOUS; SUBCUTANEOUS at 03:52

## 2024-06-01 RX ADMIN — PANTOPRAZOLE SODIUM 40 MILLIGRAM(S): 20 TABLET, DELAYED RELEASE ORAL at 21:30

## 2024-06-01 RX ADMIN — HYDROMORPHONE HYDROCHLORIDE 0.5 MILLIGRAM(S): 2 INJECTION INTRAMUSCULAR; INTRAVENOUS; SUBCUTANEOUS at 01:58

## 2024-06-01 RX ADMIN — INSULIN GLARGINE 5 UNIT(S): 100 INJECTION, SOLUTION SUBCUTANEOUS at 21:41

## 2024-06-01 RX ADMIN — HYDROMORPHONE HYDROCHLORIDE 0.5 MILLIGRAM(S): 2 INJECTION INTRAMUSCULAR; INTRAVENOUS; SUBCUTANEOUS at 01:28

## 2024-06-01 RX ADMIN — HYDROMORPHONE HYDROCHLORIDE 4 MILLIGRAM(S): 2 INJECTION INTRAMUSCULAR; INTRAVENOUS; SUBCUTANEOUS at 19:24

## 2024-06-01 RX ADMIN — CLOPIDOGREL BISULFATE 75 MILLIGRAM(S): 75 TABLET, FILM COATED ORAL at 12:16

## 2024-06-01 RX ADMIN — NALOXEGOL OXALATE 12.5 MILLIGRAM(S): 12.5 TABLET, FILM COATED ORAL at 10:15

## 2024-06-01 RX ADMIN — HYDROMORPHONE HYDROCHLORIDE 4 MILLIGRAM(S): 2 INJECTION INTRAMUSCULAR; INTRAVENOUS; SUBCUTANEOUS at 04:22

## 2024-06-01 RX ADMIN — HYDROMORPHONE HYDROCHLORIDE 4 MILLIGRAM(S): 2 INJECTION INTRAMUSCULAR; INTRAVENOUS; SUBCUTANEOUS at 10:17

## 2024-06-01 RX ADMIN — Medication 81 MILLIGRAM(S): at 21:30

## 2024-06-01 RX ADMIN — HYDROMORPHONE HYDROCHLORIDE 0.5 MILLIGRAM(S): 2 INJECTION INTRAMUSCULAR; INTRAVENOUS; SUBCUTANEOUS at 08:31

## 2024-06-01 RX ADMIN — HYDROMORPHONE HYDROCHLORIDE 4 MILLIGRAM(S): 2 INJECTION INTRAMUSCULAR; INTRAVENOUS; SUBCUTANEOUS at 15:19

## 2024-06-01 RX ADMIN — SODIUM CHLORIDE 125 MILLILITER(S): 9 INJECTION INTRAMUSCULAR; INTRAVENOUS; SUBCUTANEOUS at 04:30

## 2024-06-01 RX ADMIN — HYDROMORPHONE HYDROCHLORIDE 4 MILLIGRAM(S): 2 INJECTION INTRAMUSCULAR; INTRAVENOUS; SUBCUTANEOUS at 20:02

## 2024-06-01 RX ADMIN — HYDROMORPHONE HYDROCHLORIDE 4 MILLIGRAM(S): 2 INJECTION INTRAMUSCULAR; INTRAVENOUS; SUBCUTANEOUS at 23:46

## 2024-06-01 RX ADMIN — HYDROMORPHONE HYDROCHLORIDE 4 MILLIGRAM(S): 2 INJECTION INTRAMUSCULAR; INTRAVENOUS; SUBCUTANEOUS at 14:18

## 2024-06-01 RX ADMIN — Medication 5 MILLIGRAM(S): at 21:31

## 2024-06-01 RX ADMIN — PANTOPRAZOLE SODIUM 40 MILLIGRAM(S): 20 TABLET, DELAYED RELEASE ORAL at 10:15

## 2024-06-01 RX ADMIN — SIMETHICONE 80 MILLIGRAM(S): 80 TABLET, CHEWABLE ORAL at 10:15

## 2024-06-01 RX ADMIN — HYDROMORPHONE HYDROCHLORIDE 0.5 MILLIGRAM(S): 2 INJECTION INTRAMUSCULAR; INTRAVENOUS; SUBCUTANEOUS at 17:40

## 2024-06-01 RX ADMIN — POLYETHYLENE GLYCOL 3350 17 GRAM(S): 17 POWDER, FOR SOLUTION ORAL at 10:15

## 2024-06-01 RX ADMIN — SODIUM CHLORIDE 125 MILLILITER(S): 9 INJECTION INTRAMUSCULAR; INTRAVENOUS; SUBCUTANEOUS at 12:30

## 2024-06-01 RX ADMIN — Medication 1 GRAM(S): at 21:31

## 2024-06-01 RX ADMIN — SENNA PLUS 2 TABLET(S): 8.6 TABLET ORAL at 21:30

## 2024-06-01 NOTE — PROGRESS NOTE ADULT - ASSESSMENT
73F w/ PMH of pancreatic cancer local mets, on radiation therapy, from [facility], CAD, stents, DMII, HLD p/w abdominal pain and back pain.     # h/o Pancreatic adenocarcinoma   - pt not on any active treatment for h/o pancreatic ca   - discussed with daughter at bedside    # pancreatitis   - recent MRI ABDOMEN suggestive of acute interstitial edema consistent with pancreatitis NO evidence of recurrent disease NO pancreatic mass noted.   - discussed with GI and possible plan for proceeding with therapeutic celiac plexus block and ERCP on Monday if pain continues   - patient is on DAPT will need to be held prior  - ppi and reglan    # r/o obstruction   - pt with significant constipation despite multiple enemas and treatment   - previous Abdominal flat plat suggestive of mild ileus - repeat AXR w/o obstruction  -  now with likely opiod induced constipation - responded to Movantik w/ bms and feeling better- had 1 bm yesterday     discussed w daughter at bedside     will follow daily and communicate with zahraa

## 2024-06-01 NOTE — PROGRESS NOTE ADULT - ASSESSMENT
73F w/ PMH of pancreatic cancer with mets, s/p local  radiation therapy, Radiation induced Pancreatitis, , CAD, stents, DMII, HLD p/w abdominal pain found to have Pancreatitis and Ileus .    #  Acute pancreatitis and subsequent  ileus  - Etiology likely Radiation induced Pancreatitis and PD stenosis   - MRCP: PD stenosis  - will need ERCP with PD dilation Monday   - c/w IV hydration gently   - Pain control  - Celiac block possibly monday  -advance diet to regular   -PPI bid    #Constipation  - Continue movantik     # T2DM  -lantus and sliding scale    # CAD s/p PCI  - c/w aspirin   - clopidogrel     # HTN  - Stable  - Continue lisinopril     # DVT prophylaxis  - Lovenox    Plan: ERCP and Celiac nv block Monday

## 2024-06-01 NOTE — PROGRESS NOTE ADULT - SUBJECTIVE AND OBJECTIVE BOX
INTERVAL HPI/OVERNIGHT EVENTS:  Patient S&E at bedside. No o/n events,     PAST MEDICAL & SURGICAL HISTORY:  Coronary artery disease involving native coronary artery of native heart with unstable angina pectoris  s/p stents x 20      Type 2 diabetes mellitus without complication, without long-term current use of insulin      Hyperlipidemia, unspecified hyperlipidemia type      S/P cardiac cath      H/O lithotripsy          FAMILY HISTORY:      VITAL SIGNS:  T(F): 98.8 (06-01-24 @ 09:00)  HR: 73 (06-01-24 @ 09:00)  BP: 153/62 (06-01-24 @ 09:00)  RR: 18 (06-01-24 @ 09:00)  SpO2: 95% (06-01-24 @ 09:00)  Wt(kg): --    PHYSICAL EXAM:  Constitutional: NAD  Eyes: EOMI,   Respiratory: CTA b/l,   Cardiovascular: RRR,   Gastrointestinal: soft, mild pain and distension still on deep palpation   Neurological: AAOx3    MEDICATIONS  (STANDING):  aspirin enteric coated 81 milliGRAM(s) Oral daily  bisacodyl 5 milliGRAM(s) Oral at bedtime  clopidogrel Tablet 75 milliGRAM(s) Oral daily  dextrose 10% Bolus 125 milliLiter(s) IV Bolus once  dextrose 5%. 1000 milliLiter(s) (50 mL/Hr) IV Continuous <Continuous>  dextrose 5%. 1000 milliLiter(s) (100 mL/Hr) IV Continuous <Continuous>  dextrose 50% Injectable 12.5 Gram(s) IV Push once  dextrose 50% Injectable 25 Gram(s) IV Push once  enoxaparin Injectable 40 milliGRAM(s) SubCutaneous every 24 hours  glucagon  Injectable 1 milliGRAM(s) IntraMuscular once  insulin glargine Injectable (LANTUS) 5 Unit(s) SubCutaneous at bedtime  insulin lispro (ADMELOG) corrective regimen sliding scale   SubCutaneous three times a day before meals  losartan 25 milliGRAM(s) Oral daily  naloxegol 12.5 milliGRAM(s) Oral daily  nystatin    Suspension [id number] Unit(s) Oral four times a day  pantoprazole  Injectable 40 milliGRAM(s) IV Push two times a day  polyethylene glycol 3350 17 Gram(s) Oral two times a day  senna 2 Tablet(s) Oral two times a day  sodium chloride 0.9%. 1000 milliLiter(s) (125 mL/Hr) IV Continuous <Continuous>  sucralfate suspension 1 Gram(s) Oral two times a day    MEDICATIONS  (PRN):  acetaminophen     Tablet .. 650 milliGRAM(s) Oral every 6 hours PRN Temp greater or equal to 38C (100.4F), Mild Pain (1 - 3)  aluminum hydroxide/magnesium hydroxide/simethicone Suspension 30 milliLiter(s) Oral every 4 hours PRN Dyspepsia  bisacodyl Suppository 10 milliGRAM(s) Rectal daily PRN Constipation  dextrose Oral Gel 15 Gram(s) Oral once PRN Blood Glucose LESS THAN 70 milliGRAM(s)/deciliter  HYDROmorphone   Tablet 4 milliGRAM(s) Oral every 4 hours PRN Severe Pain (7 - 10)  HYDROmorphone   Tablet 2 milliGRAM(s) Oral every 4 hours PRN Moderate Pain (4 - 6)  HYDROmorphone  Injectable 0.5 milliGRAM(s) IV Push every 3 hours PRN breakthrough pain  melatonin 3 milliGRAM(s) Oral at bedtime PRN Insomnia  metoclopramide Injectable 10 milliGRAM(s) IV Push every 6 hours PRN pre-meals/nausea  ondansetron Injectable 4 milliGRAM(s) IV Push every 8 hours PRN Nausea and/or Vomiting  simethicone 80 milliGRAM(s) Chew three times a day PRN Gas      Allergies    IV Contrast (Short breath)    Intolerances        LABS:                        10.5   4.50  )-----------( 274      ( 01 Jun 2024 06:40 )             31.5     06-01    139  |  109<H>  |  6<L>  ----------------------------<  138<H>  3.5   |  24  |  0.55    Ca    8.8      01 Jun 2024 06:40        Urinalysis Basic - ( 01 Jun 2024 06:40 )    Color: x / Appearance: x / SG: x / pH: x  Gluc: 138 mg/dL / Ketone: x  / Bili: x / Urobili: x   Blood: x / Protein: x / Nitrite: x   Leuk Esterase: x / RBC: x / WBC x   Sq Epi: x / Non Sq Epi: x / Bacteria: x        RADIOLOGY & ADDITIONAL TESTS:  Studies reviewed.

## 2024-06-01 NOTE — PROGRESS NOTE ADULT - SUBJECTIVE AND OBJECTIVE BOX
HOSPITALIST ATTENDING PROGRESS NOTE    Chart and meds reviewed.  Patient seen and examined.    CC: Abd pain    Subjective: Still with abd pain. No fever    All other systems reviewed and found to be negative with the exception of what has been described above.    MEDICATIONS  (STANDING):  aspirin enteric coated 81 milliGRAM(s) Oral daily  bisacodyl 5 milliGRAM(s) Oral at bedtime  clopidogrel Tablet 75 milliGRAM(s) Oral daily  dextrose 10% Bolus 125 milliLiter(s) IV Bolus once  dextrose 5%. 1000 milliLiter(s) (50 mL/Hr) IV Continuous <Continuous>  dextrose 5%. 1000 milliLiter(s) (100 mL/Hr) IV Continuous <Continuous>  dextrose 50% Injectable 12.5 Gram(s) IV Push once  dextrose 50% Injectable 25 Gram(s) IV Push once  enoxaparin Injectable 40 milliGRAM(s) SubCutaneous every 24 hours  glucagon  Injectable 1 milliGRAM(s) IntraMuscular once  insulin glargine Injectable (LANTUS) 5 Unit(s) SubCutaneous at bedtime  insulin lispro (ADMELOG) corrective regimen sliding scale   SubCutaneous three times a day before meals  losartan 25 milliGRAM(s) Oral daily  naloxegol 12.5 milliGRAM(s) Oral daily  nystatin    Suspension [id number] Unit(s) Oral four times a day  pantoprazole  Injectable 40 milliGRAM(s) IV Push two times a day  polyethylene glycol 3350 17 Gram(s) Oral two times a day  senna 2 Tablet(s) Oral two times a day  sodium chloride 0.9%. 1000 milliLiter(s) (125 mL/Hr) IV Continuous <Continuous>  sucralfate suspension 1 Gram(s) Oral two times a day    MEDICATIONS  (PRN):  acetaminophen     Tablet .. 650 milliGRAM(s) Oral every 6 hours PRN Temp greater or equal to 38C (100.4F), Mild Pain (1 - 3)  aluminum hydroxide/magnesium hydroxide/simethicone Suspension 30 milliLiter(s) Oral every 4 hours PRN Dyspepsia  bisacodyl Suppository 10 milliGRAM(s) Rectal daily PRN Constipation  dextrose Oral Gel 15 Gram(s) Oral once PRN Blood Glucose LESS THAN 70 milliGRAM(s)/deciliter  HYDROmorphone   Tablet 4 milliGRAM(s) Oral every 4 hours PRN Severe Pain (7 - 10)  HYDROmorphone   Tablet 2 milliGRAM(s) Oral every 4 hours PRN Moderate Pain (4 - 6)  HYDROmorphone  Injectable 0.5 milliGRAM(s) IV Push every 3 hours PRN breakthrough pain  melatonin 3 milliGRAM(s) Oral at bedtime PRN Insomnia  metoclopramide Injectable 10 milliGRAM(s) IV Push every 6 hours PRN pre-meals/nausea  ondansetron Injectable 4 milliGRAM(s) IV Push every 8 hours PRN Nausea and/or Vomiting  simethicone 80 milliGRAM(s) Chew three times a day PRN Gas    Vital Signs Last 24 Hrs  T(C): 37.1 (01 Jun 2024 09:00), Max: 37.1 (01 Jun 2024 09:00)  T(F): 98.8 (01 Jun 2024 09:00), Max: 98.8 (01 Jun 2024 09:00)  HR: 73 (01 Jun 2024 09:00) (73 - 87)  BP: 153/62 (01 Jun 2024 09:00) (133/76 - 156/62)  BP(mean): --  RR: 18 (01 Jun 2024 09:00) (18 - 18)  SpO2: 95% (01 Jun 2024 09:00) (95% - 97%)    Parameters below as of 01 Jun 2024 09:00  Patient On (Oxygen Delivery Method): room air    GEN: NAD  HEENT:  pupils equal and reactive, EOMI, no oropharyngeal lesions, erythema, exudates, oral thrush  NECK:   supple, no carotid bruits  CV:  +S1, +S2, regular, no murmurs  RESP:   lungs clear to auscultation bilaterally, no wheezing, rales, rhonchi, good air entry bilaterally  GI:  abdomen soft, non-tender, non-distended, normal BS  EXT:  no clubbing, no cyanosis, no edema, no calf pain, swelling or erythema  NEURO:  AAOX3, no focal neurological deficits, follows all commands, able to move extremities spontaneously  SKIN:  no ulcers, lesions or rashes    LABS:                          10.5   4.50  )-----------( 274      ( 01 Jun 2024 06:40 )             31.5     06-01    139  |  109<H>  |  6<L>  ----------------------------<  138<H>  3.5   |  24  |  0.55    Ca    8.8      01 Jun 2024 06:40    Urinalysis Basic - ( 01 Jun 2024 06:40 )  Color: x / Appearance: x / SG: x / pH: x  Gluc: 138 mg/dL / Ketone: x  / Bili: x / Urobili: x   Blood: x / Protein: x / Nitrite: x   Leuk Esterase: x / RBC: x / WBC x   Sq Epi: x / Non Sq Epi: x / Bacteria: x

## 2024-06-02 LAB
ALBUMIN SERPL ELPH-MCNC: 2.8 G/DL — LOW (ref 3.3–5)
ALP SERPL-CCNC: 66 U/L — SIGNIFICANT CHANGE UP (ref 40–120)
ALT FLD-CCNC: 15 U/L — SIGNIFICANT CHANGE UP (ref 12–78)
ANION GAP SERPL CALC-SCNC: 8 MMOL/L — SIGNIFICANT CHANGE UP (ref 5–17)
AST SERPL-CCNC: 11 U/L — LOW (ref 15–37)
BILIRUB SERPL-MCNC: 0.7 MG/DL — SIGNIFICANT CHANGE UP (ref 0.2–1.2)
BUN SERPL-MCNC: 6 MG/DL — LOW (ref 7–23)
CALCIUM SERPL-MCNC: 8.8 MG/DL — SIGNIFICANT CHANGE UP (ref 8.5–10.1)
CHLORIDE SERPL-SCNC: 108 MMOL/L — SIGNIFICANT CHANGE UP (ref 96–108)
CO2 SERPL-SCNC: 24 MMOL/L — SIGNIFICANT CHANGE UP (ref 22–31)
CREAT SERPL-MCNC: 0.5 MG/DL — SIGNIFICANT CHANGE UP (ref 0.5–1.3)
EGFR: 99 ML/MIN/1.73M2 — SIGNIFICANT CHANGE UP
GLUCOSE BLDC GLUCOMTR-MCNC: 117 MG/DL — HIGH (ref 70–99)
GLUCOSE BLDC GLUCOMTR-MCNC: 148 MG/DL — HIGH (ref 70–99)
GLUCOSE BLDC GLUCOMTR-MCNC: 154 MG/DL — HIGH (ref 70–99)
GLUCOSE BLDC GLUCOMTR-MCNC: 188 MG/DL — HIGH (ref 70–99)
GLUCOSE SERPL-MCNC: 140 MG/DL — HIGH (ref 70–99)
HCT VFR BLD CALC: 31.4 % — LOW (ref 34.5–45)
HGB BLD-MCNC: 10.4 G/DL — LOW (ref 11.5–15.5)
MCHC RBC-ENTMCNC: 28.8 PG — SIGNIFICANT CHANGE UP (ref 27–34)
MCHC RBC-ENTMCNC: 33.1 GM/DL — SIGNIFICANT CHANGE UP (ref 32–36)
MCV RBC AUTO: 87 FL — SIGNIFICANT CHANGE UP (ref 80–100)
PLATELET # BLD AUTO: 297 K/UL — SIGNIFICANT CHANGE UP (ref 150–400)
POTASSIUM SERPL-MCNC: 3 MMOL/L — LOW (ref 3.5–5.3)
POTASSIUM SERPL-SCNC: 3 MMOL/L — LOW (ref 3.5–5.3)
PROT SERPL-MCNC: 5.8 GM/DL — LOW (ref 6–8.3)
RBC # BLD: 3.61 M/UL — LOW (ref 3.8–5.2)
RBC # FLD: 13.3 % — SIGNIFICANT CHANGE UP (ref 10.3–14.5)
SODIUM SERPL-SCNC: 140 MMOL/L — SIGNIFICANT CHANGE UP (ref 135–145)
WBC # BLD: 4.27 K/UL — SIGNIFICANT CHANGE UP (ref 3.8–10.5)
WBC # FLD AUTO: 4.27 K/UL — SIGNIFICANT CHANGE UP (ref 3.8–10.5)

## 2024-06-02 PROCEDURE — 99232 SBSQ HOSP IP/OBS MODERATE 35: CPT

## 2024-06-02 RX ORDER — POTASSIUM CHLORIDE 20 MEQ
40 PACKET (EA) ORAL EVERY 4 HOURS
Refills: 0 | Status: COMPLETED | OUTPATIENT
Start: 2024-06-02 | End: 2024-06-03

## 2024-06-02 RX ADMIN — SODIUM CHLORIDE 125 MILLILITER(S): 9 INJECTION INTRAMUSCULAR; INTRAVENOUS; SUBCUTANEOUS at 16:40

## 2024-06-02 RX ADMIN — INSULIN GLARGINE 5 UNIT(S): 100 INJECTION, SOLUTION SUBCUTANEOUS at 21:50

## 2024-06-02 RX ADMIN — LOSARTAN POTASSIUM 25 MILLIGRAM(S): 100 TABLET, FILM COATED ORAL at 09:31

## 2024-06-02 RX ADMIN — HYDROMORPHONE HYDROCHLORIDE 4 MILLIGRAM(S): 2 INJECTION INTRAMUSCULAR; INTRAVENOUS; SUBCUTANEOUS at 22:08

## 2024-06-02 RX ADMIN — HYDROMORPHONE HYDROCHLORIDE 4 MILLIGRAM(S): 2 INJECTION INTRAMUSCULAR; INTRAVENOUS; SUBCUTANEOUS at 21:38

## 2024-06-02 RX ADMIN — HYDROMORPHONE HYDROCHLORIDE 4 MILLIGRAM(S): 2 INJECTION INTRAMUSCULAR; INTRAVENOUS; SUBCUTANEOUS at 16:48

## 2024-06-02 RX ADMIN — HYDROMORPHONE HYDROCHLORIDE 4 MILLIGRAM(S): 2 INJECTION INTRAMUSCULAR; INTRAVENOUS; SUBCUTANEOUS at 12:30

## 2024-06-02 RX ADMIN — HYDROMORPHONE HYDROCHLORIDE 4 MILLIGRAM(S): 2 INJECTION INTRAMUSCULAR; INTRAVENOUS; SUBCUTANEOUS at 13:00

## 2024-06-02 RX ADMIN — HYDROMORPHONE HYDROCHLORIDE 4 MILLIGRAM(S): 2 INJECTION INTRAMUSCULAR; INTRAVENOUS; SUBCUTANEOUS at 08:45

## 2024-06-02 RX ADMIN — PANTOPRAZOLE SODIUM 40 MILLIGRAM(S): 20 TABLET, DELAYED RELEASE ORAL at 21:39

## 2024-06-02 RX ADMIN — Medication 5 MILLIGRAM(S): at 21:39

## 2024-06-02 RX ADMIN — HYDROMORPHONE HYDROCHLORIDE 4 MILLIGRAM(S): 2 INJECTION INTRAMUSCULAR; INTRAVENOUS; SUBCUTANEOUS at 00:16

## 2024-06-02 RX ADMIN — POLYETHYLENE GLYCOL 3350 17 GRAM(S): 17 POWDER, FOR SOLUTION ORAL at 21:40

## 2024-06-02 RX ADMIN — HYDROMORPHONE HYDROCHLORIDE 4 MILLIGRAM(S): 2 INJECTION INTRAMUSCULAR; INTRAVENOUS; SUBCUTANEOUS at 08:15

## 2024-06-02 RX ADMIN — HYDROMORPHONE HYDROCHLORIDE 4 MILLIGRAM(S): 2 INJECTION INTRAMUSCULAR; INTRAVENOUS; SUBCUTANEOUS at 16:18

## 2024-06-02 RX ADMIN — PANTOPRAZOLE SODIUM 40 MILLIGRAM(S): 20 TABLET, DELAYED RELEASE ORAL at 09:32

## 2024-06-02 RX ADMIN — Medication 1: at 09:16

## 2024-06-02 RX ADMIN — HYDROMORPHONE HYDROCHLORIDE 4 MILLIGRAM(S): 2 INJECTION INTRAMUSCULAR; INTRAVENOUS; SUBCUTANEOUS at 04:07

## 2024-06-02 RX ADMIN — HYDROMORPHONE HYDROCHLORIDE 0.5 MILLIGRAM(S): 2 INJECTION INTRAMUSCULAR; INTRAVENOUS; SUBCUTANEOUS at 22:46

## 2024-06-02 RX ADMIN — Medication 81 MILLIGRAM(S): at 21:39

## 2024-06-02 RX ADMIN — NALOXEGOL OXALATE 12.5 MILLIGRAM(S): 12.5 TABLET, FILM COATED ORAL at 09:31

## 2024-06-02 RX ADMIN — Medication 1 GRAM(S): at 09:32

## 2024-06-02 RX ADMIN — SENNA PLUS 2 TABLET(S): 8.6 TABLET ORAL at 21:39

## 2024-06-02 RX ADMIN — SENNA PLUS 2 TABLET(S): 8.6 TABLET ORAL at 09:33

## 2024-06-02 RX ADMIN — Medication 40 MILLIEQUIVALENT(S): at 13:38

## 2024-06-02 RX ADMIN — Medication 1 GRAM(S): at 21:40

## 2024-06-02 RX ADMIN — CLOPIDOGREL BISULFATE 75 MILLIGRAM(S): 75 TABLET, FILM COATED ORAL at 09:31

## 2024-06-02 RX ADMIN — SODIUM CHLORIDE 125 MILLILITER(S): 9 INJECTION INTRAMUSCULAR; INTRAVENOUS; SUBCUTANEOUS at 12:30

## 2024-06-02 RX ADMIN — Medication 40 MILLIEQUIVALENT(S): at 21:39

## 2024-06-02 RX ADMIN — SODIUM CHLORIDE 125 MILLILITER(S): 9 INJECTION INTRAMUSCULAR; INTRAVENOUS; SUBCUTANEOUS at 04:23

## 2024-06-02 RX ADMIN — HYDROMORPHONE HYDROCHLORIDE 4 MILLIGRAM(S): 2 INJECTION INTRAMUSCULAR; INTRAVENOUS; SUBCUTANEOUS at 03:37

## 2024-06-02 RX ADMIN — POLYETHYLENE GLYCOL 3350 17 GRAM(S): 17 POWDER, FOR SOLUTION ORAL at 09:32

## 2024-06-02 NOTE — PROGRESS NOTE ADULT - ASSESSMENT
73F w/ PMH of pancreatic cancer with mets, s/p local  radiation therapy, Radiation induced Pancreatitis, , CAD, stents, DMII, HLD p/w abdominal pain found to have Pancreatitis and Ileus .    #  Acute pancreatitis and subsequent  ileus  - Etiology likely Radiation induced Pancreatitis and PD stenosis   - MRCP: PD stenosis  - will need ERCP with PD dilation Monday   - c/w IV hydration gently   - Pain control  - Celiac block possibly monday  - advance diet to regular   - PPI bid    #Constipation  - Continue movantik     # T2DM  -lantus and sliding scale    # CAD s/p PCI  - c/w aspirin   - clopidogrel     # HTN  - Stable  - Continue lisinopril     # DVT prophylaxis  - Lovenox    Plan: ERCP and Celiac nv block Monday

## 2024-06-02 NOTE — PROGRESS NOTE ADULT - SUBJECTIVE AND OBJECTIVE BOX
HOSPITALIST ATTENDING PROGRESS NOTE    Chart and meds reviewed.  Patient seen and examined.    CC: Abd pain    Subjective: Still with abd pain. Tolerating po. No fever.     All other systems reviewed and found to be negative with the exception of what has been described above.    MEDICATIONS  (STANDING):  aspirin enteric coated 81 milliGRAM(s) Oral daily  bisacodyl 5 milliGRAM(s) Oral at bedtime  clopidogrel Tablet 75 milliGRAM(s) Oral daily  dextrose 10% Bolus 125 milliLiter(s) IV Bolus once  dextrose 5%. 1000 milliLiter(s) (50 mL/Hr) IV Continuous <Continuous>  dextrose 5%. 1000 milliLiter(s) (100 mL/Hr) IV Continuous <Continuous>  dextrose 50% Injectable 25 Gram(s) IV Push once  dextrose 50% Injectable 12.5 Gram(s) IV Push once  enoxaparin Injectable 40 milliGRAM(s) SubCutaneous every 24 hours  glucagon  Injectable 1 milliGRAM(s) IntraMuscular once  insulin glargine Injectable (LANTUS) 5 Unit(s) SubCutaneous at bedtime  insulin lispro (ADMELOG) corrective regimen sliding scale   SubCutaneous three times a day before meals  losartan 25 milliGRAM(s) Oral daily  naloxegol 12.5 milliGRAM(s) Oral daily  nystatin    Suspension 459107 Unit(s) Oral four times a day  pantoprazole  Injectable 40 milliGRAM(s) IV Push two times a day  polyethylene glycol 3350 17 Gram(s) Oral two times a day  senna 2 Tablet(s) Oral two times a day  sodium chloride 0.9%. 1000 milliLiter(s) (125 mL/Hr) IV Continuous <Continuous>  sucralfate suspension 1 Gram(s) Oral two times a day    MEDICATIONS  (PRN):  acetaminophen     Tablet .. 650 milliGRAM(s) Oral every 6 hours PRN Temp greater or equal to 38C (100.4F), Mild Pain (1 - 3)  aluminum hydroxide/magnesium hydroxide/simethicone Suspension 30 milliLiter(s) Oral every 4 hours PRN Dyspepsia  bisacodyl Suppository 10 milliGRAM(s) Rectal daily PRN Constipation  dextrose Oral Gel 15 Gram(s) Oral once PRN Blood Glucose LESS THAN 70 milliGRAM(s)/deciliter  HYDROmorphone   Tablet 2 milliGRAM(s) Oral every 4 hours PRN Moderate Pain (4 - 6)  HYDROmorphone   Tablet 4 milliGRAM(s) Oral every 4 hours PRN Severe Pain (7 - 10)  HYDROmorphone  Injectable 0.5 milliGRAM(s) IV Push every 3 hours PRN breakthrough pain  melatonin 3 milliGRAM(s) Oral at bedtime PRN Insomnia  metoclopramide Injectable 10 milliGRAM(s) IV Push every 6 hours PRN pre-meals/nausea  ondansetron Injectable 4 milliGRAM(s) IV Push every 8 hours PRN Nausea and/or Vomiting  simethicone 80 milliGRAM(s) Chew three times a day PRN Gas      Vital Signs Last 24 Hrs  T(C): 36.8 (02 Jun 2024 09:27), Max: 37 (01 Jun 2024 16:12)  T(F): 98.3 (02 Jun 2024 09:27), Max: 98.6 (01 Jun 2024 16:12)  HR: 64 (02 Jun 2024 09:27) (64 - 69)  BP: 166/56 (02 Jun 2024 09:27) (165/56 - 174/58)  RR: 18 (02 Jun 2024 09:27) (18 - 18)  SpO2: 97% (02 Jun 2024 09:27) (95% - 97%)    Parameters below as of 02 Jun 2024 09:27  Patient On (Oxygen Delivery Method): room air    GEN: NAD  HEENT:  pupils equal and reactive, EOMI, no oropharyngeal lesions, erythema, exudates, oral thrush  NECK:   supple, no carotid bruits  CV:  +S1, +S2, regular, no murmurs  RESP:   lungs clear to auscultation bilaterally, no wheezing, rales, rhonchi, good air entry bilaterally  GI:  abdomen soft, non-tender, non-distended, normal BS  EXT:  no clubbing, no cyanosis, no edema, no calf pain, swelling or erythema  NEURO:  AAOX3, no focal neurological deficits, follows all commands, able to move extremities spontaneously  SKIN:  no ulcers, lesions or rashes    LABS:                          10.4   4.27  )-----------( 297      ( 02 Jun 2024 05:43 )             31.4     06-02    140  |  108  |  6<L>  ----------------------------<  140<H>  3.0<L>   |  24  |  0.50    Ca    8.8      02 Jun 2024 05:43    TPro  5.8<L>  /  Alb  2.8<L>  /  TBili  0.7  /  DBili  x   /  AST  11<L>  /  ALT  15  /  AlkPhos  66  06-02        LIVER FUNCTIONS - ( 02 Jun 2024 05:43 )  Alb: 2.8 g/dL / Pro: 5.8 gm/dL / ALK PHOS: 66 U/L / ALT: 15 U/L / AST: 11 U/L / GGT: x             Urinalysis Basic - ( 02 Jun 2024 05:43 )  Color: x / Appearance: x / SG: x / pH: x  Gluc: 140 mg/dL / Ketone: x  / Bili: x / Urobili: x   Blood: x / Protein: x / Nitrite: x   Leuk Esterase: x / RBC: x / WBC x   Sq Epi: x / Non Sq Epi: x / Bacteria: x

## 2024-06-02 NOTE — PROGRESS NOTE ADULT - SUBJECTIVE AND OBJECTIVE BOX
INTERVAL HPI/OVERNIGHT EVENTS:  Patient S&E at bedside.   last dose of iv dilaudid was 5 pm yest  taking po dilaudid this am for abd pain  planned for npo am for ercp tomorrow       PAST MEDICAL & SURGICAL HISTORY:  Coronary artery disease involving native coronary artery of native heart with unstable angina pectoris  s/p stents x 20      Type 2 diabetes mellitus without complication, without long-term current use of insulin      Hyperlipidemia, unspecified hyperlipidemia type      S/P cardiac cath      H/O lithotripsy          FAMILY HISTORY:      VITAL SIGNS:  T(F): 98.1 (06-01-24 @ 21:35)  HR: 67 (06-01-24 @ 21:35)  BP: 174/58 (06-01-24 @ 21:35)  RR: 18 (06-01-24 @ 21:35)  SpO2: 97% (06-01-24 @ 21:35)  Wt(kg): --    PHYSICAL EXAM:  Constitutional: NAD  Eyes: EOMI,   Respiratory: CTA b/l,   Cardiovascular: RRR,   Gastrointestinal: soft, mild pain and distension still on deep palpation   Neurological: AAOx3        MEDICATIONS  (STANDING):  aspirin enteric coated 81 milliGRAM(s) Oral daily  bisacodyl 5 milliGRAM(s) Oral at bedtime  clopidogrel Tablet 75 milliGRAM(s) Oral daily  dextrose 10% Bolus 125 milliLiter(s) IV Bolus once  dextrose 5%. 1000 milliLiter(s) (50 mL/Hr) IV Continuous <Continuous>  dextrose 5%. 1000 milliLiter(s) (100 mL/Hr) IV Continuous <Continuous>  dextrose 50% Injectable 25 Gram(s) IV Push once  dextrose 50% Injectable 12.5 Gram(s) IV Push once  enoxaparin Injectable 40 milliGRAM(s) SubCutaneous every 24 hours  glucagon  Injectable 1 milliGRAM(s) IntraMuscular once  insulin glargine Injectable (LANTUS) 5 Unit(s) SubCutaneous at bedtime  insulin lispro (ADMELOG) corrective regimen sliding scale   SubCutaneous three times a day before meals  losartan 25 milliGRAM(s) Oral daily  naloxegol 12.5 milliGRAM(s) Oral daily  nystatin    Suspension 605853 Unit(s) Oral four times a day  pantoprazole  Injectable 40 milliGRAM(s) IV Push two times a day  polyethylene glycol 3350 17 Gram(s) Oral two times a day  senna 2 Tablet(s) Oral two times a day  sodium chloride 0.9%. 1000 milliLiter(s) (125 mL/Hr) IV Continuous <Continuous>  sucralfate suspension 1 Gram(s) Oral two times a day    MEDICATIONS  (PRN):  acetaminophen     Tablet .. 650 milliGRAM(s) Oral every 6 hours PRN Temp greater or equal to 38C (100.4F), Mild Pain (1 - 3)  aluminum hydroxide/magnesium hydroxide/simethicone Suspension 30 milliLiter(s) Oral every 4 hours PRN Dyspepsia  bisacodyl Suppository 10 milliGRAM(s) Rectal daily PRN Constipation  dextrose Oral Gel 15 Gram(s) Oral once PRN Blood Glucose LESS THAN 70 milliGRAM(s)/deciliter  HYDROmorphone   Tablet 4 milliGRAM(s) Oral every 4 hours PRN Severe Pain (7 - 10)  HYDROmorphone   Tablet 2 milliGRAM(s) Oral every 4 hours PRN Moderate Pain (4 - 6)  HYDROmorphone  Injectable 0.5 milliGRAM(s) IV Push every 3 hours PRN breakthrough pain  melatonin 3 milliGRAM(s) Oral at bedtime PRN Insomnia  metoclopramide Injectable 10 milliGRAM(s) IV Push every 6 hours PRN pre-meals/nausea  ondansetron Injectable 4 milliGRAM(s) IV Push every 8 hours PRN Nausea and/or Vomiting  simethicone 80 milliGRAM(s) Chew three times a day PRN Gas      Allergies    IV Contrast (Short breath)    Intolerances        LABS:                        10.4   4.27  )-----------( 297      ( 02 Jun 2024 05:43 )             31.4     06-02    140  |  108  |  6<L>  ----------------------------<  140<H>  3.0<L>   |  24  |  0.50    Ca    8.8      02 Jun 2024 05:43    TPro  5.8<L>  /  Alb  2.8<L>  /  TBili  0.7  /  DBili  x   /  AST  11<L>  /  ALT  15  /  AlkPhos  66  06-02      Urinalysis Basic - ( 02 Jun 2024 05:43 )    Color: x / Appearance: x / SG: x / pH: x  Gluc: 140 mg/dL / Ketone: x  / Bili: x / Urobili: x   Blood: x / Protein: x / Nitrite: x   Leuk Esterase: x / RBC: x / WBC x   Sq Epi: x / Non Sq Epi: x / Bacteria: x        RADIOLOGY & ADDITIONAL TESTS:  Studies reviewed.

## 2024-06-03 LAB
ANION GAP SERPL CALC-SCNC: 4 MMOL/L — LOW (ref 5–17)
BUN SERPL-MCNC: 5 MG/DL — LOW (ref 7–23)
CALCIUM SERPL-MCNC: 9 MG/DL — SIGNIFICANT CHANGE UP (ref 8.5–10.1)
CHLORIDE SERPL-SCNC: 111 MMOL/L — HIGH (ref 96–108)
CO2 SERPL-SCNC: 28 MMOL/L — SIGNIFICANT CHANGE UP (ref 22–31)
CREAT SERPL-MCNC: 0.38 MG/DL — LOW (ref 0.5–1.3)
EGFR: 106 ML/MIN/1.73M2 — SIGNIFICANT CHANGE UP
GLUCOSE BLDC GLUCOMTR-MCNC: 101 MG/DL — HIGH (ref 70–99)
GLUCOSE BLDC GLUCOMTR-MCNC: 104 MG/DL — HIGH (ref 70–99)
GLUCOSE BLDC GLUCOMTR-MCNC: 108 MG/DL — HIGH (ref 70–99)
GLUCOSE BLDC GLUCOMTR-MCNC: 121 MG/DL — HIGH (ref 70–99)
GLUCOSE BLDC GLUCOMTR-MCNC: 98 MG/DL — SIGNIFICANT CHANGE UP (ref 70–99)
GLUCOSE SERPL-MCNC: 127 MG/DL — HIGH (ref 70–99)
HCT VFR BLD CALC: 31.1 % — LOW (ref 34.5–45)
HGB BLD-MCNC: 10.2 G/DL — LOW (ref 11.5–15.5)
MCHC RBC-ENTMCNC: 28.8 PG — SIGNIFICANT CHANGE UP (ref 27–34)
MCHC RBC-ENTMCNC: 32.8 GM/DL — SIGNIFICANT CHANGE UP (ref 32–36)
MCV RBC AUTO: 87.9 FL — SIGNIFICANT CHANGE UP (ref 80–100)
PLATELET # BLD AUTO: 289 K/UL — SIGNIFICANT CHANGE UP (ref 150–400)
POTASSIUM SERPL-MCNC: 3.5 MMOL/L — SIGNIFICANT CHANGE UP (ref 3.5–5.3)
POTASSIUM SERPL-SCNC: 3.5 MMOL/L — SIGNIFICANT CHANGE UP (ref 3.5–5.3)
RBC # BLD: 3.54 M/UL — LOW (ref 3.8–5.2)
RBC # FLD: 13.6 % — SIGNIFICANT CHANGE UP (ref 10.3–14.5)
SODIUM SERPL-SCNC: 143 MMOL/L — SIGNIFICANT CHANGE UP (ref 135–145)
WBC # BLD: 4.56 K/UL — SIGNIFICANT CHANGE UP (ref 3.8–10.5)
WBC # FLD AUTO: 4.56 K/UL — SIGNIFICANT CHANGE UP (ref 3.8–10.5)

## 2024-06-03 PROCEDURE — 43274 ERCP DUCT STENT PLACEMENT: CPT

## 2024-06-03 PROCEDURE — 99232 SBSQ HOSP IP/OBS MODERATE 35: CPT

## 2024-06-03 PROCEDURE — 43253 EGD US TRANSMURAL INJXN/MARK: CPT

## 2024-06-03 RX ORDER — ACETAMINOPHEN 500 MG
1000 TABLET ORAL ONCE
Refills: 0 | Status: COMPLETED | OUTPATIENT
Start: 2024-06-03 | End: 2024-06-03

## 2024-06-03 RX ORDER — FENTANYL CITRATE 50 UG/ML
25 INJECTION INTRAVENOUS
Refills: 0 | Status: DISCONTINUED | OUTPATIENT
Start: 2024-06-03 | End: 2024-06-03

## 2024-06-03 RX ORDER — ONDANSETRON 8 MG/1
4 TABLET, FILM COATED ORAL ONCE
Refills: 0 | Status: DISCONTINUED | OUTPATIENT
Start: 2024-06-03 | End: 2024-06-03

## 2024-06-03 RX ORDER — OXYCODONE HYDROCHLORIDE 5 MG/1
5 TABLET ORAL ONCE
Refills: 0 | Status: DISCONTINUED | OUTPATIENT
Start: 2024-06-03 | End: 2024-06-03

## 2024-06-03 RX ORDER — SODIUM CHLORIDE 9 MG/ML
1000 INJECTION, SOLUTION INTRAVENOUS ONCE
Refills: 0 | Status: COMPLETED | OUTPATIENT
Start: 2024-06-03 | End: 2024-06-03

## 2024-06-03 RX ORDER — SODIUM CHLORIDE 9 MG/ML
1000 INJECTION INTRAMUSCULAR; INTRAVENOUS; SUBCUTANEOUS
Refills: 0 | Status: DISCONTINUED | OUTPATIENT
Start: 2024-06-03 | End: 2024-06-03

## 2024-06-03 RX ADMIN — HYDROMORPHONE HYDROCHLORIDE 4 MILLIGRAM(S): 2 INJECTION INTRAMUSCULAR; INTRAVENOUS; SUBCUTANEOUS at 21:34

## 2024-06-03 RX ADMIN — LOSARTAN POTASSIUM 25 MILLIGRAM(S): 100 TABLET, FILM COATED ORAL at 10:15

## 2024-06-03 RX ADMIN — SODIUM CHLORIDE 125 MILLILITER(S): 9 INJECTION INTRAMUSCULAR; INTRAVENOUS; SUBCUTANEOUS at 00:37

## 2024-06-03 RX ADMIN — HYDROMORPHONE HYDROCHLORIDE 0.5 MILLIGRAM(S): 2 INJECTION INTRAMUSCULAR; INTRAVENOUS; SUBCUTANEOUS at 13:56

## 2024-06-03 RX ADMIN — OXYCODONE HYDROCHLORIDE 5 MILLIGRAM(S): 5 TABLET ORAL at 16:45

## 2024-06-03 RX ADMIN — Medication 400 MILLIGRAM(S): at 17:06

## 2024-06-03 RX ADMIN — Medication 1 GRAM(S): at 21:35

## 2024-06-03 RX ADMIN — Medication 81 MILLIGRAM(S): at 21:34

## 2024-06-03 RX ADMIN — PANTOPRAZOLE SODIUM 40 MILLIGRAM(S): 20 TABLET, DELAYED RELEASE ORAL at 10:14

## 2024-06-03 RX ADMIN — SODIUM CHLORIDE 1000 MILLILITER(S): 9 INJECTION, SOLUTION INTRAVENOUS at 17:50

## 2024-06-03 RX ADMIN — PANTOPRAZOLE SODIUM 40 MILLIGRAM(S): 20 TABLET, DELAYED RELEASE ORAL at 21:35

## 2024-06-03 RX ADMIN — Medication 40 MILLIEQUIVALENT(S): at 04:47

## 2024-06-03 RX ADMIN — HYDROMORPHONE HYDROCHLORIDE 4 MILLIGRAM(S): 2 INJECTION INTRAMUSCULAR; INTRAVENOUS; SUBCUTANEOUS at 22:34

## 2024-06-03 RX ADMIN — HYDROMORPHONE HYDROCHLORIDE 4 MILLIGRAM(S): 2 INJECTION INTRAMUSCULAR; INTRAVENOUS; SUBCUTANEOUS at 10:15

## 2024-06-03 RX ADMIN — HYDROMORPHONE HYDROCHLORIDE 4 MILLIGRAM(S): 2 INJECTION INTRAMUSCULAR; INTRAVENOUS; SUBCUTANEOUS at 06:07

## 2024-06-03 RX ADMIN — SODIUM CHLORIDE 125 MILLILITER(S): 9 INJECTION INTRAMUSCULAR; INTRAVENOUS; SUBCUTANEOUS at 08:06

## 2024-06-03 RX ADMIN — SODIUM CHLORIDE 125 MILLILITER(S): 9 INJECTION INTRAMUSCULAR; INTRAVENOUS; SUBCUTANEOUS at 18:35

## 2024-06-03 NOTE — PROGRESS NOTE ADULT - SUBJECTIVE AND OBJECTIVE BOX
INTERVAL HPI/OVERNIGHT EVENTS:  Patient S&E at bedside. No o/n events,   pt took one dose of iv dilaudid around 10 pm and slept through night   abdomen soft today   plan for ercp today and possible intervention   daughter at bedside     PAST MEDICAL & SURGICAL HISTORY:  Coronary artery disease involving native coronary artery of native heart with unstable angina pectoris  s/p stents x 20      Type 2 diabetes mellitus without complication, without long-term current use of insulin      Hyperlipidemia, unspecified hyperlipidemia type      S/P cardiac cath      H/O lithotripsy          FAMILY HISTORY:      VITAL SIGNS:  T(F): 98 (06-02-24 @ 21:41)  HR: 83 (06-02-24 @ 21:41)  BP: 168/56 (06-02-24 @ 21:41)  RR: 18 (06-02-24 @ 21:41)  SpO2: 96% (06-02-24 @ 21:41)  Wt(kg): --    PHYSICAL EXAM:  Constitutional: NAD  Eyes: EOMI,   Respiratory: CTA b/l,   Cardiovascular: RRR,   Gastrointestinal: soft, mild pain and distension still on deep palpation   Neurological: AAOx3    MEDICATIONS  (STANDING):  aspirin enteric coated 81 milliGRAM(s) Oral daily  bisacodyl 5 milliGRAM(s) Oral at bedtime  dextrose 10% Bolus 125 milliLiter(s) IV Bolus once  dextrose 5%. 1000 milliLiter(s) (100 mL/Hr) IV Continuous <Continuous>  dextrose 5%. 1000 milliLiter(s) (50 mL/Hr) IV Continuous <Continuous>  dextrose 50% Injectable 25 Gram(s) IV Push once  dextrose 50% Injectable 12.5 Gram(s) IV Push once  glucagon  Injectable 1 milliGRAM(s) IntraMuscular once  insulin glargine Injectable (LANTUS) 5 Unit(s) SubCutaneous at bedtime  insulin lispro (ADMELOG) corrective regimen sliding scale   SubCutaneous three times a day before meals  losartan 25 milliGRAM(s) Oral daily  naloxegol 12.5 milliGRAM(s) Oral daily  nystatin    Suspension 463090 Unit(s) Oral four times a day  pantoprazole  Injectable 40 milliGRAM(s) IV Push two times a day  polyethylene glycol 3350 17 Gram(s) Oral two times a day  senna 2 Tablet(s) Oral two times a day  sodium chloride 0.9%. 1000 milliLiter(s) (125 mL/Hr) IV Continuous <Continuous>  sucralfate suspension 1 Gram(s) Oral two times a day    MEDICATIONS  (PRN):  acetaminophen     Tablet .. 650 milliGRAM(s) Oral every 6 hours PRN Temp greater or equal to 38C (100.4F), Mild Pain (1 - 3)  aluminum hydroxide/magnesium hydroxide/simethicone Suspension 30 milliLiter(s) Oral every 4 hours PRN Dyspepsia  bisacodyl Suppository 10 milliGRAM(s) Rectal daily PRN Constipation  dextrose Oral Gel 15 Gram(s) Oral once PRN Blood Glucose LESS THAN 70 milliGRAM(s)/deciliter  HYDROmorphone   Tablet 4 milliGRAM(s) Oral every 4 hours PRN Severe Pain (7 - 10)  HYDROmorphone   Tablet 2 milliGRAM(s) Oral every 4 hours PRN Moderate Pain (4 - 6)  HYDROmorphone  Injectable 0.5 milliGRAM(s) IV Push every 3 hours PRN breakthrough pain  melatonin 3 milliGRAM(s) Oral at bedtime PRN Insomnia  metoclopramide Injectable 10 milliGRAM(s) IV Push every 6 hours PRN pre-meals/nausea  ondansetron Injectable 4 milliGRAM(s) IV Push every 8 hours PRN Nausea and/or Vomiting  simethicone 80 milliGRAM(s) Chew three times a day PRN Gas      Allergies    IV Contrast (Short breath)    Intolerances        LABS:                        10.2   4.56  )-----------( 289      ( 03 Jun 2024 06:00 )             31.1     06-03    143  |  111<H>  |  5<L>  ----------------------------<  127<H>  3.5   |  28  |  0.38<L>    Ca    9.0      03 Jun 2024 06:00    TPro  5.8<L>  /  Alb  2.8<L>  /  TBili  0.7  /  DBili  x   /  AST  11<L>  /  ALT  15  /  AlkPhos  66  06-02      Urinalysis Basic - ( 03 Jun 2024 06:00 )    Color: x / Appearance: x / SG: x / pH: x  Gluc: 127 mg/dL / Ketone: x  / Bili: x / Urobili: x   Blood: x / Protein: x / Nitrite: x   Leuk Esterase: x / RBC: x / WBC x   Sq Epi: x / Non Sq Epi: x / Bacteria: x        RADIOLOGY & ADDITIONAL TESTS:  Studies reviewed.

## 2024-06-03 NOTE — PROGRESS NOTE ADULT - ASSESSMENT
73F w/ PMH of pancreatic cancer local mets, on radiation therapy, from [facility], CAD, stents, DMII, HLD p/w abdominal pain and back pain.     # h/o Pancreatic adenocarcinoma   - pt not on any active treatment for h/o pancreatic ca   - discussed with daughter at bedside    # pancreatitis   - recent MRI ABDOMEN suggestive of acute interstitial edema consistent with pancreatitis NO evidence of recurrent disease NO pancreatic mass noted.   - discussed with GI and possible plan for proceeding with therapeutic celiac plexus block and ERCP today - npo  - ppi and reglan  - remains on iv dilaudid and po dilaudid for pain - last dose of iv was 10 pm last night     # r/o obstruction   - pt with significant constipation despite multiple enemas and treatment   - previous Abdominal flat plat suggestive of mild ileus - repeat AXR w/o obstruction  -  now with likely opiod induced constipation - responded to Movantik w/ bms and feeling better  - still passing gas, small bms     discussed w daughter at bedside     will follow daily and communicate with [facility]

## 2024-06-03 NOTE — PROGRESS NOTE ADULT - SUBJECTIVE AND OBJECTIVE BOX
HOSPITALIST ATTENDING PROGRESS NOTE    Chart and meds reviewed.  Patient seen and examined.    CC: Abd pain    Subjective: Still with abdominal pain. No chest pain or sob. No fever    All other systems reviewed and found to be negative with the exception of what has been described above.    MEDICATIONS  (STANDING):  aspirin enteric coated 81 milliGRAM(s) Oral daily  bisacodyl 5 milliGRAM(s) Oral at bedtime  dextrose 10% Bolus 125 milliLiter(s) IV Bolus once  dextrose 5%. 1000 milliLiter(s) (100 mL/Hr) IV Continuous <Continuous>  dextrose 5%. 1000 milliLiter(s) (50 mL/Hr) IV Continuous <Continuous>  dextrose 50% Injectable 25 Gram(s) IV Push once  dextrose 50% Injectable 12.5 Gram(s) IV Push once  glucagon  Injectable 1 milliGRAM(s) IntraMuscular once  insulin glargine Injectable (LANTUS) 5 Unit(s) SubCutaneous at bedtime  insulin lispro (ADMELOG) corrective regimen sliding scale   SubCutaneous three times a day before meals  losartan 25 milliGRAM(s) Oral daily  naloxegol 12.5 milliGRAM(s) Oral daily  nystatin    Suspension 659883 Unit(s) Oral four times a day  pantoprazole  Injectable 40 milliGRAM(s) IV Push two times a day  polyethylene glycol 3350 17 Gram(s) Oral two times a day  senna 2 Tablet(s) Oral two times a day  sodium chloride 0.9%. 1000 milliLiter(s) (125 mL/Hr) IV Continuous <Continuous>  sucralfate suspension 1 Gram(s) Oral two times a day    MEDICATIONS  (PRN):  acetaminophen     Tablet .. 650 milliGRAM(s) Oral every 6 hours PRN Temp greater or equal to 38C (100.4F), Mild Pain (1 - 3)  aluminum hydroxide/magnesium hydroxide/simethicone Suspension 30 milliLiter(s) Oral every 4 hours PRN Dyspepsia  bisacodyl Suppository 10 milliGRAM(s) Rectal daily PRN Constipation  dextrose Oral Gel 15 Gram(s) Oral once PRN Blood Glucose LESS THAN 70 milliGRAM(s)/deciliter  HYDROmorphone   Tablet 4 milliGRAM(s) Oral every 4 hours PRN Severe Pain (7 - 10)  HYDROmorphone   Tablet 2 milliGRAM(s) Oral every 4 hours PRN Moderate Pain (4 - 6)  HYDROmorphone  Injectable 0.5 milliGRAM(s) IV Push every 3 hours PRN breakthrough pain  melatonin 3 milliGRAM(s) Oral at bedtime PRN Insomnia  metoclopramide Injectable 10 milliGRAM(s) IV Push every 6 hours PRN pre-meals/nausea  ondansetron Injectable 4 milliGRAM(s) IV Push every 8 hours PRN Nausea and/or Vomiting  simethicone 80 milliGRAM(s) Chew three times a day PRN Gas    Vital Signs Last 24 Hrs  T(C): 36.7 (03 Jun 2024 09:38), Max: 36.7 (02 Jun 2024 16:15)  T(F): 98.1 (03 Jun 2024 09:38), Max: 98.1 (02 Jun 2024 16:15)  HR: 66 (03 Jun 2024 09:38) (66 - 83)  BP: 169/61 (03 Jun 2024 09:38) (154/56 - 169/61)  RR: 18 (03 Jun 2024 09:38) (18 - 18)  SpO2: 97% (03 Jun 2024 09:38) (95% - 97%)    Parameters below as of 03 Jun 2024 09:38  Patient On (Oxygen Delivery Method): room air    GEN: NAD  HEENT:  pupils equal and reactive, EOMI, no oropharyngeal lesions, erythema, exudates, oral thrush  NECK:   supple, no carotid bruits  CV:  +S1, +S2, regular, no murmurs  RESP:   lungs clear to auscultation bilaterally, no wheezing, rales, rhonchi, good air entry bilaterally  GI:  abdomen soft, non-tender, non-distended, normal BS  EXT:  no clubbing, no cyanosis, no edema, no calf pain, swelling or erythema  NEURO:  AAOX3, no focal neurological deficits, follows all commands, able to move extremities spontaneously  SKIN:  no ulcers, lesions or rashes    LABS:                          10.2   4.56  )-----------( 289      ( 03 Jun 2024 06:00 )             31.1     06-03    143  |  111<H>  |  5<L>  ----------------------------<  127<H>  3.5   |  28  |  0.38<L>    Ca    9.0      03 Jun 2024 06:00    TPro  5.8<L>  /  Alb  2.8<L>  /  TBili  0.7  /  DBili  x   /  AST  11<L>  /  ALT  15  /  AlkPhos  66  06-02        LIVER FUNCTIONS - ( 02 Jun 2024 05:43 )  Alb: 2.8 g/dL / Pro: 5.8 gm/dL / ALK PHOS: 66 U/L / ALT: 15 U/L / AST: 11 U/L / GGT: x             Urinalysis Basic - ( 03 Jun 2024 06:00 )  Color: x / Appearance: x / SG: x / pH: x  Gluc: 127 mg/dL / Ketone: x  / Bili: x / Urobili: x   Blood: x / Protein: x / Nitrite: x   Leuk Esterase: x / RBC: x / WBC x   Sq Epi: x / Non Sq Epi: x / Bacteria: x

## 2024-06-03 NOTE — PROGRESS NOTE ADULT - ASSESSMENT
73F w/ PMH of pancreatic cancer with mets, s/p local  radiation therapy, Radiation induced Pancreatitis, , CAD, stents, DMII, HLD p/w abdominal pain found to have Pancreatitis and Ileus .    #  Acute pancreatitis and subsequent  ileus  - Etiology likely Radiation induced Pancreatitis and PD stenosis   - MRCP: PD stenosis  - will need ERCP with PD dilation Monday   - c/w IV hydration gently   - Pain control  - Celiac block possibly monday  - advance diet to regular   - PPI bid    #Constipation  - Continue movantik     # T2DM  -lantus and sliding scale    # CAD s/p PCI  - c/w aspirin   - clopidogrel     # HTN  - Stable  - Continue lisinopril     # DVT prophylaxis  - Lovenox    Plan: ERCP and Celiac nv block today

## 2024-06-04 DIAGNOSIS — Z85.07 PERSONAL HISTORY OF MALIGNANT NEOPLASM OF PANCREAS: ICD-10-CM

## 2024-06-04 DIAGNOSIS — E78.5 HYPERLIPIDEMIA, UNSPECIFIED: ICD-10-CM

## 2024-06-04 DIAGNOSIS — K59.00 CONSTIPATION, UNSPECIFIED: ICD-10-CM

## 2024-06-04 DIAGNOSIS — K21.9 GASTRO-ESOPHAGEAL REFLUX DISEASE WITHOUT ESOPHAGITIS: ICD-10-CM

## 2024-06-04 DIAGNOSIS — Z79.82 LONG TERM (CURRENT) USE OF ASPIRIN: ICD-10-CM

## 2024-06-04 DIAGNOSIS — M54.50 LOW BACK PAIN, UNSPECIFIED: ICD-10-CM

## 2024-06-04 DIAGNOSIS — T66.XXXA RADIATION SICKNESS, UNSPECIFIED, INITIAL ENCOUNTER: ICD-10-CM

## 2024-06-04 DIAGNOSIS — K85.80 OTHER ACUTE PANCREATITIS WITHOUT NECROSIS OR INFECTION: ICD-10-CM

## 2024-06-04 DIAGNOSIS — E11.40 TYPE 2 DIABETES MELLITUS WITH DIABETIC NEUROPATHY, UNSPECIFIED: ICD-10-CM

## 2024-06-04 DIAGNOSIS — I25.10 ATHEROSCLEROTIC HEART DISEASE OF NATIVE CORONARY ARTERY WITHOUT ANGINA PECTORIS: ICD-10-CM

## 2024-06-04 DIAGNOSIS — Z91.041 RADIOGRAPHIC DYE ALLERGY STATUS: ICD-10-CM

## 2024-06-04 DIAGNOSIS — Z79.84 LONG TERM (CURRENT) USE OF ORAL HYPOGLYCEMIC DRUGS: ICD-10-CM

## 2024-06-04 DIAGNOSIS — Z79.02 LONG TERM (CURRENT) USE OF ANTITHROMBOTICS/ANTIPLATELETS: ICD-10-CM

## 2024-06-04 DIAGNOSIS — E43 UNSPECIFIED SEVERE PROTEIN-CALORIE MALNUTRITION: ICD-10-CM

## 2024-06-04 DIAGNOSIS — Z95.5 PRESENCE OF CORONARY ANGIOPLASTY IMPLANT AND GRAFT: ICD-10-CM

## 2024-06-04 LAB
ALBUMIN SERPL ELPH-MCNC: 2.8 G/DL — LOW (ref 3.3–5)
ALP SERPL-CCNC: 64 U/L — SIGNIFICANT CHANGE UP (ref 40–120)
ALT FLD-CCNC: 20 U/L — SIGNIFICANT CHANGE UP (ref 12–78)
ANION GAP SERPL CALC-SCNC: 6 MMOL/L — SIGNIFICANT CHANGE UP (ref 5–17)
AST SERPL-CCNC: 20 U/L — SIGNIFICANT CHANGE UP (ref 15–37)
BILIRUB SERPL-MCNC: 1 MG/DL — SIGNIFICANT CHANGE UP (ref 0.2–1.2)
BUN SERPL-MCNC: 6 MG/DL — LOW (ref 7–23)
CALCIUM SERPL-MCNC: 8.9 MG/DL — SIGNIFICANT CHANGE UP (ref 8.5–10.1)
CHLORIDE SERPL-SCNC: 111 MMOL/L — HIGH (ref 96–108)
CO2 SERPL-SCNC: 25 MMOL/L — SIGNIFICANT CHANGE UP (ref 22–31)
CREAT SERPL-MCNC: 0.46 MG/DL — LOW (ref 0.5–1.3)
EGFR: 101 ML/MIN/1.73M2 — SIGNIFICANT CHANGE UP
GLUCOSE BLDC GLUCOMTR-MCNC: 121 MG/DL — HIGH (ref 70–99)
GLUCOSE BLDC GLUCOMTR-MCNC: 130 MG/DL — HIGH (ref 70–99)
GLUCOSE BLDC GLUCOMTR-MCNC: 150 MG/DL — HIGH (ref 70–99)
GLUCOSE BLDC GLUCOMTR-MCNC: 79 MG/DL — SIGNIFICANT CHANGE UP (ref 70–99)
GLUCOSE SERPL-MCNC: 89 MG/DL — SIGNIFICANT CHANGE UP (ref 70–99)
HCT VFR BLD CALC: 32.9 % — LOW (ref 34.5–45)
HGB BLD-MCNC: 11 G/DL — LOW (ref 11.5–15.5)
MCHC RBC-ENTMCNC: 29.2 PG — SIGNIFICANT CHANGE UP (ref 27–34)
MCHC RBC-ENTMCNC: 33.4 GM/DL — SIGNIFICANT CHANGE UP (ref 32–36)
MCV RBC AUTO: 87.3 FL — SIGNIFICANT CHANGE UP (ref 80–100)
PLATELET # BLD AUTO: 313 K/UL — SIGNIFICANT CHANGE UP (ref 150–400)
POTASSIUM SERPL-MCNC: 3.5 MMOL/L — SIGNIFICANT CHANGE UP (ref 3.5–5.3)
POTASSIUM SERPL-SCNC: 3.5 MMOL/L — SIGNIFICANT CHANGE UP (ref 3.5–5.3)
PROT SERPL-MCNC: 6 GM/DL — SIGNIFICANT CHANGE UP (ref 6–8.3)
RBC # BLD: 3.77 M/UL — LOW (ref 3.8–5.2)
RBC # FLD: 13.9 % — SIGNIFICANT CHANGE UP (ref 10.3–14.5)
SODIUM SERPL-SCNC: 142 MMOL/L — SIGNIFICANT CHANGE UP (ref 135–145)
WBC # BLD: 5.55 K/UL — SIGNIFICANT CHANGE UP (ref 3.8–10.5)
WBC # FLD AUTO: 5.55 K/UL — SIGNIFICANT CHANGE UP (ref 3.8–10.5)

## 2024-06-04 PROCEDURE — 99232 SBSQ HOSP IP/OBS MODERATE 35: CPT

## 2024-06-04 PROCEDURE — 93010 ELECTROCARDIOGRAM REPORT: CPT

## 2024-06-04 RX ORDER — CLOPIDOGREL BISULFATE 75 MG/1
75 TABLET, FILM COATED ORAL DAILY
Refills: 0 | Status: DISCONTINUED | OUTPATIENT
Start: 2024-06-04 | End: 2024-06-05

## 2024-06-04 RX ORDER — MORPHINE SULFATE 50 MG/1
2 CAPSULE, EXTENDED RELEASE ORAL EVERY 6 HOURS
Refills: 0 | Status: DISCONTINUED | OUTPATIENT
Start: 2024-06-04 | End: 2024-06-04

## 2024-06-04 RX ORDER — HYDROMORPHONE HYDROCHLORIDE 2 MG/ML
2 INJECTION INTRAMUSCULAR; INTRAVENOUS; SUBCUTANEOUS EVERY 4 HOURS
Refills: 0 | Status: DISCONTINUED | OUTPATIENT
Start: 2024-06-04 | End: 2024-06-05

## 2024-06-04 RX ORDER — ENOXAPARIN SODIUM 100 MG/ML
40 INJECTION SUBCUTANEOUS EVERY 24 HOURS
Refills: 0 | Status: DISCONTINUED | OUTPATIENT
Start: 2024-06-04 | End: 2024-06-05

## 2024-06-04 RX ORDER — HYDROMORPHONE HYDROCHLORIDE 2 MG/ML
0.5 INJECTION INTRAMUSCULAR; INTRAVENOUS; SUBCUTANEOUS EVERY 6 HOURS
Refills: 0 | Status: DISCONTINUED | OUTPATIENT
Start: 2024-06-04 | End: 2024-06-05

## 2024-06-04 RX ORDER — HYDROMORPHONE HYDROCHLORIDE 2 MG/ML
4 INJECTION INTRAMUSCULAR; INTRAVENOUS; SUBCUTANEOUS EVERY 4 HOURS
Refills: 0 | Status: DISCONTINUED | OUTPATIENT
Start: 2024-06-04 | End: 2024-06-05

## 2024-06-04 RX ADMIN — PANTOPRAZOLE SODIUM 40 MILLIGRAM(S): 20 TABLET, DELAYED RELEASE ORAL at 21:13

## 2024-06-04 RX ADMIN — HYDROMORPHONE HYDROCHLORIDE 4 MILLIGRAM(S): 2 INJECTION INTRAMUSCULAR; INTRAVENOUS; SUBCUTANEOUS at 18:06

## 2024-06-04 RX ADMIN — PANTOPRAZOLE SODIUM 40 MILLIGRAM(S): 20 TABLET, DELAYED RELEASE ORAL at 09:15

## 2024-06-04 RX ADMIN — HYDROMORPHONE HYDROCHLORIDE 4 MILLIGRAM(S): 2 INJECTION INTRAMUSCULAR; INTRAVENOUS; SUBCUTANEOUS at 22:42

## 2024-06-04 RX ADMIN — Medication 650 MILLIGRAM(S): at 06:57

## 2024-06-04 RX ADMIN — HYDROMORPHONE HYDROCHLORIDE 4 MILLIGRAM(S): 2 INJECTION INTRAMUSCULAR; INTRAVENOUS; SUBCUTANEOUS at 12:40

## 2024-06-04 RX ADMIN — SODIUM CHLORIDE 125 MILLILITER(S): 9 INJECTION INTRAMUSCULAR; INTRAVENOUS; SUBCUTANEOUS at 02:42

## 2024-06-04 RX ADMIN — NALOXEGOL OXALATE 12.5 MILLIGRAM(S): 12.5 TABLET, FILM COATED ORAL at 09:15

## 2024-06-04 RX ADMIN — SENNA PLUS 2 TABLET(S): 8.6 TABLET ORAL at 09:15

## 2024-06-04 RX ADMIN — ENOXAPARIN SODIUM 40 MILLIGRAM(S): 100 INJECTION SUBCUTANEOUS at 12:40

## 2024-06-04 RX ADMIN — POLYETHYLENE GLYCOL 3350 17 GRAM(S): 17 POWDER, FOR SOLUTION ORAL at 09:16

## 2024-06-04 RX ADMIN — Medication 1 GRAM(S): at 21:14

## 2024-06-04 RX ADMIN — Medication 1 GRAM(S): at 09:15

## 2024-06-04 RX ADMIN — LOSARTAN POTASSIUM 25 MILLIGRAM(S): 100 TABLET, FILM COATED ORAL at 09:16

## 2024-06-04 RX ADMIN — SODIUM CHLORIDE 125 MILLILITER(S): 9 INJECTION INTRAMUSCULAR; INTRAVENOUS; SUBCUTANEOUS at 20:09

## 2024-06-04 RX ADMIN — HYDROMORPHONE HYDROCHLORIDE 4 MILLIGRAM(S): 2 INJECTION INTRAMUSCULAR; INTRAVENOUS; SUBCUTANEOUS at 13:40

## 2024-06-04 RX ADMIN — HYDROMORPHONE HYDROCHLORIDE 4 MILLIGRAM(S): 2 INJECTION INTRAMUSCULAR; INTRAVENOUS; SUBCUTANEOUS at 22:12

## 2024-06-04 RX ADMIN — CLOPIDOGREL BISULFATE 75 MILLIGRAM(S): 75 TABLET, FILM COATED ORAL at 12:40

## 2024-06-04 RX ADMIN — INSULIN GLARGINE 5 UNIT(S): 100 INJECTION, SOLUTION SUBCUTANEOUS at 21:13

## 2024-06-04 RX ADMIN — Medication 81 MILLIGRAM(S): at 21:13

## 2024-06-04 NOTE — PHYSICAL THERAPY INITIAL EVALUATION ADULT - GENERAL OBSERVATIONS, REHAB EVAL
IV; pt rec'd in bed supine; daughter present; Abd pain 8/10; RN Kate made aware pt rec'd in bed supine; daughter present + IV agreeable to PT. Bed mob and amb with holding IV pole 200 ft. SBA/CGA.

## 2024-06-04 NOTE — PHYSICAL THERAPY INITIAL EVALUATION ADULT - PLANNED THERAPY INTERVENTIONS, PT EVAL
stair training when appropriate/gait training/transfer training stair training when appropriate/balance training/bed mobility training/gait training/neuromuscular re-education/transfer training

## 2024-06-04 NOTE — PHYSICAL THERAPY INITIAL EVALUATION ADULT - LIGHT TOUCH SENSATION, RLE, REHAB EVAL
He has good get up and go. His mood is good overall and overall stress. He runs 3 miles most days a week and does weight resistance. He will get an eye exam with an ophthalmology visit. Age-appropriate Counseling:  Discussed preventative medicine issues with patient including regular exercise, healthy diet, stress reduction, adequate sleep and recommended age-appropriate screening studies.  Immunizations reviewed.     .   
within normal limits
within normal limits

## 2024-06-04 NOTE — PROGRESS NOTE ADULT - SUBJECTIVE AND OBJECTIVE BOX
HOSPITALIST ATTENDING PROGRESS NOTE    Chart and meds reviewed.  Patient seen and examined.    CC: abd pain    Subjective: feels better today, still sore.     All other systems reviewed and found to be negative with the exception of what has been described above.    MEDICATIONS  (STANDING):  aspirin enteric coated 81 milliGRAM(s) Oral daily  bisacodyl 5 milliGRAM(s) Oral at bedtime  clopidogrel Tablet 75 milliGRAM(s) Oral daily  dextrose 10% Bolus 125 milliLiter(s) IV Bolus once  dextrose 5%. 1000 milliLiter(s) (100 mL/Hr) IV Continuous <Continuous>  dextrose 5%. 1000 milliLiter(s) (50 mL/Hr) IV Continuous <Continuous>  dextrose 50% Injectable 25 Gram(s) IV Push once  dextrose 50% Injectable 12.5 Gram(s) IV Push once  enoxaparin Injectable 40 milliGRAM(s) SubCutaneous every 24 hours  glucagon  Injectable 1 milliGRAM(s) IntraMuscular once  insulin glargine Injectable (LANTUS) 5 Unit(s) SubCutaneous at bedtime  insulin lispro (ADMELOG) corrective regimen sliding scale   SubCutaneous three times a day before meals  losartan 25 milliGRAM(s) Oral daily  naloxegol 12.5 milliGRAM(s) Oral daily  nystatin    Suspension 570552 Unit(s) Oral four times a day  pantoprazole  Injectable 40 milliGRAM(s) IV Push two times a day  polyethylene glycol 3350 17 Gram(s) Oral two times a day  senna 2 Tablet(s) Oral two times a day  sodium chloride 0.9%. 1000 milliLiter(s) (125 mL/Hr) IV Continuous <Continuous>  sucralfate suspension 1 Gram(s) Oral two times a day    MEDICATIONS  (PRN):  acetaminophen     Tablet .. 650 milliGRAM(s) Oral every 6 hours PRN Temp greater or equal to 38C (100.4F), Mild Pain (1 - 3)  aluminum hydroxide/magnesium hydroxide/simethicone Suspension 30 milliLiter(s) Oral every 4 hours PRN Dyspepsia  bisacodyl Suppository 10 milliGRAM(s) Rectal daily PRN Constipation  dextrose Oral Gel 15 Gram(s) Oral once PRN Blood Glucose LESS THAN 70 milliGRAM(s)/deciliter  HYDROmorphone   Tablet 4 milliGRAM(s) Oral every 4 hours PRN Severe Pain (7 - 10)  melatonin 3 milliGRAM(s) Oral at bedtime PRN Insomnia  metoclopramide Injectable 10 milliGRAM(s) IV Push every 6 hours PRN pre-meals/nausea  ondansetron Injectable 4 milliGRAM(s) IV Push every 8 hours PRN Nausea and/or Vomiting  simethicone 80 milliGRAM(s) Chew three times a day PRN Gas    Vital Signs Last 24 Hrs  T(C): 36.8 (04 Jun 2024 08:41), Max: 36.9 (04 Jun 2024 05:15)  T(F): 98.2 (04 Jun 2024 08:41), Max: 98.5 (04 Jun 2024 05:15)  HR: 66 (04 Jun 2024 08:41) (62 - 104)  BP: 155/54 (04 Jun 2024 08:41) (138/56 - 183/81)  RR: 18 (04 Jun 2024 08:41) (13 - 18)  SpO2: 99% (04 Jun 2024 08:41) (94% - 100%)    Parameters below as of 04 Jun 2024 08:41  Patient On (Oxygen Delivery Method): room air    GEN: NAD  HEENT:  pupils equal and reactive, EOMI, no oropharyngeal lesions, erythema, exudates, oral thrush  NECK:   supple, no carotid bruits  CV:  +S1, +S2, regular, no murmurs  RESP:   lungs clear to auscultation bilaterally, no wheezing, rales, rhonchi, good air entry bilaterally  GI:  abdomen soft, +tender, non-distended, normal BS  EXT:  no clubbing, no cyanosis, no edema, no calf pain, swelling or erythema  NEURO:  AAOX3, no focal neurological deficits, follows all commands, able to move extremities spontaneously  SKIN:  no ulcers, lesions or rashes    LABS:                          11.0   5.55  )-----------( 313      ( 04 Jun 2024 05:30 )             32.9     06-04    142  |  111<H>  |  6<L>  ----------------------------<  89  3.5   |  25  |  0.46<L>    Ca    8.9      04 Jun 2024 05:30    TPro  6.0  /  Alb  2.8<L>  /  TBili  1.0  /  DBili  x   /  AST  20  /  ALT  20  /  AlkPhos  64  06-04        LIVER FUNCTIONS - ( 04 Jun 2024 05:30 )  Alb: 2.8 g/dL / Pro: 6.0 gm/dL / ALK PHOS: 64 U/L / ALT: 20 U/L / AST: 20 U/L / GGT: x             Urinalysis Basic - ( 04 Jun 2024 05:30 )  Color: x / Appearance: x / SG: x / pH: x  Gluc: 89 mg/dL / Ketone: x  / Bili: x / Urobili: x   Blood: x / Protein: x / Nitrite: x   Leuk Esterase: x / RBC: x / WBC x   Sq Epi: x / Non Sq Epi: x / Bacteria: x

## 2024-06-04 NOTE — PROGRESS NOTE ADULT - SUBJECTIVE AND OBJECTIVE BOX
Patient is a 73y old  Female who presents with a chief complaint of abd pain    Followup: pancreatitis 2/2 PD stricture  At bedside, patient smiling, endorsing ability to eat with minimal pain. Now on tylenol only. No opioids. Did have episode loose non bloody stool this morning.     MEDICATIONS  (STANDING):  aspirin enteric coated 81 milliGRAM(s) Oral daily  bisacodyl 5 milliGRAM(s) Oral at bedtime  clopidogrel Tablet 75 milliGRAM(s) Oral daily  dextrose 10% Bolus 125 milliLiter(s) IV Bolus once  dextrose 5%. 1000 milliLiter(s) (100 mL/Hr) IV Continuous <Continuous>  dextrose 5%. 1000 milliLiter(s) (50 mL/Hr) IV Continuous <Continuous>  dextrose 50% Injectable 25 Gram(s) IV Push once  dextrose 50% Injectable 12.5 Gram(s) IV Push once  enoxaparin Injectable 40 milliGRAM(s) SubCutaneous every 24 hours  glucagon  Injectable 1 milliGRAM(s) IntraMuscular once  insulin glargine Injectable (LANTUS) 5 Unit(s) SubCutaneous at bedtime  insulin lispro (ADMELOG) corrective regimen sliding scale   SubCutaneous three times a day before meals  losartan 25 milliGRAM(s) Oral daily  naloxegol 12.5 milliGRAM(s) Oral daily  nystatin    Suspension 248085 Unit(s) Oral four times a day  pantoprazole  Injectable 40 milliGRAM(s) IV Push two times a day  polyethylene glycol 3350 17 Gram(s) Oral two times a day  senna 2 Tablet(s) Oral two times a day  sodium chloride 0.9%. 1000 milliLiter(s) (125 mL/Hr) IV Continuous <Continuous>  sucralfate suspension 1 Gram(s) Oral two times a day    MEDICATIONS  (PRN):  acetaminophen     Tablet .. 650 milliGRAM(s) Oral every 6 hours PRN Temp greater or equal to 38C (100.4F), Mild Pain (1 - 3)  aluminum hydroxide/magnesium hydroxide/simethicone Suspension 30 milliLiter(s) Oral every 4 hours PRN Dyspepsia  bisacodyl Suppository 10 milliGRAM(s) Rectal daily PRN Constipation  dextrose Oral Gel 15 Gram(s) Oral once PRN Blood Glucose LESS THAN 70 milliGRAM(s)/deciliter  HYDROmorphone   Tablet 4 milliGRAM(s) Oral every 4 hours PRN Severe Pain (7 - 10)  melatonin 3 milliGRAM(s) Oral at bedtime PRN Insomnia  metoclopramide Injectable 10 milliGRAM(s) IV Push every 6 hours PRN pre-meals/nausea  ondansetron Injectable 4 milliGRAM(s) IV Push every 8 hours PRN Nausea and/or Vomiting  simethicone 80 milliGRAM(s) Chew three times a day PRN Gas    Vital Signs Last 24 Hrs  T(C): 36.8 (04 Jun 2024 08:41), Max: 36.9 (04 Jun 2024 05:15)  T(F): 98.2 (04 Jun 2024 08:41), Max: 98.5 (04 Jun 2024 05:15)  HR: 66 (04 Jun 2024 08:41) (62 - 104)  BP: 155/54 (04 Jun 2024 08:41) (138/56 - 183/81)  BP(mean): --  RR: 18 (04 Jun 2024 08:41) (13 - 18)  SpO2: 99% (04 Jun 2024 08:41) (94% - 100%)    Parameters below as of 04 Jun 2024 08:41  Patient On (Oxygen Delivery Method): room air    PHYSICAL EXAM:    Constitutional: No acute distress, non-toxic appearing  HEENT: masked, good phonation, not icteric  Neck: supple, no lymphadenopathy  Respiratory: clear to ascultation bilaterally, no wheezing  Cardiovascular: S1 and S2, regular rate and rhythm, no murmurs rubs or gallops  Gastrointestinal: soft, mild TTP UQ, non-distended, +bowel sounds, no rebound or guarding, no surgical scars, no drains  Extremities: No peripheral edema, no cyanosis or clubbing  Vascular: 2+ peripheral pulses, no venous stasis  Neurological: A/O x 3, no focal deficits, no asterixis  Psychiatric: Normal mood, normal affect  Skin: No rashes, not jaundiced    LABS:                        11.0   5.55  )-----------( 313      ( 04 Jun 2024 05:30 )             32.9     06-04    142  |  111<H>  |  6<L>  ----------------------------<  89  3.5   |  25  |  0.46<L>    Ca    8.9      04 Jun 2024 05:30    TPro  6.0  /  Alb  2.8<L>  /  TBili  1.0  /  DBili  x   /  AST  20  /  ALT  20  /  AlkPhos  64  06-04      LIVER FUNCTIONS - ( 04 Jun 2024 05:30 )  Alb: 2.8 g/dL / Pro: 6.0 gm/dL / ALK PHOS: 64 U/L / ALT: 20 U/L / AST: 20 U/L / GGT: x             RADIOLOGY & ADDITIONAL STUDIES:  Operative Findings  EUS with celiac neurolysis.   ERCP with stent placement through severe PD stricture   Patient is a 73y old  Female who presents with a chief complaint of abd pain    Followup: pancreatic cancer, pancreatitis 2/2 PD stricture  At bedside, patient smiling, endorsing ability to eat with minimal pain. Now on tylenol only. No opioids.     MEDICATIONS  (STANDING):  aspirin enteric coated 81 milliGRAM(s) Oral daily  bisacodyl 5 milliGRAM(s) Oral at bedtime  clopidogrel Tablet 75 milliGRAM(s) Oral daily  dextrose 10% Bolus 125 milliLiter(s) IV Bolus once  dextrose 5%. 1000 milliLiter(s) (100 mL/Hr) IV Continuous <Continuous>  dextrose 5%. 1000 milliLiter(s) (50 mL/Hr) IV Continuous <Continuous>  dextrose 50% Injectable 25 Gram(s) IV Push once  dextrose 50% Injectable 12.5 Gram(s) IV Push once  enoxaparin Injectable 40 milliGRAM(s) SubCutaneous every 24 hours  glucagon  Injectable 1 milliGRAM(s) IntraMuscular once  insulin glargine Injectable (LANTUS) 5 Unit(s) SubCutaneous at bedtime  insulin lispro (ADMELOG) corrective regimen sliding scale   SubCutaneous three times a day before meals  losartan 25 milliGRAM(s) Oral daily  naloxegol 12.5 milliGRAM(s) Oral daily  nystatin    Suspension 608323 Unit(s) Oral four times a day  pantoprazole  Injectable 40 milliGRAM(s) IV Push two times a day  polyethylene glycol 3350 17 Gram(s) Oral two times a day  senna 2 Tablet(s) Oral two times a day  sodium chloride 0.9%. 1000 milliLiter(s) (125 mL/Hr) IV Continuous <Continuous>  sucralfate suspension 1 Gram(s) Oral two times a day    MEDICATIONS  (PRN):  acetaminophen     Tablet .. 650 milliGRAM(s) Oral every 6 hours PRN Temp greater or equal to 38C (100.4F), Mild Pain (1 - 3)  aluminum hydroxide/magnesium hydroxide/simethicone Suspension 30 milliLiter(s) Oral every 4 hours PRN Dyspepsia  bisacodyl Suppository 10 milliGRAM(s) Rectal daily PRN Constipation  dextrose Oral Gel 15 Gram(s) Oral once PRN Blood Glucose LESS THAN 70 milliGRAM(s)/deciliter  HYDROmorphone   Tablet 4 milliGRAM(s) Oral every 4 hours PRN Severe Pain (7 - 10)  melatonin 3 milliGRAM(s) Oral at bedtime PRN Insomnia  metoclopramide Injectable 10 milliGRAM(s) IV Push every 6 hours PRN pre-meals/nausea  ondansetron Injectable 4 milliGRAM(s) IV Push every 8 hours PRN Nausea and/or Vomiting  simethicone 80 milliGRAM(s) Chew three times a day PRN Gas    Vital Signs Last 24 Hrs  T(C): 36.8 (04 Jun 2024 08:41), Max: 36.9 (04 Jun 2024 05:15)  T(F): 98.2 (04 Jun 2024 08:41), Max: 98.5 (04 Jun 2024 05:15)  HR: 66 (04 Jun 2024 08:41) (62 - 104)  BP: 155/54 (04 Jun 2024 08:41) (138/56 - 183/81)  BP(mean): --  RR: 18 (04 Jun 2024 08:41) (13 - 18)  SpO2: 99% (04 Jun 2024 08:41) (94% - 100%)    Parameters below as of 04 Jun 2024 08:41  Patient On (Oxygen Delivery Method): room air    PHYSICAL EXAM:    Constitutional: No acute distress, non-toxic appearing  HEENT: unmasked, good phonation, not icteric  Neck: supple, no lymphadenopathy  Respiratory: clear to ascultation bilaterally, no wheezing  Cardiovascular: S1 and S2, regular rate and rhythm, no murmurs rubs or gallops  Gastrointestinal: soft, mildly tender but much improved, non-distended, +bowel sounds, no rebound or guarding, no surgical scars, no drains  Extremities: No peripheral edema, no cyanosis or clubbing  Vascular: 2+ peripheral pulses, no venous stasis  Neurological: A/O x 3, no focal deficits, no asterixis  Psychiatric: Normal mood, normal affect  Skin: No rashes, not jaundiced    LABS:                        11.0   5.55  )-----------( 313      ( 04 Jun 2024 05:30 )             32.9     06-04    142  |  111<H>  |  6<L>  ----------------------------<  89  3.5   |  25  |  0.46<L>    Ca    8.9      04 Jun 2024 05:30    TPro  6.0  /  Alb  2.8<L>  /  TBili  1.0  /  DBili  x   /  AST  20  /  ALT  20  /  AlkPhos  64  06-04      LIVER FUNCTIONS - ( 04 Jun 2024 05:30 )  Alb: 2.8 g/dL / Pro: 6.0 gm/dL / ALK PHOS: 64 U/L / ALT: 20 U/L / AST: 20 U/L / GGT: x             RADIOLOGY & ADDITIONAL STUDIES:  Operative Findings  EUS with celiac neurolysis.   ERCP with stent placement through severe PD stricture

## 2024-06-04 NOTE — PHYSICAL THERAPY INITIAL EVALUATION ADULT - PERTINENT HX OF CURRENT PROBLEM, REHAB EVAL
73F w/ PMH of pancreatic cancer with mets, s/p local  radiation therapy, Radiation induced Pancreatitis, , CAD, stents, DMII, HLD p/w abdominal pain found to have Pancreatitis and Ileus .  Acute pancreatitis and subsequent  ileus. recent MRI ABDOMEN suggestive of acute interstitial edema consistent with pancreatitis NO evidence of recurrent disease NO pancreatic mass noted.   - 6/3 s/p ERCP with severe pancreatic duct stricture and spincterotomy was performed, a 5Fr x 9 cm stent placed in ventral pancreatic duct.
Abd pain

## 2024-06-04 NOTE — PHYSICAL THERAPY INITIAL EVALUATION ADULT - NSPTDMEREC_GEN_A_CORE
TBD Pt will requires a rolling walker at home due to theit dx of difficulty walking to help complete their MRADL's./rolling walker

## 2024-06-04 NOTE — PROGRESS NOTE ADULT - ASSESSMENT
73F w/ PMH of pancreatic cancer with mets, s/p local  radiation therapy, Radiation induced Pancreatitis, , CAD, stents, DMII, HLD p/w abdominal pain found to have Pancreatitis and Ileus .    #  Acute pancreatitis and subsequent  ileus  - Etiology likely Radiation induced Pancreatitis and PD stenosis   - MRCP: PD stenosis  - will need ERCP with PD dilation Monday   - c/w IV hydration gently   - Pain control  - Celiac block possibly monday  - advance diet to regular   - PPI bid  - SP ERCP with PD Stent & celiac block  - DC planning     #Constipation  - Continue movantik     # T2DM  -lantus and sliding scale    # CAD s/p PCI  - c/w aspirin   - clopidogrel     # HTN  - Stable  - Continue lisinopril     # DVT prophylaxis  - Lovenox

## 2024-06-04 NOTE — PROGRESS NOTE ADULT - ASSESSMENT
73 year old female with history pancreatic cancer s/p XRT 2022 hospitalized for pancreatitis, now with recurrent abdominal pain refractory to pain medication with on imaging prominent PD, edema adjacent to head/neck with normal LFTs and lipase, no leukocytosis. MRCP shows pancreatitis and stenosis of PD in neck with upstream dilation of the PD.    Imp:   Pancreatitis 2/2 pancreatic ductal stricture    ERCP 6/3 with severe stricture PD, placement stent and EUS guided celiac neurolysis with clinical improvement in pain, tolerating PO diet.    Rec:  ::Continue Movantik  ::Pain control as needed  ::Encourage PO intake  ::Watch for another day as high risk pancreatitis post-procedure

## 2024-06-04 NOTE — PROGRESS NOTE ADULT - ASSESSMENT
73F w/ PMH of pancreatic cancer local mets, on radiation therapy, from [facility], CAD, stents, DMII, HLD p/w abdominal pain and back pain.     # h/o Pancreatic adenocarcinoma   - pt not on any active treatment for h/o pancreatic ca   - discussed with daughter at bedside    # pancreatitis   - recent MRI ABDOMEN suggestive of acute interstitial edema consistent with pancreatitis NO evidence of recurrent disease NO pancreatic mass noted.   - 6/3 s/p ERCP with severe pancreatic duct stricture and spincterotomy was performed, a 5Fr x 9 cm stent placed in ventral pancreatic duct.   - Successsful EUS guided celiac neurolysis with bupivicane and alcohol   - ppi and reglan  - instructed to try to avoid further dilaudid use unless needed- on prn   - advance diet today     # r/o obstruction   - pt with significant constipation despite multiple enemas and treatment   - previous Abdominal flat plat suggestive of mild ileus - repeat AXR w/o obstruction  -  now with likely opiod induced constipation - responded to Movantik w/ bms and feeling better  - still passing gas, small bms     discussed w daughter at bedside     will follow daily and communicate with zahraa

## 2024-06-04 NOTE — PHYSICAL THERAPY INITIAL EVALUATION ADULT - CRITERIA FOR SKILLED THERAPEUTIC INTERVENTIONS
impairments found impairments found/functional limitations in following categories/risk reduction/prevention/anticipated equipment needs at discharge

## 2024-06-04 NOTE — PHYSICAL THERAPY INITIAL EVALUATION ADULT - MODALITIES TREATMENT COMMENTS
pt left in bed supine post Eval @ pt request; bed alarm on; IV intact; daughter present; callbell in reach; pt instructed not to get up alone; call nursing for assist; chantale well; pain 8/10; RN Kate aware
pt left in bed supine post Eval @ pt request; bed alarm on; IV intact; daughter present; callbell in reach; pt instructed not to get up alone; call nursing for assist; chantale well; pain 8/10; RN Kate aware

## 2024-06-04 NOTE — PROGRESS NOTE ADULT - SUBJECTIVE AND OBJECTIVE BOX
INTERVAL HPI/OVERNIGHT EVENTS:  Patient S&E at bedside.  s/p ERCP with a severe pancreatic duct stricutre and sphincterotomy performed   stent placed  celiac neurolysis as well   WBC 5.55, Hb 11, plt 313  pt feeling better this am - advancing diet    PAST MEDICAL & SURGICAL HISTORY:  Coronary artery disease involving native coronary artery of native heart with unstable angina pectoris  s/p stents x 20      Type 2 diabetes mellitus without complication, without long-term current use of insulin      Hyperlipidemia, unspecified hyperlipidemia type      S/P cardiac cath      H/O lithotripsy          FAMILY HISTORY:      VITAL SIGNS:  T(F): 98.2 (06-04-24 @ 08:41)  HR: 66 (06-04-24 @ 08:41)  BP: 155/54 (06-04-24 @ 08:41)  RR: 18 (06-04-24 @ 08:41)  SpO2: 99% (06-04-24 @ 08:41)  Wt(kg): --    PHYSICAL EXAM:    Constitutional: NAD, comfortable   Eyes: EOMI,   Respiratory: CTA b/l, good air entry b/l  Cardiovascular: RRR,   Gastrointestinal: soft, NTND  Extremities: no c/c/e  Neurological: AAOx3      MEDICATIONS  (STANDING):  aspirin enteric coated 81 milliGRAM(s) Oral daily  bisacodyl 5 milliGRAM(s) Oral at bedtime  dextrose 10% Bolus 125 milliLiter(s) IV Bolus once  dextrose 5%. 1000 milliLiter(s) (100 mL/Hr) IV Continuous <Continuous>  dextrose 5%. 1000 milliLiter(s) (50 mL/Hr) IV Continuous <Continuous>  dextrose 50% Injectable 25 Gram(s) IV Push once  dextrose 50% Injectable 12.5 Gram(s) IV Push once  glucagon  Injectable 1 milliGRAM(s) IntraMuscular once  insulin glargine Injectable (LANTUS) 5 Unit(s) SubCutaneous at bedtime  insulin lispro (ADMELOG) corrective regimen sliding scale   SubCutaneous three times a day before meals  losartan 25 milliGRAM(s) Oral daily  naloxegol 12.5 milliGRAM(s) Oral daily  nystatin    Suspension 272967 Unit(s) Oral four times a day  pantoprazole  Injectable 40 milliGRAM(s) IV Push two times a day  polyethylene glycol 3350 17 Gram(s) Oral two times a day  senna 2 Tablet(s) Oral two times a day  sodium chloride 0.9%. 1000 milliLiter(s) (125 mL/Hr) IV Continuous <Continuous>  sucralfate suspension 1 Gram(s) Oral two times a day    MEDICATIONS  (PRN):  acetaminophen     Tablet .. 650 milliGRAM(s) Oral every 6 hours PRN Temp greater or equal to 38C (100.4F), Mild Pain (1 - 3)  aluminum hydroxide/magnesium hydroxide/simethicone Suspension 30 milliLiter(s) Oral every 4 hours PRN Dyspepsia  bisacodyl Suppository 10 milliGRAM(s) Rectal daily PRN Constipation  dextrose Oral Gel 15 Gram(s) Oral once PRN Blood Glucose LESS THAN 70 milliGRAM(s)/deciliter  HYDROmorphone   Tablet 4 milliGRAM(s) Oral every 4 hours PRN Severe Pain (7 - 10)  melatonin 3 milliGRAM(s) Oral at bedtime PRN Insomnia  metoclopramide Injectable 10 milliGRAM(s) IV Push every 6 hours PRN pre-meals/nausea  ondansetron Injectable 4 milliGRAM(s) IV Push every 8 hours PRN Nausea and/or Vomiting  simethicone 80 milliGRAM(s) Chew three times a day PRN Gas      Allergies    IV Contrast (Short breath)    Intolerances        LABS:                        11.0   5.55  )-----------( 313      ( 04 Jun 2024 05:30 )             32.9     06-04    142  |  111<H>  |  6<L>  ----------------------------<  89  3.5   |  25  |  0.46<L>    Ca    8.9      04 Jun 2024 05:30    TPro  6.0  /  Alb  2.8<L>  /  TBili  1.0  /  DBili  x   /  AST  20  /  ALT  20  /  AlkPhos  64  06-04      Urinalysis Basic - ( 04 Jun 2024 05:30 )    Color: x / Appearance: x / SG: x / pH: x  Gluc: 89 mg/dL / Ketone: x  / Bili: x / Urobili: x   Blood: x / Protein: x / Nitrite: x   Leuk Esterase: x / RBC: x / WBC x   Sq Epi: x / Non Sq Epi: x / Bacteria: x        RADIOLOGY & ADDITIONAL TESTS:  Studies reviewed.

## 2024-06-05 ENCOUNTER — TRANSCRIPTION ENCOUNTER (OUTPATIENT)
Age: 74
End: 2024-06-05

## 2024-06-05 VITALS
OXYGEN SATURATION: 93 % | SYSTOLIC BLOOD PRESSURE: 155 MMHG | RESPIRATION RATE: 19 BRPM | TEMPERATURE: 97 F | DIASTOLIC BLOOD PRESSURE: 66 MMHG | HEART RATE: 86 BPM

## 2024-06-05 LAB
ALBUMIN SERPL ELPH-MCNC: 2.4 G/DL — LOW (ref 3.3–5)
ALP SERPL-CCNC: 60 U/L — SIGNIFICANT CHANGE UP (ref 40–120)
ALT FLD-CCNC: 16 U/L — SIGNIFICANT CHANGE UP (ref 12–78)
ANION GAP SERPL CALC-SCNC: 6 MMOL/L — SIGNIFICANT CHANGE UP (ref 5–17)
AST SERPL-CCNC: 8 U/L — LOW (ref 15–37)
BILIRUB SERPL-MCNC: 0.6 MG/DL — SIGNIFICANT CHANGE UP (ref 0.2–1.2)
BUN SERPL-MCNC: 7 MG/DL — SIGNIFICANT CHANGE UP (ref 7–23)
CALCIUM SERPL-MCNC: 8.3 MG/DL — LOW (ref 8.5–10.1)
CHLORIDE SERPL-SCNC: 112 MMOL/L — HIGH (ref 96–108)
CO2 SERPL-SCNC: 25 MMOL/L — SIGNIFICANT CHANGE UP (ref 22–31)
CREAT SERPL-MCNC: 0.57 MG/DL — SIGNIFICANT CHANGE UP (ref 0.5–1.3)
EGFR: 96 ML/MIN/1.73M2 — SIGNIFICANT CHANGE UP
GLUCOSE BLDC GLUCOMTR-MCNC: 130 MG/DL — HIGH (ref 70–99)
GLUCOSE BLDC GLUCOMTR-MCNC: 138 MG/DL — HIGH (ref 70–99)
GLUCOSE SERPL-MCNC: 164 MG/DL — HIGH (ref 70–99)
HCT VFR BLD CALC: 31.4 % — LOW (ref 34.5–45)
HGB BLD-MCNC: 10.5 G/DL — LOW (ref 11.5–15.5)
LIDOCAIN IGE QN: 9 U/L — LOW (ref 13–75)
MCHC RBC-ENTMCNC: 29.1 PG — SIGNIFICANT CHANGE UP (ref 27–34)
MCHC RBC-ENTMCNC: 33.4 GM/DL — SIGNIFICANT CHANGE UP (ref 32–36)
MCV RBC AUTO: 87 FL — SIGNIFICANT CHANGE UP (ref 80–100)
PLATELET # BLD AUTO: 305 K/UL — SIGNIFICANT CHANGE UP (ref 150–400)
POTASSIUM SERPL-MCNC: 2.9 MMOL/L — CRITICAL LOW (ref 3.5–5.3)
POTASSIUM SERPL-SCNC: 2.9 MMOL/L — CRITICAL LOW (ref 3.5–5.3)
PROT SERPL-MCNC: 5.5 GM/DL — LOW (ref 6–8.3)
RBC # BLD: 3.61 M/UL — LOW (ref 3.8–5.2)
RBC # FLD: 13.9 % — SIGNIFICANT CHANGE UP (ref 10.3–14.5)
SODIUM SERPL-SCNC: 143 MMOL/L — SIGNIFICANT CHANGE UP (ref 135–145)
WBC # BLD: 4.98 K/UL — SIGNIFICANT CHANGE UP (ref 3.8–10.5)
WBC # FLD AUTO: 4.98 K/UL — SIGNIFICANT CHANGE UP (ref 3.8–10.5)

## 2024-06-05 PROCEDURE — 99232 SBSQ HOSP IP/OBS MODERATE 35: CPT

## 2024-06-05 PROCEDURE — 99239 HOSP IP/OBS DSCHRG MGMT >30: CPT

## 2024-06-05 RX ORDER — ONDANSETRON 8 MG/1
1 TABLET, FILM COATED ORAL
Qty: 30 | Refills: 0
Start: 2024-06-05 | End: 2024-06-19

## 2024-06-05 RX ORDER — HYDROMORPHONE HYDROCHLORIDE 2 MG/ML
1 INJECTION INTRAMUSCULAR; INTRAVENOUS; SUBCUTANEOUS
Qty: 90 | Refills: 0
Start: 2024-06-05 | End: 2024-06-19

## 2024-06-05 RX ORDER — POTASSIUM CHLORIDE 20 MEQ
40 PACKET (EA) ORAL EVERY 4 HOURS
Refills: 0 | Status: DISCONTINUED | OUTPATIENT
Start: 2024-06-05 | End: 2024-06-05

## 2024-06-05 RX ORDER — ONDANSETRON 8 MG/1
1 TABLET, FILM COATED ORAL
Qty: 1 | Refills: 0
Start: 2024-06-05 | End: 2024-06-19

## 2024-06-05 RX ORDER — LOSARTAN POTASSIUM 100 MG/1
1 TABLET, FILM COATED ORAL
Qty: 30 | Refills: 0
Start: 2024-06-05 | End: 2024-07-04

## 2024-06-05 RX ADMIN — Medication 40 MILLIEQUIVALENT(S): at 15:14

## 2024-06-05 RX ADMIN — Medication 30 MILLILITER(S): at 03:08

## 2024-06-05 RX ADMIN — ENOXAPARIN SODIUM 40 MILLIGRAM(S): 100 INJECTION SUBCUTANEOUS at 12:09

## 2024-06-05 RX ADMIN — PANTOPRAZOLE SODIUM 40 MILLIGRAM(S): 20 TABLET, DELAYED RELEASE ORAL at 09:57

## 2024-06-05 RX ADMIN — HYDROMORPHONE HYDROCHLORIDE 4 MILLIGRAM(S): 2 INJECTION INTRAMUSCULAR; INTRAVENOUS; SUBCUTANEOUS at 09:17

## 2024-06-05 RX ADMIN — Medication 40 MILLIEQUIVALENT(S): at 09:58

## 2024-06-05 RX ADMIN — Medication 300 UNIT(S): at 15:37

## 2024-06-05 RX ADMIN — HYDROMORPHONE HYDROCHLORIDE 4 MILLIGRAM(S): 2 INJECTION INTRAMUSCULAR; INTRAVENOUS; SUBCUTANEOUS at 08:47

## 2024-06-05 RX ADMIN — LOSARTAN POTASSIUM 25 MILLIGRAM(S): 100 TABLET, FILM COATED ORAL at 09:58

## 2024-06-05 RX ADMIN — HYDROMORPHONE HYDROCHLORIDE 4 MILLIGRAM(S): 2 INJECTION INTRAMUSCULAR; INTRAVENOUS; SUBCUTANEOUS at 03:07

## 2024-06-05 RX ADMIN — HYDROMORPHONE HYDROCHLORIDE 4 MILLIGRAM(S): 2 INJECTION INTRAMUSCULAR; INTRAVENOUS; SUBCUTANEOUS at 12:50

## 2024-06-05 RX ADMIN — HYDROMORPHONE HYDROCHLORIDE 4 MILLIGRAM(S): 2 INJECTION INTRAMUSCULAR; INTRAVENOUS; SUBCUTANEOUS at 13:20

## 2024-06-05 RX ADMIN — Medication 1 GRAM(S): at 09:58

## 2024-06-05 RX ADMIN — SODIUM CHLORIDE 125 MILLILITER(S): 9 INJECTION INTRAMUSCULAR; INTRAVENOUS; SUBCUTANEOUS at 03:12

## 2024-06-05 RX ADMIN — HYDROMORPHONE HYDROCHLORIDE 0.5 MILLIGRAM(S): 2 INJECTION INTRAMUSCULAR; INTRAVENOUS; SUBCUTANEOUS at 05:33

## 2024-06-05 RX ADMIN — CLOPIDOGREL BISULFATE 75 MILLIGRAM(S): 75 TABLET, FILM COATED ORAL at 09:58

## 2024-06-05 RX ADMIN — SODIUM CHLORIDE 125 MILLILITER(S): 9 INJECTION INTRAMUSCULAR; INTRAVENOUS; SUBCUTANEOUS at 12:09

## 2024-06-05 NOTE — DISCHARGE NOTE PROVIDER - NSDCMRMEDTOKEN_GEN_ALL_CORE_FT
acetaminophen 325 mg oral tablet: 2 tab(s) orally every 6 hours As needed Temp greater or equal to 38C (100.4F), Mild Pain (1 - 3)  alirocumab 75 mg/mL subcutaneous solution: 75 milligram(s) subcutaneous 2 times a week  Aspir 81 oral delayed release tablet: 1 tab(s) orally once a day  Carafate 1 g/10 mL oral suspension: 10 milliliter(s) orally once a day  folic acid 1 mg oral tablet: 1 tab(s) orally once a day  glipiZIDE 10 mg oral tablet, extended release: 1 tab(s) orally 2 times a day  HYDROmorphone 4 mg oral tablet: 1 tab(s) orally every 4 hours as needed for Severe Pain (7 - 10) MDD: 6 tabs  Lantus Solostar Pen 100 units/mL subcutaneous solution: 10 subcutaneous once a day (at bedtime)  Lipitor 20 mg oral tablet: 1 tab(s) orally once a day  losartan 25 mg oral tablet: 1 tab(s) orally once a day  Multiple Vitamins oral tablet: 1 tab(s) orally once a day  ondansetron 4 mg oral tablet, disintegratin wafer(s) orally every 8 hours as needed for  nausea  pantoprazole 40 mg oral delayed release tablet: 1 tab(s) orally 2 times a day  Plavix 75 mg oral tablet: 1 tab(s) orally once a day  polyethylene glycol 3350 oral powder for reconstitution: 17 gram(s) orally once a day as needed for Constipation  pregabalin 100 mg oral capsule: 1 cap(s) orally 3 times a day MDD: 3 tabs  senna leaf extract oral tablet: 2 tab(s) orally once a day (at bedtime) as needed for  constipation  thiamine 100 mg oral tablet: 1 tab(s) orally once a day  Vitamin D2 2000 intl units (50 mcg) oral capsule: 1 tab(s) orally once a day

## 2024-06-05 NOTE — DISCHARGE NOTE PROVIDER - DETAILS OF MALNUTRITION DIAGNOSIS/DIAGNOSES
This patient has been assessed with a concern for Malnutrition and was treated during this hospitalization for the following Nutrition diagnosis/diagnoses:     -  05/25/2024: Severe protein-calorie malnutrition   -  05/25/2024: Underweight (BMI < 19)

## 2024-06-05 NOTE — DISCHARGE NOTE NURSING/CASE MANAGEMENT/SOCIAL WORK - PATIENT PORTAL LINK FT
You can access the FollowMyHealth Patient Portal offered by [facility] by registering at the following website: [email or web address]. By joining [facility]’s FollowMyHealth portal, you will also be able to view your health information using other applications (apps) compatible with our system.

## 2024-06-05 NOTE — PROGRESS NOTE ADULT - SUBJECTIVE AND OBJECTIVE BOX
Patient is a 73y old  Female who presents with a chief complaint of abd pain    Followup: pancreatitis, PD stricture s/p ERCP with stent  Patient with increased pain overnight. Able to tolerate PO. Reports loose nonbloody stools yesterday and overnight.  Daughter at bedside.     MEDICATIONS  (STANDING):  aspirin enteric coated 81 milliGRAM(s) Oral daily  bisacodyl 5 milliGRAM(s) Oral at bedtime  clopidogrel Tablet 75 milliGRAM(s) Oral daily  dextrose 10% Bolus 125 milliLiter(s) IV Bolus once  dextrose 5%. 1000 milliLiter(s) (100 mL/Hr) IV Continuous <Continuous>  dextrose 5%. 1000 milliLiter(s) (50 mL/Hr) IV Continuous <Continuous>  dextrose 50% Injectable 12.5 Gram(s) IV Push once  dextrose 50% Injectable 25 Gram(s) IV Push once  enoxaparin Injectable 40 milliGRAM(s) SubCutaneous every 24 hours  glucagon  Injectable 1 milliGRAM(s) IntraMuscular once  insulin glargine Injectable (LANTUS) 5 Unit(s) SubCutaneous at bedtime  insulin lispro (ADMELOG) corrective regimen sliding scale   SubCutaneous three times a day before meals  losartan 25 milliGRAM(s) Oral daily  nystatin    Suspension 376263 Unit(s) Oral four times a day  pantoprazole  Injectable 40 milliGRAM(s) IV Push two times a day  polyethylene glycol 3350 17 Gram(s) Oral two times a day  potassium chloride    Tablet ER 40 milliEquivalent(s) Oral every 4 hours  senna 2 Tablet(s) Oral two times a day  sodium chloride 0.9%. 1000 milliLiter(s) (125 mL/Hr) IV Continuous <Continuous>  sucralfate suspension 1 Gram(s) Oral two times a day    MEDICATIONS  (PRN):  acetaminophen     Tablet .. 650 milliGRAM(s) Oral every 6 hours PRN Temp greater or equal to 38C (100.4F), Mild Pain (1 - 3)  aluminum hydroxide/magnesium hydroxide/simethicone Suspension 30 milliLiter(s) Oral every 4 hours PRN Dyspepsia  bisacodyl Suppository 10 milliGRAM(s) Rectal daily PRN Constipation  dextrose Oral Gel 15 Gram(s) Oral once PRN Blood Glucose LESS THAN 70 milliGRAM(s)/deciliter  HYDROmorphone   Tablet 2 milliGRAM(s) Oral every 4 hours PRN Moderate Pain (4 - 6)  HYDROmorphone   Tablet 4 milliGRAM(s) Oral every 4 hours PRN Severe Pain (7 - 10)  HYDROmorphone  Injectable 0.5 milliGRAM(s) IV Push every 6 hours PRN breakthrough pain  melatonin 3 milliGRAM(s) Oral at bedtime PRN Insomnia  metoclopramide Injectable 10 milliGRAM(s) IV Push every 6 hours PRN pre-meals/nausea  ondansetron Injectable 4 milliGRAM(s) IV Push every 8 hours PRN Nausea and/or Vomiting  simethicone 80 milliGRAM(s) Chew three times a day PRN Gas    Vital Signs Last 24 Hrs  T(C): 36.3 (05 Jun 2024 15:29), Max: 37.2 (04 Jun 2024 20:49)  T(F): 97.3 (05 Jun 2024 15:29), Max: 98.9 (04 Jun 2024 20:49)  HR: 86 (05 Jun 2024 15:29) (73 - 86)  BP: 155/66 (05 Jun 2024 15:29) (132/48 - 155/66)  BP(mean): --  RR: 19 (05 Jun 2024 15:29) (18 - 19)  SpO2: 93% (05 Jun 2024 15:29) (93% - 94%)    Parameters below as of 05 Jun 2024 15:29  Patient On (Oxygen Delivery Method): room air        PHYSICAL EXAM:    Constitutional: No acute distress,  non-toxic appearing  HEENT: masked, good phonation, not icteric  Neck: supple, no lymphadenopathy  Respiratory: clear to ascultation bilaterally, no wheezing  Cardiovascular: S1 and S2, regular rate and rhythm, no murmurs rubs or gallops  Gastrointestinal: soft, mildTTP, non-distended, +bowel sounds, no rebound or guarding, no surgical scars, no drains  Extremities: No peripheral edema, no cyanosis or clubbing  Vascular: 2+ peripheral pulses, no venous stasis  Neurological: A/O x 3, no focal deficits, no asterixis  Psychiatric: Normal mood, normal affect  Skin: No rashes, not jaundiced    LABS:                        10.5   4.98  )-----------( 305      ( 05 Jun 2024 05:42 )             31.4     06-05    143  |  112<H>  |  7   ----------------------------<  164<H>  2.9<LL>   |  25  |  0.57    Ca    8.3<L>      05 Jun 2024 05:42    TPro  5.5<L>  /  Alb  2.4<L>  /  TBili  0.6  /  DBili  x   /  AST  8<L>  /  ALT  16  /  AlkPhos  60  06-05      LIVER FUNCTIONS - ( 05 Jun 2024 05:42 )  Alb: 2.4 g/dL / Pro: 5.5 gm/dL / ALK PHOS: 60 U/L / ALT: 16 U/L / AST: 8 U/L / GGT: x             RADIOLOGY & ADDITIONAL STUDIES: Patient is a 73y old  Female who presents with a chief complaint of abd pain    Followup: pancreatitis, PD stricture s/p ERCP with stent    Patient able to tolerate PO. Reports loose nonbloody stools yesterday and overnight.  Daughter at bedside.     MEDICATIONS  (STANDING):  aspirin enteric coated 81 milliGRAM(s) Oral daily  bisacodyl 5 milliGRAM(s) Oral at bedtime  clopidogrel Tablet 75 milliGRAM(s) Oral daily  dextrose 10% Bolus 125 milliLiter(s) IV Bolus once  dextrose 5%. 1000 milliLiter(s) (100 mL/Hr) IV Continuous <Continuous>  dextrose 5%. 1000 milliLiter(s) (50 mL/Hr) IV Continuous <Continuous>  dextrose 50% Injectable 12.5 Gram(s) IV Push once  dextrose 50% Injectable 25 Gram(s) IV Push once  enoxaparin Injectable 40 milliGRAM(s) SubCutaneous every 24 hours  glucagon  Injectable 1 milliGRAM(s) IntraMuscular once  insulin glargine Injectable (LANTUS) 5 Unit(s) SubCutaneous at bedtime  insulin lispro (ADMELOG) corrective regimen sliding scale   SubCutaneous three times a day before meals  losartan 25 milliGRAM(s) Oral daily  nystatin    Suspension 564162 Unit(s) Oral four times a day  pantoprazole  Injectable 40 milliGRAM(s) IV Push two times a day  polyethylene glycol 3350 17 Gram(s) Oral two times a day  potassium chloride    Tablet ER 40 milliEquivalent(s) Oral every 4 hours  senna 2 Tablet(s) Oral two times a day  sodium chloride 0.9%. 1000 milliLiter(s) (125 mL/Hr) IV Continuous <Continuous>  sucralfate suspension 1 Gram(s) Oral two times a day    MEDICATIONS  (PRN):  acetaminophen     Tablet .. 650 milliGRAM(s) Oral every 6 hours PRN Temp greater or equal to 38C (100.4F), Mild Pain (1 - 3)  aluminum hydroxide/magnesium hydroxide/simethicone Suspension 30 milliLiter(s) Oral every 4 hours PRN Dyspepsia  bisacodyl Suppository 10 milliGRAM(s) Rectal daily PRN Constipation  dextrose Oral Gel 15 Gram(s) Oral once PRN Blood Glucose LESS THAN 70 milliGRAM(s)/deciliter  HYDROmorphone   Tablet 2 milliGRAM(s) Oral every 4 hours PRN Moderate Pain (4 - 6)  HYDROmorphone   Tablet 4 milliGRAM(s) Oral every 4 hours PRN Severe Pain (7 - 10)  HYDROmorphone  Injectable 0.5 milliGRAM(s) IV Push every 6 hours PRN breakthrough pain  melatonin 3 milliGRAM(s) Oral at bedtime PRN Insomnia  metoclopramide Injectable 10 milliGRAM(s) IV Push every 6 hours PRN pre-meals/nausea  ondansetron Injectable 4 milliGRAM(s) IV Push every 8 hours PRN Nausea and/or Vomiting  simethicone 80 milliGRAM(s) Chew three times a day PRN Gas    Vital Signs Last 24 Hrs  T(C): 36.3 (05 Jun 2024 15:29), Max: 37.2 (04 Jun 2024 20:49)  T(F): 97.3 (05 Jun 2024 15:29), Max: 98.9 (04 Jun 2024 20:49)  HR: 86 (05 Jun 2024 15:29) (73 - 86)  BP: 155/66 (05 Jun 2024 15:29) (132/48 - 155/66)  BP(mean): --  RR: 19 (05 Jun 2024 15:29) (18 - 19)  SpO2: 93% (05 Jun 2024 15:29) (93% - 94%)    Parameters below as of 05 Jun 2024 15:29  Patient On (Oxygen Delivery Method): room air        PHYSICAL EXAM:    Constitutional: No acute distress,  non-toxic appearing  HEENT: unmasked, good phonation, not icteric  Neck: supple, no lymphadenopathy  Respiratory: clear to ascultation bilaterally, no wheezing  Cardiovascular: S1 and S2, regular rate and rhythm, no murmurs rubs or gallops  Gastrointestinal: soft, mildTTP, non-distended, +bowel sounds, no rebound or guarding  Extremities: No peripheral edema, no cyanosis or clubbing  Vascular: 2+ peripheral pulses, no venous stasis  Neurological: A/O x 3, no focal deficits, no asterixis  Psychiatric: Normal mood, normal affect  Skin: No rashes, not jaundiced    LABS:                        10.5   4.98  )-----------( 305      ( 05 Jun 2024 05:42 )             31.4     06-05    143  |  112<H>  |  7   ----------------------------<  164<H>  2.9<LL>   |  25  |  0.57    Ca    8.3<L>      05 Jun 2024 05:42    TPro  5.5<L>  /  Alb  2.4<L>  /  TBili  0.6  /  DBili  x   /  AST  8<L>  /  ALT  16  /  AlkPhos  60  06-05      LIVER FUNCTIONS - ( 05 Jun 2024 05:42 )  Alb: 2.4 g/dL / Pro: 5.5 gm/dL / ALK PHOS: 60 U/L / ALT: 16 U/L / AST: 8 U/L / GGT: x             RADIOLOGY & ADDITIONAL STUDIES:

## 2024-06-05 NOTE — DISCHARGE NOTE PROVIDER - NSDCCPCAREPLAN_GEN_ALL_CORE_FT
PRINCIPAL DISCHARGE DIAGNOSIS  Diagnosis: Pancreatitis  Assessment and Plan of Treatment: You were diagnosed with pancreatitis secondary to stricture. You had an ercp and stent placed. You are going home. Please continue medications as prescribed and FU with [facility] within 1 week. Please also fu with your PCP within 1-2 weeks for repeat BMP for Potassium. Please stay well hydrated.      SECONDARY DISCHARGE DIAGNOSES  Diagnosis: Abdominal pain  Assessment and Plan of Treatment:

## 2024-06-05 NOTE — DISCHARGE NOTE PROVIDER - NSDCFUSCHEDAPPT_GEN_ALL_CORE_FT
Fernandez Raya  [facility]  [facility] [address]  Scheduled Appointment: 06/19/2024    An Wells  [facility], [facility]  [facility] [address]  Scheduled Appointment: 06/25/2024

## 2024-06-05 NOTE — PROGRESS NOTE ADULT - SUBJECTIVE AND OBJECTIVE BOX
HOSPITALIST ATTENDING PROGRESS NOTE    Chart and meds reviewed.  Patient seen and examined.    CC: Abd pain    Subjective: Still with abdominal pain. Intermittent.     All other systems reviewed and found to be negative with the exception of what has been described above.    MEDICATIONS  (STANDING):  aspirin enteric coated 81 milliGRAM(s) Oral daily  bisacodyl 5 milliGRAM(s) Oral at bedtime  clopidogrel Tablet 75 milliGRAM(s) Oral daily  dextrose 10% Bolus 125 milliLiter(s) IV Bolus once  dextrose 5%. 1000 milliLiter(s) (100 mL/Hr) IV Continuous <Continuous>  dextrose 5%. 1000 milliLiter(s) (50 mL/Hr) IV Continuous <Continuous>  dextrose 50% Injectable 12.5 Gram(s) IV Push once  dextrose 50% Injectable 25 Gram(s) IV Push once  enoxaparin Injectable 40 milliGRAM(s) SubCutaneous every 24 hours  glucagon  Injectable 1 milliGRAM(s) IntraMuscular once  insulin glargine Injectable (LANTUS) 5 Unit(s) SubCutaneous at bedtime  insulin lispro (ADMELOG) corrective regimen sliding scale   SubCutaneous three times a day before meals  losartan 25 milliGRAM(s) Oral daily  nystatin    Suspension 671050 Unit(s) Oral four times a day  pantoprazole  Injectable 40 milliGRAM(s) IV Push two times a day  polyethylene glycol 3350 17 Gram(s) Oral two times a day  potassium chloride    Tablet ER 40 milliEquivalent(s) Oral every 4 hours  senna 2 Tablet(s) Oral two times a day  sodium chloride 0.9%. 1000 milliLiter(s) (125 mL/Hr) IV Continuous <Continuous>  sucralfate suspension 1 Gram(s) Oral two times a day    MEDICATIONS  (PRN):  acetaminophen     Tablet .. 650 milliGRAM(s) Oral every 6 hours PRN Temp greater or equal to 38C (100.4F), Mild Pain (1 - 3)  aluminum hydroxide/magnesium hydroxide/simethicone Suspension 30 milliLiter(s) Oral every 4 hours PRN Dyspepsia  bisacodyl Suppository 10 milliGRAM(s) Rectal daily PRN Constipation  dextrose Oral Gel 15 Gram(s) Oral once PRN Blood Glucose LESS THAN 70 milliGRAM(s)/deciliter  HYDROmorphone   Tablet 2 milliGRAM(s) Oral every 4 hours PRN Moderate Pain (4 - 6)  HYDROmorphone   Tablet 4 milliGRAM(s) Oral every 4 hours PRN Severe Pain (7 - 10)  HYDROmorphone  Injectable 0.5 milliGRAM(s) IV Push every 6 hours PRN breakthrough pain  melatonin 3 milliGRAM(s) Oral at bedtime PRN Insomnia  metoclopramide Injectable 10 milliGRAM(s) IV Push every 6 hours PRN pre-meals/nausea  ondansetron Injectable 4 milliGRAM(s) IV Push every 8 hours PRN Nausea and/or Vomiting  simethicone 80 milliGRAM(s) Chew three times a day PRN Gas      Vital Signs Last 24 Hrs  T(C): 36.8 (05 Jun 2024 08:15), Max: 37.2 (04 Jun 2024 20:49)  T(F): 98.3 (05 Jun 2024 08:15), Max: 98.9 (04 Jun 2024 20:49)  HR: 74 (05 Jun 2024 08:15) (66 - 74)  BP: 132/48 (05 Jun 2024 08:15) (132/48 - 151/58)  BP(mean): --  RR: 18 (05 Jun 2024 08:15) (18 - 18)  SpO2: 94% (05 Jun 2024 08:15) (94% - 94%)    Parameters below as of 05 Jun 2024 08:15  Patient On (Oxygen Delivery Method): room air    GEN: NAD  HEENT:  pupils equal and reactive, EOMI, no oropharyngeal lesions, erythema, exudates, oral thrush  NECK:   supple, no carotid bruits  CV:  +S1, +S2, regular, no murmurs  RESP:   lungs clear to auscultation bilaterally, no wheezing, rales, rhonchi, good air entry bilaterally  GI:  abdomen soft, non-tender, non-distended, normal BS  EXT:  no clubbing, no cyanosis, no edema, no calf pain, swelling or erythema  NEURO:  AAOX3, no focal neurological deficits, follows all commands, able to move extremities spontaneously  SKIN:  no ulcers, lesions or rashes    LABS:                          10.5   4.98  )-----------( 305      ( 05 Jun 2024 05:42 )             31.4     06-05    143  |  112<H>  |  7   ----------------------------<  164<H>  2.9<LL>   |  25  |  0.57    Ca    8.3<L>      05 Jun 2024 05:42    TPro  5.5<L>  /  Alb  2.4<L>  /  TBili  0.6  /  DBili  x   /  AST  8<L>  /  ALT  16  /  AlkPhos  60  06-05        LIVER FUNCTIONS - ( 05 Jun 2024 05:42 )  Alb: 2.4 g/dL / Pro: 5.5 gm/dL / ALK PHOS: 60 U/L / ALT: 16 U/L / AST: 8 U/L / GGT: x             Urinalysis Basic - ( 05 Jun 2024 05:42 )  Color: x / Appearance: x / SG: x / pH: x  Gluc: 164 mg/dL / Ketone: x  / Bili: x / Urobili: x   Blood: x / Protein: x / Nitrite: x   Leuk Esterase: x / RBC: x / WBC x   Sq Epi: x / Non Sq Epi: x / Bacteria: x

## 2024-06-05 NOTE — PROGRESS NOTE ADULT - PROVIDER SPECIALTY LIST ADULT
Gastroenterology
Gastroenterology
Heme/Onc
Hospitalist
Gastroenterology
Heme/Onc
Hospitalist
Hospitalist
Gastroenterology
Heme/Onc
Hospitalist
Gastroenterology
Heme/Onc
Hospitalist
Gastroenterology
Hospitalist
Hospitalist

## 2024-06-05 NOTE — PROGRESS NOTE ADULT - REASON FOR ADMISSION
abd pain

## 2024-06-05 NOTE — PROGRESS NOTE ADULT - NS ATTEND AMEND GEN_ALL_CORE FT
73 year old woman with pancreatic cancer s/p radiation, admitted with pancreatitis and ongong pain issues.     If still not making headway with pain (she is now eating better) will plan on EUS celiac neurolysis and pancreatic ERCP and stenting early next week.
73 year old woman with pancreaetic cancer s/p radiation, with pancreatitis likely due to stricture.     now s/p ErCp with stent through PD stricture and EUS guided neurolysis.   Patient doing much better, eating, with much improvement in  pain  if still doing ok, can be discharged later today or tomorrow
73 year old woman with pancreatic cancer s/p radiation, here with pancreatitis.     MRCP demonstrates strciture In head/neck of pancreas. Will plan on biopsy/stenting in a few weeks.   For pain, patientt very constipated. Continue aggressive bowel regimen. Miralax multiple times a day.
73 year old woman with pancreatitis and a post radiation stricture, with pancretitis, now s/p ERCP stent placement and EUS guided celiac neurolysis.     Much improvement in symptoms.   Ok for d/c home.
73 year old woman with pancreatic cancer s/p radiation, admitted with pancreatitis.     Pancreatitis likely presumed to malignant or radiation induced stricture.   Ongoing pain due to pancreatitis and/or celiac infiltration.   Plan for ERCP and stenting and celiac neurolysis early next week if paitent cant make any progress in pain/eating.  Risks of pancreatitis and utility of neurolysis explained in detail.

## 2024-06-05 NOTE — PROGRESS NOTE ADULT - SUBJECTIVE AND OBJECTIVE BOX
INTERVAL HPI/OVERNIGHT EVENTS:  Patient S&E at bedside.   Yesterday, she reports having a good day   last night, she was in more pain and required iv pain meds at 5 am   tolerating diet, walking floors, daughter at bedside  k 2.9 and repleted- likely 2/2 diarrhea - will stop movantik    PAST MEDICAL & SURGICAL HISTORY:  Coronary artery disease involving native coronary artery of native heart with unstable angina pectoris  s/p stents x 20      Type 2 diabetes mellitus without complication, without long-term current use of insulin      Hyperlipidemia, unspecified hyperlipidemia type      S/P cardiac cath      H/O lithotripsy          FAMILY HISTORY:      VITAL SIGNS:  T(F): 98.3 (06-05-24 @ 08:15)  HR: 74 (06-05-24 @ 08:15)  BP: 132/48 (06-05-24 @ 08:15)  RR: 18 (06-05-24 @ 08:15)  SpO2: 94% (06-05-24 @ 08:15)  Wt(kg): --    PHYSICAL EXAM:    Constitutional: NAD, comfortable   Eyes: EOMI,   Respiratory: CTA b/l, good air entry b/l  Cardiovascular: RRR,   Gastrointestinal: soft, NTND  Extremities: no c/c/e  Neurological: AAOx3        MEDICATIONS  (STANDING):  aspirin enteric coated 81 milliGRAM(s) Oral daily  bisacodyl 5 milliGRAM(s) Oral at bedtime  clopidogrel Tablet 75 milliGRAM(s) Oral daily  dextrose 10% Bolus 125 milliLiter(s) IV Bolus once  dextrose 5%. 1000 milliLiter(s) (100 mL/Hr) IV Continuous <Continuous>  dextrose 5%. 1000 milliLiter(s) (50 mL/Hr) IV Continuous <Continuous>  dextrose 50% Injectable 12.5 Gram(s) IV Push once  dextrose 50% Injectable 25 Gram(s) IV Push once  enoxaparin Injectable 40 milliGRAM(s) SubCutaneous every 24 hours  glucagon  Injectable 1 milliGRAM(s) IntraMuscular once  insulin glargine Injectable (LANTUS) 5 Unit(s) SubCutaneous at bedtime  insulin lispro (ADMELOG) corrective regimen sliding scale   SubCutaneous three times a day before meals  losartan 25 milliGRAM(s) Oral daily  naloxegol 12.5 milliGRAM(s) Oral daily  nystatin    Suspension 823757 Unit(s) Oral four times a day  pantoprazole  Injectable 40 milliGRAM(s) IV Push two times a day  polyethylene glycol 3350 17 Gram(s) Oral two times a day  potassium chloride    Tablet ER 40 milliEquivalent(s) Oral every 4 hours  senna 2 Tablet(s) Oral two times a day  sodium chloride 0.9%. 1000 milliLiter(s) (125 mL/Hr) IV Continuous <Continuous>  sucralfate suspension 1 Gram(s) Oral two times a day    MEDICATIONS  (PRN):  acetaminophen     Tablet .. 650 milliGRAM(s) Oral every 6 hours PRN Temp greater or equal to 38C (100.4F), Mild Pain (1 - 3)  aluminum hydroxide/magnesium hydroxide/simethicone Suspension 30 milliLiter(s) Oral every 4 hours PRN Dyspepsia  bisacodyl Suppository 10 milliGRAM(s) Rectal daily PRN Constipation  dextrose Oral Gel 15 Gram(s) Oral once PRN Blood Glucose LESS THAN 70 milliGRAM(s)/deciliter  HYDROmorphone   Tablet 2 milliGRAM(s) Oral every 4 hours PRN Moderate Pain (4 - 6)  HYDROmorphone   Tablet 4 milliGRAM(s) Oral every 4 hours PRN Severe Pain (7 - 10)  HYDROmorphone  Injectable 0.5 milliGRAM(s) IV Push every 6 hours PRN breakthrough pain  melatonin 3 milliGRAM(s) Oral at bedtime PRN Insomnia  metoclopramide Injectable 10 milliGRAM(s) IV Push every 6 hours PRN pre-meals/nausea  ondansetron Injectable 4 milliGRAM(s) IV Push every 8 hours PRN Nausea and/or Vomiting  simethicone 80 milliGRAM(s) Chew three times a day PRN Gas      Allergies    IV Contrast (Short breath)    Intolerances        LABS:                        10.5   4.98  )-----------( 305      ( 05 Jun 2024 05:42 )             31.4     06-05    143  |  112<H>  |  7   ----------------------------<  164<H>  2.9<LL>   |  25  |  0.57    Ca    8.3<L>      05 Jun 2024 05:42    TPro  5.5<L>  /  Alb  2.4<L>  /  TBili  0.6  /  DBili  x   /  AST  8<L>  /  ALT  16  /  AlkPhos  60  06-05      Urinalysis Basic - ( 05 Jun 2024 05:42 )    Color: x / Appearance: x / SG: x / pH: x  Gluc: 164 mg/dL / Ketone: x  / Bili: x / Urobili: x   Blood: x / Protein: x / Nitrite: x   Leuk Esterase: x / RBC: x / WBC x   Sq Epi: x / Non Sq Epi: x / Bacteria: x        RADIOLOGY & ADDITIONAL TESTS:  Studies reviewed.

## 2024-06-05 NOTE — PROGRESS NOTE ADULT - ASSESSMENT
73F w/ PMH of pancreatic cancer local mets, on radiation therapy, from [facility], CAD, stents, DMII, HLD p/w abdominal pain and back pain.     # h/o Pancreatic adenocarcinoma   - pt not on any active treatment for h/o pancreatic ca   - discussed with daughter at bedside    # pancreatitis   - recent MRI ABDOMEN suggestive of acute interstitial edema consistent with pancreatitis NO evidence of recurrent disease NO pancreatic mass noted.   - 6/3 s/p ERCP with severe pancreatic duct stricture and spincterotomy was performed, a 5Fr x 9 cm stent placed in ventral pancreatic duct.   - Successsful EUS guided celiac neurolysis with bupivicane and alcohol   - ppi and reglan  - instructed to try to avoid further iv dilaudid use unless needed- on prn   - tolerating diet     # r/o obstruction   - pt with significant constipation despite multiple enemas and treatment   - previous Abdominal flat plat suggestive of mild ileus - repeat AXR w/o obstruction  - resolved opiod induced constipation - responded to Movantik w/ bms and now with diarrhea  - will dc movantik- c/w miralax prn - had 6 episodes of diarrhea, k 2.9 and repleted    discussed w daughter at bedside   discharge planning   will follow daily and communicate with [facility]

## 2024-06-05 NOTE — DISCHARGE NOTE PROVIDER - CARE PROVIDERS DIRECT ADDRESSES
[email or web address]

## 2024-06-05 NOTE — PROGRESS NOTE ADULT - NUTRITIONAL ASSESSMENT
This patient has been assessed with a concern for Malnutrition and has been determined to have a diagnosis/diagnoses of Severe protein-calorie malnutrition and Underweight (BMI < 19).    This patient is being managed with:   Diet Clear Liquid-  Entered: May 26 2024  9:54AM  
This patient has been assessed with a concern for Malnutrition and has been determined to have a diagnosis/diagnoses of Severe protein-calorie malnutrition and Underweight (BMI < 19).    This patient is being managed with:   Diet Clear Liquid-  Entered: May 26 2024  9:54AM  
This patient has been assessed with a concern for Malnutrition and has been determined to have a diagnosis/diagnoses of Severe protein-calorie malnutrition and Underweight (BMI < 19).    This patient is being managed with:   Diet Consistent Carbohydrate w/Evening Snack-  Entered: May 24 2024  1:46PM    The following pending diet order is being considered for treatment of Severe protein-calorie malnutrition and Underweight (BMI < 19):  Diet Regular-  Low Fat (LOWFAT)  Supplement Feeding Modality:  Oral  Ensure Max Cans or Servings Per Day:  2       Frequency:  Two Times a day  Entered: May 25 2024 10:05AM  
This patient has been assessed with a concern for Malnutrition and has been determined to have a diagnosis/diagnoses of Severe protein-calorie malnutrition and Underweight (BMI < 19).    This patient is being managed with:   Diet DASH/TLC-  Sodium & Cholesterol Restricted  Entered: May 29 2024 12:48PM  
This patient has been assessed with a concern for Malnutrition and has been determined to have a diagnosis/diagnoses of Severe protein-calorie malnutrition and Underweight (BMI < 19).    This patient is being managed with:   Diet DASH/TLC-  Sodium & Cholesterol Restricted  Entered: May 29 2024 12:48PM  
This patient has been assessed with a concern for Malnutrition and has been determined to have a diagnosis/diagnoses of Severe protein-calorie malnutrition and Underweight (BMI < 19).    This patient is being managed with:   Diet Consistent Carbohydrate w/Evening Snack-  Entered: Jun 4 2024  8:12AM  
This patient has been assessed with a concern for Malnutrition and has been determined to have a diagnosis/diagnoses of Severe protein-calorie malnutrition and Underweight (BMI < 19).    This patient is being managed with:   Diet Consistent Carbohydrate w/Evening Snack-  Entered: Jun 4 2024  8:12AM  
This patient has been assessed with a concern for Malnutrition and has been determined to have a diagnosis/diagnoses of Severe protein-calorie malnutrition and Underweight (BMI < 19).    This patient is being managed with:   Diet DASH/TLC-  Sodium & Cholesterol Restricted  Entered: May 29 2024 12:48PM  
This patient has been assessed with a concern for Malnutrition and has been determined to have a diagnosis/diagnoses of Severe protein-calorie malnutrition and Underweight (BMI < 19).    This patient is being managed with:   Diet NPO after Midnight-     NPO Start Date: 02-Jun-2024   NPO Start Time: 23:59  Entered: Jun 2 2024 10:43AM    Diet DASH/TLC-  Sodium & Cholesterol Restricted  Entered: May 29 2024 12:48PM  
This patient has been assessed with a concern for Malnutrition and has been determined to have a diagnosis/diagnoses of Severe protein-calorie malnutrition and Underweight (BMI < 19).    This patient is being managed with:   Diet Clear Liquid-  Entered: May 26 2024  9:54AM  
This patient has been assessed with a concern for Malnutrition and has been determined to have a diagnosis/diagnoses of Severe protein-calorie malnutrition and Underweight (BMI < 19).    This patient is being managed with:   Diet DASH/TLC-  Sodium & Cholesterol Restricted  Entered: May 29 2024 12:48PM  
This patient has been assessed with a concern for Malnutrition and has been determined to have a diagnosis/diagnoses of Severe protein-calorie malnutrition and Underweight (BMI < 19).    This patient is being managed with:   Diet Clear Liquid-  Entered: May 26 2024  9:54AM  
This patient has been assessed with a concern for Malnutrition and has been determined to have a diagnosis/diagnoses of Severe protein-calorie malnutrition and Underweight (BMI < 19).    This patient is being managed with:   Diet DASH/TLC-  Sodium & Cholesterol Restricted  Entered: May 29 2024 12:48PM  
Yes...

## 2024-06-05 NOTE — DISCHARGE NOTE PROVIDER - CARE PROVIDER_API CALL
[facility]  Internal Medicine  [address] [place] [id number]  Phone: [phone]  Fax: [phone]  Follow Up Time:

## 2024-06-05 NOTE — PROGRESS NOTE ADULT - ASSESSMENT
73 year old female with history pancreatic cancer s/p XRT 2022 hospitalized for pancreatitis, now with recurrent abdominal pain refractory to pain medication with on imaging prominent PD, edema adjacent to head/neck with normal LFTs and lipase, no leukocytosis. MRCP shows pancreatitis and stenosis of PD in neck with upstream dilation of the PD.    

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
## 2024-06-14 DIAGNOSIS — E43 UNSPECIFIED SEVERE PROTEIN-CALORIE MALNUTRITION: ICD-10-CM

## 2024-06-14 DIAGNOSIS — K56.7 ILEUS, UNSPECIFIED: ICD-10-CM

## 2024-06-14 DIAGNOSIS — K85.80 OTHER ACUTE PANCREATITIS WITHOUT NECROSIS OR INFECTION: ICD-10-CM

## 2024-06-14 DIAGNOSIS — Z85.07 PERSONAL HISTORY OF MALIGNANT NEOPLASM OF PANCREAS: ICD-10-CM

## 2024-06-14 DIAGNOSIS — I25.10 ATHEROSCLEROTIC HEART DISEASE OF NATIVE CORONARY ARTERY WITHOUT ANGINA PECTORIS: ICD-10-CM

## 2024-06-14 DIAGNOSIS — T40.605A ADVERSE EFFECT OF UNSPECIFIED NARCOTICS, INITIAL ENCOUNTER: ICD-10-CM

## 2024-06-14 DIAGNOSIS — E11.9 TYPE 2 DIABETES MELLITUS WITHOUT COMPLICATIONS: ICD-10-CM

## 2024-06-14 DIAGNOSIS — K59.03 DRUG INDUCED CONSTIPATION: ICD-10-CM

## 2024-06-14 DIAGNOSIS — I10 ESSENTIAL (PRIMARY) HYPERTENSION: ICD-10-CM

## 2024-06-14 DIAGNOSIS — Z95.5 PRESENCE OF CORONARY ANGIOPLASTY IMPLANT AND GRAFT: ICD-10-CM

## 2024-06-19 ENCOUNTER — APPOINTMENT (OUTPATIENT)
Dept: GASTROENTEROLOGY | Facility: CLINIC | Age: 74
End: 2024-06-19
Payer: MEDICARE

## 2024-06-19 VITALS
WEIGHT: 104 LBS | SYSTOLIC BLOOD PRESSURE: 148 MMHG | DIASTOLIC BLOOD PRESSURE: 70 MMHG | HEIGHT: 64 IN | BODY MASS INDEX: 17.75 KG/M2

## 2024-06-19 DIAGNOSIS — Z09 ENCOUNTER FOR FOLLOW-UP EXAMINATION AFTER COMPLETED TREATMENT FOR CONDITIONS OTHER THAN MALIGNANT NEOPLASM: ICD-10-CM

## 2024-06-19 DIAGNOSIS — C25.9 MALIGNANT NEOPLASM OF PANCREAS, UNSPECIFIED: ICD-10-CM

## 2024-06-19 PROCEDURE — 99214 OFFICE O/P EST MOD 30 MIN: CPT

## 2024-06-19 RX ORDER — PREGABALIN 100 MG/1
100 CAPSULE ORAL
Refills: 0 | Status: ACTIVE | COMMUNITY

## 2024-06-19 RX ORDER — LOSARTAN POTASSIUM 25 MG/1
25 TABLET, FILM COATED ORAL
Refills: 0 | Status: ACTIVE | COMMUNITY

## 2024-06-19 RX ORDER — ACETAMINOPHEN 500 MG/1
TABLET ORAL
Refills: 0 | Status: ACTIVE | COMMUNITY

## 2024-06-19 RX ORDER — PANTOPRAZOLE SODIUM 40 MG/1
40 GRANULE, DELAYED RELEASE ORAL
Refills: 0 | Status: ACTIVE | COMMUNITY

## 2024-06-19 RX ORDER — GLIPIZIDE 10 MG/1
10 TABLET ORAL
Refills: 0 | Status: ACTIVE | COMMUNITY

## 2024-06-19 RX ORDER — ONDANSETRON 4 MG/1
4 TABLET ORAL
Refills: 0 | Status: ACTIVE | COMMUNITY

## 2024-06-19 RX ORDER — INSULIN GLARGINE 100 [IU]/ML
100 INJECTION, SOLUTION SUBCUTANEOUS
Refills: 0 | Status: ACTIVE | COMMUNITY

## 2024-06-19 RX ORDER — CLOPIDOGREL 75 MG/1
75 TABLET, FILM COATED ORAL
Refills: 0 | Status: ACTIVE | COMMUNITY

## 2024-06-19 RX ORDER — SUCRALFATE 1 G/1
1 TABLET ORAL
Refills: 0 | Status: ACTIVE | COMMUNITY

## 2024-06-19 RX ORDER — ASPIRIN 81 MG/1
81 TABLET ORAL
Refills: 0 | Status: ACTIVE | COMMUNITY

## 2024-06-19 RX ORDER — HYDROMORPHONE HYDROCHLORIDE 4 MG/1
4 TABLET ORAL
Refills: 0 | Status: ACTIVE | COMMUNITY

## 2024-06-24 NOTE — REVIEW OF SYSTEMS
[Feeling Poorly] : feeling poorly [As Noted in HPI] : as noted in HPI [Negative] : Heme/Lymph [FreeTextEntry7] : poor appetite

## 2024-06-24 NOTE — RESULTS/DATA
[TextEntry] : reviewed EUS and ERCP in [employer] software, Rosa [facility] 3, 2024 ERCP with pancreatic sphincterotomy, placement of 5 Fr x 12 cm pd stent

## 2024-06-24 NOTE — HISTORY OF PRESENT ILLNESS
[FreeTextEntry1] : 73 year old woman with unresectable pancreatic cancer, s/p radiation therapy at [facility] x 3 years, recent hospital admission due to pancreatitis from stricture, now s/p ERCP and stent placement and EUS guided celiac neurolysis, here for ongoing issues of no appetite. '  Patient's pain since discharge has been stable/improved. D/c'd off opiates, and only takes tylenol intermittently for pain. She has a very poor appetite and is not eating. No post prandial pain, no nausea or vomiting, just has no appetite. She just doesn't want to eat.  No post prandial symptoms. No nausea or vomiting. No abdominal pain.No jaundice.  No overt signs of GI bleeding like hematemesis, melena, hematochezia, or coffee grind emesis.

## 2024-06-24 NOTE — ASSESSMENT
[FreeTextEntry1] : 73 year old woman with unresectable pancreatic cancer, s/p radiation therapy at [facility] x 3 years, recent hospital admission due to pancreatitis from stricture, now s/p ERCP and stent placement and EUS guided celiac neurolysis, here for ongoing issues of no appetite.   Will get CT scan to ensure no developing pseudocyst causing poor appetite or stent migration. They prefer to obtain imaging at [facility], will give them script. Also told them to call their oncologist about obtaining medical marijuana as an appetite stimulant.   CT with IV contrast of abd/pelvis ordered today. Will call when I get the scanned results from [facility].

## 2024-06-24 NOTE — PHYSICAL EXAM
[Alert] : alert [No Acute Distress] : no acute distress [Sclera] : the sclera and conjunctiva were normal [Hearing Threshold Finger Rub Not [place]] : hearing was normal [Normal Lips/Gums] : the lips and gums were normal [Oropharynx] : the oropharynx was normal [Normal Appearance] : the appearance of the neck was normal [No Neck Mass] : no neck mass was observed [No Respiratory Distress] : no respiratory distress [Abdomen Tenderness] : non-tender [Abdomen Soft] : soft [No Clubbing, Cyanosis] : no clubbing or cyanosis of the fingernails [Abnormal Walk] : normal gait [Normal Color / Pigmentation] : normal skin color and pigmentation [] : no rash [No Focal Deficits] : no focal deficits [Motor Exam] : the motor exam was normal [de-identified] : thin [Oriented To Time, Place, And Person] : oriented to person, place, and time

## 2024-06-25 ENCOUNTER — APPOINTMENT (OUTPATIENT)
Dept: FAMILY MEDICINE | Facility: CLINIC | Age: 74
End: 2024-06-25

## 2024-11-20 NOTE — PATIENT PROFILE ADULT - FALL HARM RISK - CONCLUSION
There is a mammogram document from [facility] on your desk for you to review and order additional imaging.  
Fall Risk

## 2025-03-05 NOTE — DISCHARGE NOTE NURSING/CASE MANAGEMENT/SOCIAL WORK - NSPROEXTENSIONSOFSELF_GEN_A_NUR
[email or web address]  URL: [email or web address]  dentures

## 2025-08-07 ENCOUNTER — OFFICE (OUTPATIENT)
Dept: URBAN - METROPOLITAN AREA CLINIC 6 | Facility: CLINIC | Age: 75
Setting detail: OPHTHALMOLOGY
End: 2025-08-07
Payer: MEDICARE

## 2025-08-07 DIAGNOSIS — H52.4: ICD-10-CM

## 2025-08-07 DIAGNOSIS — H52.7: ICD-10-CM

## 2025-08-07 DIAGNOSIS — H00.025: ICD-10-CM

## 2025-08-07 PROCEDURE — 92015 DETERMINE REFRACTIVE STATE: CPT

## 2025-08-07 PROCEDURE — 99203 OFFICE O/P NEW LOW 30 MIN: CPT

## 2025-08-07 ASSESSMENT — REFRACTION_AUTOREFRACTION
OS_AXIS: 137
OS_CYLINDER: -0.25
OD_AXIS: 076
OD_SPHERE: +0.25
OS_SPHERE: -0.50
OD_CYLINDER: -0.25

## 2025-08-07 ASSESSMENT — REFRACTION_CURRENTRX
OS_ADD: +2.25
OD_CYLINDER: +0.50
OS_AXIS: 115
OD_OVR_VA: 20/
OD_AXIS: 86
OS_SPHERE: +0.75
OS_OVR_VA: 20/
OS_VPRISM_DIRECTION: PROGS
OS_CYLINDER: +0.75
OD_ADD: +2.25
OD_VPRISM_DIRECTION: PROGS
OD_SPHERE: +0.25

## 2025-08-07 ASSESSMENT — REFRACTION_MANIFEST
OD_ADD: +2.50
OU_VA: 20/20-1
OS_AXIS: 170
OS_SPHERE: -0.25
OU_VA: 20/20-1
OD_SPHERE: +0.25
OS_ADD: +2.50
OD_SPHERE: -0.50
OD_VA1: 20/40
OS_CYLINDER: -0.50
OD_VA1: 20/40
OD_CYLINDER: SPH
OD_AXIS: 50
OS_SPHERE: PLANO
OS_CYLINDER: SPH
OD_CYLINDER: -0.50

## 2025-08-07 ASSESSMENT — KERATOMETRY
OD_AXISANGLE_DEGREES: 087
OS_K2POWER_DIOPTERS: 45.50
OD_K1POWER_DIOPTERS: 44.75
OS_K1POWER_DIOPTERS: 45.00
OD_K2POWER_DIOPTERS: 45.25
OS_AXISANGLE_DEGREES: 081
METHOD_AUTO_MANUAL: AUTO

## 2025-08-07 ASSESSMENT — PUNCTA - ASSESSMENT: OD_PUNCTA: SIL PLUG SMALL

## 2025-08-07 ASSESSMENT — LID EXAM ASSESSMENTS
OD_BLEPHARITIS: 1+
OS_BLEPHARITIS: 1+

## 2025-08-07 ASSESSMENT — TONOMETRY
OS_IOP_MMHG: 14
OD_IOP_MMHG: 13

## 2025-08-07 ASSESSMENT — CONFRONTATIONAL VISUAL FIELD TEST (CVF)
OD_FINDINGS: FULL
OS_FINDINGS: FULL

## 2025-08-07 ASSESSMENT — VISUAL ACUITY
OS_BCVA: 20/30-2
OD_BCVA: 20/40+2

## 2025-08-07 ASSESSMENT — DECREASING TEAR LAKE - SEVERITY SCORE
OD_DEC_TEARLAKE: 1+
OS_DEC_TEARLAKE: 1+

## 2025-08-21 ENCOUNTER — OFFICE (OUTPATIENT)
Dept: URBAN - METROPOLITAN AREA CLINIC 105 | Facility: CLINIC | Age: 75
Setting detail: OPHTHALMOLOGY
End: 2025-08-21
Payer: MEDICARE

## 2025-08-21 DIAGNOSIS — H00.12: ICD-10-CM

## 2025-08-21 DIAGNOSIS — H01.004: ICD-10-CM

## 2025-08-21 DIAGNOSIS — H01.001: ICD-10-CM

## 2025-08-21 PROBLEM — Z96.1 PSEUDOPHAKIA ; BOTH EYES: Status: ACTIVE | Noted: 2025-08-07

## 2025-08-21 PROBLEM — H00.15 CHALAZION; LEFT LOWER LID, RIGHT LOWER LID: Status: ACTIVE | Noted: 2025-08-21

## 2025-08-21 PROBLEM — H43.811 POSTERIOR VITREOUS DETACHMENT ; RIGHT EYE: Status: ACTIVE | Noted: 2025-08-07

## 2025-08-21 PROCEDURE — 11900 INJECT SKIN LESIONS </W 7: CPT | Mod: E2 | Performed by: STUDENT IN AN ORGANIZED HEALTH CARE EDUCATION/TRAINING PROGRAM

## 2025-08-21 PROCEDURE — 99213 OFFICE O/P EST LOW 20 MIN: CPT | Mod: 25 | Performed by: STUDENT IN AN ORGANIZED HEALTH CARE EDUCATION/TRAINING PROGRAM

## 2025-08-21 ASSESSMENT — CONFRONTATIONAL VISUAL FIELD TEST (CVF)
OD_FINDINGS: FULL
OS_FINDINGS: FULL

## 2025-08-21 ASSESSMENT — LID EXAM ASSESSMENTS
OS_BLEPHARITIS: LLL LUL 1+
OD_BLEPHARITIS: RLL RUL 1+

## 2025-08-21 ASSESSMENT — DECREASING TEAR LAKE - SEVERITY SCORE
OS_DEC_TEARLAKE: 1+
OD_DEC_TEARLAKE: 1+

## 2025-08-21 ASSESSMENT — PUNCTA - ASSESSMENT: OD_PUNCTA: SIL PLUG SMALL

## 2025-08-22 PROBLEM — H01.001 BLEPHARITIS; RIGHT UPPER LID, LEFT UPPER LID , RIGHT LOWER LID, LEFT LOWER LID: Status: ACTIVE | Noted: 2025-08-21

## 2025-08-22 PROBLEM — H01.002 BLEPHARITIS; RIGHT UPPER LID, LEFT UPPER LID , RIGHT LOWER LID, LEFT LOWER LID: Status: ACTIVE | Noted: 2025-08-21

## 2025-08-22 PROBLEM — H01.004 BLEPHARITIS; RIGHT UPPER LID, LEFT UPPER LID , RIGHT LOWER LID, LEFT LOWER LID: Status: ACTIVE | Noted: 2025-08-21

## 2025-08-22 PROBLEM — H01.005 BLEPHARITIS; RIGHT UPPER LID, LEFT UPPER LID , RIGHT LOWER LID, LEFT LOWER LID: Status: ACTIVE | Noted: 2025-08-21

## 2025-08-22 ASSESSMENT — KERATOMETRY
OS_K1POWER_DIOPTERS: 45.00
METHOD_AUTO_MANUAL: AUTO
OS_K2POWER_DIOPTERS: 45.50
OS_AXISANGLE_DEGREES: 081
OD_K2POWER_DIOPTERS: 45.25
OD_AXISANGLE_DEGREES: 087
OD_K1POWER_DIOPTERS: 44.75

## 2025-08-22 ASSESSMENT — REFRACTION_AUTOREFRACTION
OS_CYLINDER: -0.25
OS_AXIS: 137
OD_AXIS: 076
OD_CYLINDER: -0.25
OD_SPHERE: +0.25
OS_SPHERE: -0.50

## 2025-08-22 ASSESSMENT — VISUAL ACUITY
OD_BCVA: 20/40+2
OS_BCVA: 20/30-2

## 2025-09-10 ENCOUNTER — APPOINTMENT (OUTPATIENT)
Dept: INTERNAL MEDICINE | Facility: CLINIC | Age: 75
End: 2025-09-10

## 2025-09-10 VITALS
SYSTOLIC BLOOD PRESSURE: 176 MMHG | HEART RATE: 93 BPM | HEIGHT: 64 IN | BODY MASS INDEX: 17.75 KG/M2 | RESPIRATION RATE: 14 BRPM | OXYGEN SATURATION: 96 % | DIASTOLIC BLOOD PRESSURE: 76 MMHG | WEIGHT: 104 LBS

## 2025-09-10 DIAGNOSIS — F32.A DEPRESSION, UNSPECIFIED: ICD-10-CM

## 2025-09-10 DIAGNOSIS — Z92.3 PERSONAL HISTORY OF IRRADIATION: ICD-10-CM

## 2025-09-10 DIAGNOSIS — C25.9 MALIGNANT NEOPLASM OF PANCREAS, UNSPECIFIED: ICD-10-CM

## 2025-09-10 DIAGNOSIS — I25.10 ATHEROSCLEROTIC HEART DISEASE OF NATIVE CORONARY ARTERY W/OUT ANGINA PECTORIS: ICD-10-CM

## 2025-09-10 DIAGNOSIS — Z78.9 OTHER SPECIFIED HEALTH STATUS: ICD-10-CM

## 2025-09-10 DIAGNOSIS — E11.9 TYPE 2 DIABETES MELLITUS W/OUT COMPLICATIONS: ICD-10-CM

## 2025-09-10 PROCEDURE — 99214 OFFICE O/P EST MOD 30 MIN: CPT

## 2025-09-10 PROCEDURE — G2211 COMPLEX E/M VISIT ADD ON: CPT

## 2025-09-10 RX ORDER — ATORVASTATIN CALCIUM 20 MG/1
20 TABLET, FILM COATED ORAL
Refills: 0 | Status: ACTIVE | COMMUNITY

## 2025-09-10 RX ORDER — INSULIN LISPRO 100 [IU]/ML
100 INJECTION, SOLUTION INTRAVENOUS; SUBCUTANEOUS
Refills: 0 | Status: ACTIVE | COMMUNITY

## 2025-09-10 RX ORDER — PANTOPRAZOLE SODIUM 40 MG/10ML
40 INJECTION, POWDER, FOR SOLUTION INTRAVENOUS
Refills: 0 | Status: ACTIVE | COMMUNITY

## 2025-09-10 RX ORDER — CLOPIDOGREL 75 MG/1
75 TABLET, FILM COATED ORAL
Refills: 0 | Status: ACTIVE | COMMUNITY

## 2025-09-10 RX ORDER — HYDROMORPHONE HYDROCHLORIDE 2 MG/1
2 TABLET ORAL
Refills: 0 | Status: ACTIVE | COMMUNITY

## 2025-09-10 RX ORDER — INSULIN GLARGINE 100 [IU]/ML
100 INJECTION, SOLUTION SUBCUTANEOUS
Refills: 0 | Status: ACTIVE | COMMUNITY

## 2025-09-10 RX ORDER — LORAZEPAM 0.5 MG/1
0.5 TABLET ORAL
Refills: 0 | Status: ACTIVE | COMMUNITY